# Patient Record
Sex: FEMALE | Race: BLACK OR AFRICAN AMERICAN | Employment: UNEMPLOYED | URBAN - METROPOLITAN AREA
[De-identification: names, ages, dates, MRNs, and addresses within clinical notes are randomized per-mention and may not be internally consistent; named-entity substitution may affect disease eponyms.]

---

## 2017-01-01 ENCOUNTER — APPOINTMENT (OUTPATIENT)
Dept: GENERAL RADIOLOGY | Age: 82
DRG: 291 | End: 2017-01-01
Attending: PHYSICIAN ASSISTANT
Payer: MEDICARE

## 2017-01-01 ENCOUNTER — HOME CARE VISIT (OUTPATIENT)
Dept: SCHEDULING | Facility: HOME HEALTH | Age: 82
End: 2017-01-01
Payer: MEDICARE

## 2017-01-01 ENCOUNTER — APPOINTMENT (OUTPATIENT)
Dept: GENERAL RADIOLOGY | Age: 82
DRG: 291 | End: 2017-01-01
Attending: EMERGENCY MEDICINE
Payer: MEDICARE

## 2017-01-01 ENCOUNTER — HOME CARE VISIT (OUTPATIENT)
Dept: HOME HEALTH SERVICES | Facility: HOME HEALTH | Age: 82
End: 2017-01-01
Payer: MEDICARE

## 2017-01-01 ENCOUNTER — DOCUMENTATION ONLY (OUTPATIENT)
Dept: PULMONOLOGY | Age: 82
End: 2017-01-01

## 2017-01-01 ENCOUNTER — HOSPITAL ENCOUNTER (OUTPATIENT)
Dept: LAB | Age: 82
Discharge: HOME | End: 2017-01-11
Payer: MEDICARE

## 2017-01-01 ENCOUNTER — HOSPITAL ENCOUNTER (INPATIENT)
Age: 82
LOS: 4 days | Discharge: ANOTHER HOSPITAL | DRG: 291 | End: 2017-01-29
Attending: EMERGENCY MEDICINE | Admitting: INTERNAL MEDICINE
Payer: MEDICARE

## 2017-01-01 ENCOUNTER — NURSE NAVIGATOR (OUTPATIENT)
Dept: OTHER | Age: 82
End: 2017-01-01

## 2017-01-01 ENCOUNTER — PATIENT OUTREACH (OUTPATIENT)
Dept: INTERNAL MEDICINE CLINIC | Age: 82
End: 2017-01-01

## 2017-01-01 ENCOUNTER — HOME CARE VISIT (OUTPATIENT)
Dept: SCHEDULING | Facility: HOME HEALTH | Age: 82
End: 2017-01-01

## 2017-01-01 ENCOUNTER — OFFICE VISIT (OUTPATIENT)
Dept: INTERNAL MEDICINE CLINIC | Age: 82
End: 2017-01-01

## 2017-01-01 ENCOUNTER — TELEPHONE (OUTPATIENT)
Dept: INTERNAL MEDICINE CLINIC | Age: 82
End: 2017-01-01

## 2017-01-01 ENCOUNTER — OFFICE VISIT (OUTPATIENT)
Dept: PULMONOLOGY | Age: 82
End: 2017-01-01

## 2017-01-01 VITALS
DIASTOLIC BLOOD PRESSURE: 60 MMHG | TEMPERATURE: 97.4 F | RESPIRATION RATE: 18 BRPM | OXYGEN SATURATION: 88 % | WEIGHT: 156 LBS | BODY MASS INDEX: 26.63 KG/M2 | HEIGHT: 64 IN | SYSTOLIC BLOOD PRESSURE: 98 MMHG | HEART RATE: 96 BPM

## 2017-01-01 VITALS
SYSTOLIC BLOOD PRESSURE: 120 MMHG | HEART RATE: 85 BPM | TEMPERATURE: 97.1 F | DIASTOLIC BLOOD PRESSURE: 60 MMHG | OXYGEN SATURATION: 91 %

## 2017-01-01 VITALS
SYSTOLIC BLOOD PRESSURE: 114 MMHG | TEMPERATURE: 97.2 F | BODY MASS INDEX: 26.8 KG/M2 | DIASTOLIC BLOOD PRESSURE: 64 MMHG | HEIGHT: 64 IN | WEIGHT: 157 LBS | HEART RATE: 88 BPM | RESPIRATION RATE: 16 BRPM | OXYGEN SATURATION: 87 %

## 2017-01-01 VITALS
HEART RATE: 90 BPM | TEMPERATURE: 98.1 F | DIASTOLIC BLOOD PRESSURE: 60 MMHG | OXYGEN SATURATION: 83 % | SYSTOLIC BLOOD PRESSURE: 120 MMHG

## 2017-01-01 VITALS
SYSTOLIC BLOOD PRESSURE: 128 MMHG | HEART RATE: 100 BPM | HEIGHT: 64 IN | RESPIRATION RATE: 16 BRPM | DIASTOLIC BLOOD PRESSURE: 80 MMHG

## 2017-01-01 VITALS — TEMPERATURE: 97.4 F | DIASTOLIC BLOOD PRESSURE: 58 MMHG | SYSTOLIC BLOOD PRESSURE: 100 MMHG

## 2017-01-01 VITALS
OXYGEN SATURATION: 83 % | WEIGHT: 155 LBS | RESPIRATION RATE: 20 BRPM | TEMPERATURE: 97.3 F | DIASTOLIC BLOOD PRESSURE: 60 MMHG | BODY MASS INDEX: 26.46 KG/M2 | HEART RATE: 102 BPM | HEIGHT: 64 IN | SYSTOLIC BLOOD PRESSURE: 96 MMHG

## 2017-01-01 VITALS
HEART RATE: 84 BPM | DIASTOLIC BLOOD PRESSURE: 56 MMHG | TEMPERATURE: 97.5 F | OXYGEN SATURATION: 84 % | SYSTOLIC BLOOD PRESSURE: 90 MMHG

## 2017-01-01 VITALS
SYSTOLIC BLOOD PRESSURE: 110 MMHG | TEMPERATURE: 98 F | DIASTOLIC BLOOD PRESSURE: 62 MMHG | HEART RATE: 82 BPM | OXYGEN SATURATION: 89 %

## 2017-01-01 VITALS
TEMPERATURE: 99 F | HEART RATE: 71 BPM | DIASTOLIC BLOOD PRESSURE: 51 MMHG | SYSTOLIC BLOOD PRESSURE: 100 MMHG | RESPIRATION RATE: 20 BRPM

## 2017-01-01 VITALS — TEMPERATURE: 97.9 F | SYSTOLIC BLOOD PRESSURE: 100 MMHG | DIASTOLIC BLOOD PRESSURE: 66 MMHG

## 2017-01-01 VITALS
OXYGEN SATURATION: 100 % | TEMPERATURE: 97.3 F | DIASTOLIC BLOOD PRESSURE: 61 MMHG | WEIGHT: 164.7 LBS | RESPIRATION RATE: 18 BRPM | BODY MASS INDEX: 28.27 KG/M2 | SYSTOLIC BLOOD PRESSURE: 91 MMHG | HEART RATE: 98 BPM

## 2017-01-01 VITALS — DIASTOLIC BLOOD PRESSURE: 60 MMHG | SYSTOLIC BLOOD PRESSURE: 92 MMHG | HEART RATE: 80 BPM

## 2017-01-01 VITALS
TEMPERATURE: 98.4 F | RESPIRATION RATE: 20 BRPM | SYSTOLIC BLOOD PRESSURE: 104 MMHG | HEART RATE: 83 BPM | OXYGEN SATURATION: 97 % | DIASTOLIC BLOOD PRESSURE: 56 MMHG

## 2017-01-01 DIAGNOSIS — I10 ESSENTIAL HYPERTENSION, BENIGN: ICD-10-CM

## 2017-01-01 DIAGNOSIS — R09.02 HYPOXIA: Primary | ICD-10-CM

## 2017-01-01 DIAGNOSIS — M79.10 MYALGIA: ICD-10-CM

## 2017-01-01 DIAGNOSIS — M79.10 MYALGIA: Primary | ICD-10-CM

## 2017-01-01 DIAGNOSIS — J84.9 ILD (INTERSTITIAL LUNG DISEASE) (HCC): Primary | ICD-10-CM

## 2017-01-01 DIAGNOSIS — I73.00 RAYNAUD'S DISEASE WITHOUT GANGRENE: ICD-10-CM

## 2017-01-01 DIAGNOSIS — I27.20 PULMONARY HTN (HCC): ICD-10-CM

## 2017-01-01 DIAGNOSIS — R60.0 BILATERAL EDEMA OF LOWER EXTREMITY: ICD-10-CM

## 2017-01-01 DIAGNOSIS — R60.0 BILATERAL LEG EDEMA: Primary | ICD-10-CM

## 2017-01-01 DIAGNOSIS — I50.21 ACUTE SYSTOLIC CONGESTIVE HEART FAILURE (HCC): Primary | ICD-10-CM

## 2017-01-01 DIAGNOSIS — R09.02 HYPOXIA: ICD-10-CM

## 2017-01-01 DIAGNOSIS — J44.9 COPD, MODERATE (HCC): Primary | ICD-10-CM

## 2017-01-01 DIAGNOSIS — R55 SYNCOPE, UNSPECIFIED SYNCOPE TYPE: ICD-10-CM

## 2017-01-01 LAB
ACE SERPL-CCNC: 19 U/L (ref 14–82)
ALBUMIN SERPL BCP-MCNC: 2.1 G/DL (ref 3.4–5)
ALBUMIN SERPL BCP-MCNC: 2.3 G/DL (ref 3.4–5)
ALBUMIN SERPL BCP-MCNC: 2.8 G/DL (ref 3.4–5)
ALBUMIN/GLOB SERPL: 0.6 {RATIO} (ref 0.8–1.7)
ALBUMIN/GLOB SERPL: 0.7 {RATIO} (ref 0.8–1.7)
ALBUMIN/GLOB SERPL: 0.8 {RATIO} (ref 0.8–1.7)
ALP SERPL-CCNC: 39 U/L (ref 45–117)
ALP SERPL-CCNC: 52 U/L (ref 45–117)
ALP SERPL-CCNC: 54 U/L (ref 45–117)
ALT SERPL-CCNC: 23 U/L (ref 13–56)
ALT SERPL-CCNC: 27 U/L (ref 13–56)
ALT SERPL-CCNC: 28 U/L (ref 13–56)
AMORPH CRY URNS QL MICRO: ABNORMAL
ANA SER QL: POSITIVE
ANION GAP BLD CALC-SCNC: 10 MMOL/L (ref 3–18)
ANION GAP BLD CALC-SCNC: 10 MMOL/L (ref 3–18)
ANION GAP BLD CALC-SCNC: 11 MMOL/L (ref 3–18)
ANION GAP BLD CALC-SCNC: 11 MMOL/L (ref 3–18)
ANION GAP BLD CALC-SCNC: 12 MMOL/L (ref 3–18)
ANION GAP BLD CALC-SCNC: 13 MMOL/L (ref 3–18)
ANION GAP BLD CALC-SCNC: 13 MMOL/L (ref 3–18)
ANION GAP BLD CALC-SCNC: 19 MMOL/L (ref 3–18)
ANTI-CENTROMERE B, RCEBT: >8 AI (ref 0–0.9)
ANTI-RIBOSOMAL P A, RRPAT: <0.2 AI (ref 0–0.9)
ANTICHROMATIN ABS, ACHRLT: 0.7 AI (ref 0–0.9)
APPEARANCE UR: ABNORMAL
APPEARANCE UR: CLEAR
ARTERIAL PATENCY WRIST A: YES
AST SERPL W P-5'-P-CCNC: 18 U/L (ref 15–37)
AST SERPL W P-5'-P-CCNC: 19 U/L (ref 15–37)
AST SERPL W P-5'-P-CCNC: 36 U/L (ref 15–37)
ATRIAL RATE: 102 BPM
ATRIAL RATE: 92 BPM
ATRIAL RATE: 98 BPM
BACTERIA SPEC CULT: NORMAL
BACTERIA URNS QL MICRO: ABNORMAL /HPF
BASE DEFICIT BLD-SCNC: 3 MMOL/L
BASE DEFICIT BLD-SCNC: 7 MMOL/L
BASE DEFICIT BLD-SCNC: 9 MMOL/L
BASOPHILS # BLD AUTO: 0 K/UL (ref 0–0.06)
BASOPHILS # BLD AUTO: 0 K/UL (ref 0–0.1)
BASOPHILS # BLD: 0 % (ref 0–2)
BASOPHILS # BLD: 0 % (ref 0–2)
BASOPHILS # BLD: 0 % (ref 0–3)
BDY SITE: ABNORMAL
BILIRUB SERPL-MCNC: 0.5 MG/DL (ref 0.2–1)
BILIRUB SERPL-MCNC: 0.5 MG/DL (ref 0.2–1)
BILIRUB SERPL-MCNC: 1.3 MG/DL (ref 0.2–1)
BILIRUB UR QL: ABNORMAL
BILIRUB UR QL: NEGATIVE
BNP SERPL-MCNC: ABNORMAL PG/ML (ref 0–1800)
BNP SERPL-MCNC: ABNORMAL PG/ML (ref 0–1800)
BUN SERPL-MCNC: 39 MG/DL (ref 7–18)
BUN SERPL-MCNC: 39 MG/DL (ref 7–18)
BUN SERPL-MCNC: 41 MG/DL (ref 7–18)
BUN SERPL-MCNC: 42 MG/DL (ref 7–18)
BUN SERPL-MCNC: 43 MG/DL (ref 7–18)
BUN SERPL-MCNC: 47 MG/DL (ref 7–18)
BUN SERPL-MCNC: 50 MG/DL (ref 7–18)
BUN SERPL-MCNC: 51 MG/DL (ref 7–18)
BUN/CREAT SERPL: 22 (ref 12–20)
BUN/CREAT SERPL: 24 (ref 12–20)
BUN/CREAT SERPL: 24 (ref 12–20)
BUN/CREAT SERPL: 29 (ref 12–20)
BUN/CREAT SERPL: 30 (ref 12–20)
BUN/CREAT SERPL: 35 (ref 12–20)
C-ANCA TITR SER IF: NORMAL TITER
CALCIUM SERPL-MCNC: 7.7 MG/DL (ref 8.5–10.1)
CALCIUM SERPL-MCNC: 8 MG/DL (ref 8.5–10.1)
CALCIUM SERPL-MCNC: 8.1 MG/DL (ref 8.5–10.1)
CALCIUM SERPL-MCNC: 8.1 MG/DL (ref 8.5–10.1)
CALCIUM SERPL-MCNC: 8.2 MG/DL (ref 8.5–10.1)
CALCIUM SERPL-MCNC: 8.2 MG/DL (ref 8.5–10.1)
CALCIUM SERPL-MCNC: 8.3 MG/DL (ref 8.5–10.1)
CALCIUM SERPL-MCNC: 8.9 MG/DL (ref 8.5–10.1)
CALCULATED P AXIS, ECG09: 28 DEGREES
CALCULATED P AXIS, ECG09: 78 DEGREES
CALCULATED P AXIS, ECG09: 82 DEGREES
CALCULATED R AXIS, ECG10: 109 DEGREES
CALCULATED R AXIS, ECG10: 110 DEGREES
CALCULATED R AXIS, ECG10: 115 DEGREES
CALCULATED T AXIS, ECG11: 23 DEGREES
CALCULATED T AXIS, ECG11: 27 DEGREES
CALCULATED T AXIS, ECG11: 72 DEGREES
CHLORIDE SERPL-SCNC: 93 MMOL/L (ref 100–108)
CHLORIDE SERPL-SCNC: 93 MMOL/L (ref 100–108)
CHLORIDE SERPL-SCNC: 94 MMOL/L (ref 100–108)
CHLORIDE SERPL-SCNC: 95 MMOL/L (ref 100–108)
CHLORIDE SERPL-SCNC: 96 MMOL/L (ref 100–108)
CHOLEST SERPL-MCNC: 133 MG/DL
CK MB CFR SERPL CALC: 13.5 % (ref 0–4)
CK MB CFR SERPL CALC: 13.7 % (ref 0–4)
CK MB SERPL-MCNC: 2.6 NG/ML (ref 0.5–3.6)
CK MB SERPL-MCNC: 5.4 NG/ML (ref 0.5–3.6)
CK SERPL-CCNC: 19 U/L (ref 26–192)
CK SERPL-CCNC: 23 U/L (ref 26–192)
CK SERPL-CCNC: 40 U/L (ref 26–192)
CO2 SERPL-SCNC: 22 MMOL/L (ref 21–32)
CO2 SERPL-SCNC: 26 MMOL/L (ref 21–32)
CO2 SERPL-SCNC: 28 MMOL/L (ref 21–32)
CO2 SERPL-SCNC: 28 MMOL/L (ref 21–32)
CO2 SERPL-SCNC: 29 MMOL/L (ref 21–32)
CO2 SERPL-SCNC: 29 MMOL/L (ref 21–32)
COLOR UR: ABNORMAL
COLOR UR: YELLOW
CREAT SERPL-MCNC: 1.12 MG/DL (ref 0.6–1.3)
CREAT SERPL-MCNC: 1.12 MG/DL (ref 0.6–1.3)
CREAT SERPL-MCNC: 1.2 MG/DL (ref 0.6–1.3)
CREAT SERPL-MCNC: 1.35 MG/DL (ref 0.6–1.3)
CREAT SERPL-MCNC: 1.47 MG/DL (ref 0.6–1.3)
CREAT SERPL-MCNC: 1.96 MG/DL (ref 0.6–1.3)
CREAT SERPL-MCNC: 2.09 MG/DL (ref 0.6–1.3)
CREAT SERPL-MCNC: 2.23 MG/DL (ref 0.6–1.3)
CRP SERPL-MCNC: 13.3 MG/DL (ref 0–0.3)
DIAGNOSIS, 93000: NORMAL
DIFFERENTIAL METHOD BLD: ABNORMAL
DSDNA AB SER-ACNC: <1 IU/ML (ref 0–9)
ENA SCL70 AB SER-ACNC: <0.2 AI (ref 0–0.9)
ENA SM AB SER-ACNC: <0.2 AI (ref 0–0.9)
ENA SM+RNP AB SER-ACNC: <0.2 AI (ref 0–0.9)
ENA SS-A AB SER-ACNC: <0.2 AI (ref 0–0.9)
ENA SS-A AB SER-ACNC: <0.2 AI (ref 0–0.9)
ENA SS-B AB SER-ACNC: <0.2 AI (ref 0–0.9)
ENA SS-B AB SER-ACNC: <0.2 AI (ref 0–0.9)
EOSINOPHIL # BLD: 0 K/UL (ref 0–0.4)
EOSINOPHIL # BLD: 0.1 K/UL (ref 0–0.4)
EOSINOPHIL # BLD: 0.2 K/UL (ref 0–0.4)
EOSINOPHIL NFR BLD: 0 % (ref 0–5)
EOSINOPHIL NFR BLD: 1 % (ref 0–5)
EOSINOPHIL NFR BLD: 1 % (ref 0–5)
EPITH CASTS URNS QL MICRO: ABNORMAL /LPF (ref 0–5)
ERYTHROCYTE [DISTWIDTH] IN BLOOD BY AUTOMATED COUNT: 17.9 % (ref 11.6–14.5)
ERYTHROCYTE [DISTWIDTH] IN BLOOD BY AUTOMATED COUNT: 18.1 % (ref 11.6–14.5)
ERYTHROCYTE [DISTWIDTH] IN BLOOD BY AUTOMATED COUNT: 18.1 % (ref 11.6–14.5)
ERYTHROCYTE [DISTWIDTH] IN BLOOD BY AUTOMATED COUNT: 18.2 % (ref 11.6–14.5)
ERYTHROCYTE [DISTWIDTH] IN BLOOD BY AUTOMATED COUNT: 18.3 % (ref 11.6–14.5)
ERYTHROCYTE [DISTWIDTH] IN BLOOD BY AUTOMATED COUNT: 18.5 % (ref 11.6–14.5)
ERYTHROCYTE [DISTWIDTH] IN BLOOD BY AUTOMATED COUNT: 18.5 % (ref 11.6–14.5)
ERYTHROCYTE [SEDIMENTATION RATE] IN BLOOD: 25 MM/HR (ref 0–30)
GAS FLOW.O2 O2 DELIVERY SYS: ABNORMAL L/MIN
GAS FLOW.O2 SETTING OXYMISER: 3 L/M
GAS FLOW.O2 SETTING OXYMISER: 7 L/M
GLOBULIN SER CALC-MCNC: 3.2 G/DL (ref 2–4)
GLOBULIN SER CALC-MCNC: 3.5 G/DL (ref 2–4)
GLOBULIN SER CALC-MCNC: 3.6 G/DL (ref 2–4)
GLUCOSE BLD STRIP.AUTO-MCNC: 101 MG/DL (ref 70–110)
GLUCOSE BLD STRIP.AUTO-MCNC: 110 MG/DL (ref 70–110)
GLUCOSE BLD STRIP.AUTO-MCNC: 120 MG/DL (ref 70–110)
GLUCOSE BLD STRIP.AUTO-MCNC: 150 MG/DL (ref 70–110)
GLUCOSE BLD STRIP.AUTO-MCNC: 150 MG/DL (ref 70–110)
GLUCOSE BLD STRIP.AUTO-MCNC: 163 MG/DL (ref 70–110)
GLUCOSE BLD STRIP.AUTO-MCNC: 176 MG/DL (ref 70–110)
GLUCOSE BLD STRIP.AUTO-MCNC: 59 MG/DL (ref 70–110)
GLUCOSE SERPL-MCNC: 109 MG/DL (ref 74–99)
GLUCOSE SERPL-MCNC: 109 MG/DL (ref 74–99)
GLUCOSE SERPL-MCNC: 110 MG/DL (ref 74–99)
GLUCOSE SERPL-MCNC: 112 MG/DL (ref 74–99)
GLUCOSE SERPL-MCNC: 114 MG/DL (ref 74–99)
GLUCOSE SERPL-MCNC: 145 MG/DL (ref 74–99)
GLUCOSE SERPL-MCNC: 67 MG/DL (ref 74–99)
GLUCOSE SERPL-MCNC: 90 MG/DL (ref 74–99)
GLUCOSE UR STRIP.AUTO-MCNC: NEGATIVE MG/DL
GLUCOSE UR STRIP.AUTO-MCNC: NEGATIVE MG/DL
GRAN CASTS URNS QL MICRO: ABNORMAL /LPF
HCO3 BLD-SCNC: 17.6 MMOL/L (ref 22–26)
HCO3 BLD-SCNC: 19.7 MMOL/L (ref 22–26)
HCO3 BLD-SCNC: 22.4 MMOL/L (ref 22–26)
HCT VFR BLD AUTO: 34.5 % (ref 35–45)
HCT VFR BLD AUTO: 36.8 % (ref 35–45)
HCT VFR BLD AUTO: 37.2 % (ref 35–45)
HCT VFR BLD AUTO: 37.9 % (ref 35–45)
HCT VFR BLD AUTO: 40 % (ref 35–45)
HCT VFR BLD AUTO: 40.9 % (ref 35–45)
HCT VFR BLD AUTO: 43.5 % (ref 35–45)
HDLC SERPL-MCNC: 34 MG/DL (ref 40–60)
HDLC SERPL: 3.9 {RATIO} (ref 0–5)
HGB BLD-MCNC: 11.5 G/DL (ref 12–16)
HGB BLD-MCNC: 12.2 G/DL (ref 12–16)
HGB BLD-MCNC: 12.3 G/DL (ref 12–16)
HGB BLD-MCNC: 12.7 G/DL (ref 12–16)
HGB BLD-MCNC: 13.3 G/DL (ref 12–16)
HGB BLD-MCNC: 13.4 G/DL (ref 12–16)
HGB BLD-MCNC: 14.1 G/DL (ref 12–16)
HGB UR QL STRIP: ABNORMAL
HGB UR QL STRIP: NEGATIVE
HIV 1+2 AB+HIV1 P24 AG SERPL QL IA: NON REACTIVE
JO-1 AB, RJO1T: <0.2 AI (ref 0–0.9)
KETONES UR QL STRIP.AUTO: NEGATIVE MG/DL
KETONES UR QL STRIP.AUTO: NEGATIVE MG/DL
LACTATE SERPL-SCNC: 3.7 MMOL/L (ref 0.4–2)
LACTATE SERPL-SCNC: 4.5 MMOL/L (ref 0.4–2)
LACTATE SERPL-SCNC: 4.7 MMOL/L (ref 0.4–2)
LACTATE SERPL-SCNC: 7.9 MMOL/L (ref 0.4–2)
LDLC SERPL CALC-MCNC: 83.8 MG/DL (ref 0–100)
LEUKOCYTE ESTERASE UR QL STRIP.AUTO: ABNORMAL
LEUKOCYTE ESTERASE UR QL STRIP.AUTO: NEGATIVE
LIPID PROFILE,FLP: ABNORMAL
LYMPHOCYTES # BLD AUTO: 3 % (ref 20–51)
LYMPHOCYTES # BLD AUTO: 4 % (ref 20–51)
LYMPHOCYTES # BLD AUTO: 5 % (ref 20–51)
LYMPHOCYTES # BLD AUTO: 6 % (ref 20–51)
LYMPHOCYTES # BLD AUTO: 6 % (ref 21–52)
LYMPHOCYTES # BLD AUTO: 7 % (ref 20–51)
LYMPHOCYTES # BLD AUTO: 8 % (ref 21–52)
LYMPHOCYTES # BLD: 0.6 K/UL (ref 0.8–3.5)
LYMPHOCYTES # BLD: 0.9 K/UL (ref 0.8–3.5)
LYMPHOCYTES # BLD: 0.9 K/UL (ref 0.8–3.5)
LYMPHOCYTES # BLD: 0.9 K/UL (ref 0.9–3.6)
LYMPHOCYTES # BLD: 1 K/UL (ref 0.8–3.5)
LYMPHOCYTES # BLD: 1.1 K/UL (ref 0.8–3.5)
LYMPHOCYTES # BLD: 1.4 K/UL (ref 0.9–3.6)
MAGNESIUM SERPL-MCNC: 1.3 MG/DL (ref 1.8–2.4)
MAGNESIUM SERPL-MCNC: 1.6 MG/DL (ref 1.8–2.4)
MAGNESIUM SERPL-MCNC: 1.8 MG/DL (ref 1.8–2.4)
MCH RBC QN AUTO: 27.6 PG (ref 24–34)
MCH RBC QN AUTO: 27.8 PG (ref 24–34)
MCH RBC QN AUTO: 27.8 PG (ref 24–34)
MCH RBC QN AUTO: 27.9 PG (ref 24–34)
MCH RBC QN AUTO: 28.1 PG (ref 24–34)
MCHC RBC AUTO-ENTMCNC: 32.4 G/DL (ref 31–37)
MCHC RBC AUTO-ENTMCNC: 32.5 G/DL (ref 31–37)
MCHC RBC AUTO-ENTMCNC: 33.1 G/DL (ref 31–37)
MCHC RBC AUTO-ENTMCNC: 33.2 G/DL (ref 31–37)
MCHC RBC AUTO-ENTMCNC: 33.3 G/DL (ref 31–37)
MCHC RBC AUTO-ENTMCNC: 33.5 G/DL (ref 31–37)
MCHC RBC AUTO-ENTMCNC: 33.5 G/DL (ref 31–37)
MCV RBC AUTO: 82.7 FL (ref 74–97)
MCV RBC AUTO: 83.8 FL (ref 74–97)
MCV RBC AUTO: 83.8 FL (ref 74–97)
MCV RBC AUTO: 83.9 FL (ref 74–97)
MCV RBC AUTO: 84 FL (ref 74–97)
MCV RBC AUTO: 85.7 FL (ref 74–97)
MCV RBC AUTO: 86.7 FL (ref 74–97)
MONOCYTES # BLD: 0.6 K/UL (ref 0–1)
MONOCYTES # BLD: 0.9 K/UL (ref 0–1)
MONOCYTES # BLD: 1.2 K/UL (ref 0.05–1.2)
MONOCYTES # BLD: 1.4 K/UL (ref 0–1)
MONOCYTES # BLD: 1.5 K/UL (ref 0–1)
MONOCYTES # BLD: 1.6 K/UL (ref 0–1)
MONOCYTES # BLD: 1.8 K/UL (ref 0.05–1.2)
MONOCYTES NFR BLD AUTO: 10 % (ref 3–10)
MONOCYTES NFR BLD AUTO: 4 % (ref 2–9)
MONOCYTES NFR BLD AUTO: 6 % (ref 2–9)
MONOCYTES NFR BLD AUTO: 7 % (ref 2–9)
MONOCYTES NFR BLD AUTO: 7 % (ref 2–9)
MONOCYTES NFR BLD AUTO: 8 % (ref 2–9)
MONOCYTES NFR BLD AUTO: 8 % (ref 3–10)
MYELOPEROXIDASE AB SER IA-ACNC: <9 U/ML (ref 0–9)
NEUTS BAND NFR BLD MANUAL: 1 % (ref 0–5)
NEUTS BAND NFR BLD MANUAL: 1 % (ref 0–5)
NEUTS BAND NFR BLD MANUAL: 2 % (ref 0–5)
NEUTS SEG # BLD: 13.3 K/UL (ref 1.8–8)
NEUTS SEG # BLD: 14.1 K/UL (ref 1.8–8)
NEUTS SEG # BLD: 14.3 K/UL (ref 1.8–8)
NEUTS SEG # BLD: 14.4 K/UL (ref 1.8–8)
NEUTS SEG # BLD: 17.3 K/UL (ref 1.8–8)
NEUTS SEG # BLD: 18.2 K/UL (ref 1.8–8)
NEUTS SEG # BLD: 18.5 K/UL (ref 1.8–8)
NEUTS SEG NFR BLD AUTO: 81 % (ref 40–73)
NEUTS SEG NFR BLD AUTO: 86 % (ref 40–73)
NEUTS SEG NFR BLD AUTO: 87 % (ref 42–75)
NEUTS SEG NFR BLD AUTO: 90 % (ref 42–75)
NITRITE UR QL STRIP.AUTO: NEGATIVE
NITRITE UR QL STRIP.AUTO: NEGATIVE
O2/TOTAL GAS SETTING VFR VENT: 0.48 %
O2/TOTAL GAS SETTING VFR VENT: 0.6 %
O2/TOTAL GAS SETTING VFR VENT: 32 %
P-ANCA ATYPICAL TITR SER IF: NORMAL TITER
P-ANCA TITR SER IF: NORMAL TITER
P-R INTERVAL, ECG05: 158 MS
P-R INTERVAL, ECG05: 162 MS
P-R INTERVAL, ECG05: 164 MS
PCO2 BLD: 35.6 MMHG (ref 35–45)
PCO2 BLD: 38.8 MMHG (ref 35–45)
PCO2 BLD: 39.3 MMHG (ref 35–45)
PEEP RESPIRATORY: 5 CMH2O
PH BLD: 7.3 [PH] (ref 7.35–7.45)
PH BLD: 7.31 [PH] (ref 7.35–7.45)
PH BLD: 7.37 [PH] (ref 7.35–7.45)
PH UR STRIP: 5 [PH] (ref 5–8)
PH UR STRIP: 6.5 [PH] (ref 5–8)
PIP ISTAT,IPIP: 10
PLATELET # BLD AUTO: 136 K/UL (ref 135–420)
PLATELET # BLD AUTO: 146 K/UL (ref 135–420)
PLATELET # BLD AUTO: 156 K/UL (ref 135–420)
PLATELET # BLD AUTO: 158 K/UL (ref 135–420)
PLATELET # BLD AUTO: 163 K/UL (ref 135–420)
PLATELET # BLD AUTO: 172 K/UL (ref 135–420)
PLATELET # BLD AUTO: 197 K/UL (ref 135–420)
PLATELET COMMENTS,PCOM: ABNORMAL
PMV BLD AUTO: 10 FL (ref 9.2–11.8)
PMV BLD AUTO: 9.2 FL (ref 9.2–11.8)
PMV BLD AUTO: 9.5 FL (ref 9.2–11.8)
PMV BLD AUTO: 9.6 FL (ref 9.2–11.8)
PMV BLD AUTO: 9.6 FL (ref 9.2–11.8)
PMV BLD AUTO: 9.8 FL (ref 9.2–11.8)
PMV BLD AUTO: 9.9 FL (ref 9.2–11.8)
PO2 BLD: 56 MMHG (ref 80–100)
PO2 BLD: 58 MMHG (ref 80–100)
PO2 BLD: 73 MMHG (ref 80–100)
POTASSIUM SERPL-SCNC: 2.8 MMOL/L (ref 3.5–5.5)
POTASSIUM SERPL-SCNC: 3.3 MMOL/L (ref 3.5–5.5)
POTASSIUM SERPL-SCNC: 4.2 MMOL/L (ref 3.5–5.5)
POTASSIUM SERPL-SCNC: 4.4 MMOL/L (ref 3.5–5.5)
POTASSIUM SERPL-SCNC: 5.2 MMOL/L (ref 3.5–5.5)
POTASSIUM SERPL-SCNC: 5.8 MMOL/L (ref 3.5–5.5)
POTASSIUM SERPL-SCNC: 6 MMOL/L (ref 3.5–5.5)
POTASSIUM SERPL-SCNC: 6 MMOL/L (ref 3.5–5.5)
PROT SERPL-MCNC: 5.5 G/DL (ref 6.4–8.2)
PROT SERPL-MCNC: 5.7 G/DL (ref 6.4–8.2)
PROT SERPL-MCNC: 6.3 G/DL (ref 6.4–8.2)
PROT UR STRIP-MCNC: 100 MG/DL
PROT UR STRIP-MCNC: NEGATIVE MG/DL
PROTEINASE3 AB SER IA-ACNC: <3.5 U/ML (ref 0–3.5)
Q-T INTERVAL, ECG07: 336 MS
Q-T INTERVAL, ECG07: 338 MS
Q-T INTERVAL, ECG07: 342 MS
QRS DURATION, ECG06: 70 MS
QRS DURATION, ECG06: 82 MS
QRS DURATION, ECG06: 82 MS
QTC CALCULATION (BEZET), ECG08: 417 MS
QTC CALCULATION (BEZET), ECG08: 436 MS
QTC CALCULATION (BEZET), ECG08: 437 MS
RBC # BLD AUTO: 4.17 M/UL (ref 4.2–5.3)
RBC # BLD AUTO: 4.39 M/UL (ref 4.2–5.3)
RBC # BLD AUTO: 4.43 M/UL (ref 4.2–5.3)
RBC # BLD AUTO: 4.52 M/UL (ref 4.2–5.3)
RBC # BLD AUTO: 4.77 M/UL (ref 4.2–5.3)
RBC # BLD AUTO: 4.77 M/UL (ref 4.2–5.3)
RBC # BLD AUTO: 5.02 M/UL (ref 4.2–5.3)
RBC #/AREA URNS HPF: ABNORMAL /HPF (ref 0–5)
RBC MORPH BLD: ABNORMAL
RHEUMATOID FACT SER QL LA: POSITIVE
RHEUMATOID FACT TITR SER LA: ABNORMAL {TITER}
RNP ABS, RNPRLT: <0.2 AI (ref 0–0.9)
SAO2 % BLD: 87 % (ref 92–97)
SAO2 % BLD: 88 % (ref 92–97)
SAO2 % BLD: 93 % (ref 92–97)
SCLERODERMA AB, RSCLT: <0.2 AI (ref 0–0.9)
SEE BELOW:, 164879: ABNORMAL
SERVICE CMNT-IMP: ABNORMAL
SERVICE CMNT-IMP: NORMAL
SODIUM SERPL-SCNC: 132 MMOL/L (ref 136–145)
SODIUM SERPL-SCNC: 132 MMOL/L (ref 136–145)
SODIUM SERPL-SCNC: 133 MMOL/L (ref 136–145)
SODIUM SERPL-SCNC: 133 MMOL/L (ref 136–145)
SODIUM SERPL-SCNC: 134 MMOL/L (ref 136–145)
SODIUM SERPL-SCNC: 134 MMOL/L (ref 136–145)
SODIUM SERPL-SCNC: 135 MMOL/L (ref 136–145)
SODIUM SERPL-SCNC: 136 MMOL/L (ref 136–145)
SP GR UR REFRACTOMETRY: 1.01 (ref 1–1.03)
SP GR UR REFRACTOMETRY: 1.02 (ref 1–1.03)
SPECIMEN TYPE: ABNORMAL
TOTAL RESP. RATE, ITRR: 18
TOTAL RESP. RATE, ITRR: 18
TOTAL RESP. RATE, ITRR: 20
TRIGL SERPL-MCNC: 76 MG/DL (ref ?–150)
TROPONIN I SERPL-MCNC: 0.03 NG/ML (ref 0–0.04)
TROPONIN I SERPL-MCNC: 0.09 NG/ML (ref 0–0.04)
UROBILINOGEN UR QL STRIP.AUTO: 1 EU/DL (ref 0.2–1)
UROBILINOGEN UR QL STRIP.AUTO: 1 EU/DL (ref 0.2–1)
VENTRICULAR RATE, ECG03: 102 BPM
VENTRICULAR RATE, ECG03: 92 BPM
VENTRICULAR RATE, ECG03: 98 BPM
VLDLC SERPL CALC-MCNC: 15.2 MG/DL
WBC # BLD AUTO: 15.3 K/UL (ref 4.6–13.2)
WBC # BLD AUTO: 15.8 K/UL (ref 4.6–13.2)
WBC # BLD AUTO: 16.2 K/UL (ref 4.6–13.2)
WBC # BLD AUTO: 17.7 K/UL (ref 4.6–13.2)
WBC # BLD AUTO: 19.7 K/UL (ref 4.6–13.2)
WBC # BLD AUTO: 20.6 K/UL (ref 4.6–13.2)
WBC # BLD AUTO: 21.3 K/UL (ref 4.6–13.2)
WBC MORPH BLD: ABNORMAL
WBC URNS QL MICRO: ABNORMAL /HPF (ref 0–4)

## 2017-01-01 PROCEDURE — 85025 COMPLETE CBC W/AUTO DIFF WBC: CPT | Performed by: INTERNAL MEDICINE

## 2017-01-01 PROCEDURE — 86431 RHEUMATOID FACTOR QUANT: CPT | Performed by: INTERNAL MEDICINE

## 2017-01-01 PROCEDURE — 93971 EXTREMITY STUDY: CPT

## 2017-01-01 PROCEDURE — 86235 NUCLEAR ANTIGEN ANTIBODY: CPT | Performed by: INTERNAL MEDICINE

## 2017-01-01 PROCEDURE — 96365 THER/PROPH/DIAG IV INF INIT: CPT

## 2017-01-01 PROCEDURE — 80048 BASIC METABOLIC PNL TOTAL CA: CPT | Performed by: INTERNAL MEDICINE

## 2017-01-01 PROCEDURE — 36600 WITHDRAWAL OF ARTERIAL BLOOD: CPT

## 2017-01-01 PROCEDURE — 94660 CPAP INITIATION&MGMT: CPT

## 2017-01-01 PROCEDURE — 81003 URINALYSIS AUTO W/O SCOPE: CPT | Performed by: EMERGENCY MEDICINE

## 2017-01-01 PROCEDURE — 82550 ASSAY OF CK (CPK): CPT | Performed by: EMERGENCY MEDICINE

## 2017-01-01 PROCEDURE — 82164 ANGIOTENSIN I ENZYME TEST: CPT | Performed by: INTERNAL MEDICINE

## 2017-01-01 PROCEDURE — 74011250637 HC RX REV CODE- 250/637: Performed by: INTERNAL MEDICINE

## 2017-01-01 PROCEDURE — 74011250636 HC RX REV CODE- 250/636: Performed by: INTERNAL MEDICINE

## 2017-01-01 PROCEDURE — 82962 GLUCOSE BLOOD TEST: CPT

## 2017-01-01 PROCEDURE — 81001 URINALYSIS AUTO W/SCOPE: CPT | Performed by: HOSPITALIST

## 2017-01-01 PROCEDURE — 94640 AIRWAY INHALATION TREATMENT: CPT

## 2017-01-01 PROCEDURE — 83605 ASSAY OF LACTIC ACID: CPT | Performed by: FAMILY MEDICINE

## 2017-01-01 PROCEDURE — 82550 ASSAY OF CK (CPK): CPT

## 2017-01-01 PROCEDURE — 86140 C-REACTIVE PROTEIN: CPT | Performed by: INTERNAL MEDICINE

## 2017-01-01 PROCEDURE — 71010 XR CHEST PORT: CPT

## 2017-01-01 PROCEDURE — 36415 COLL VENOUS BLD VENIPUNCTURE: CPT | Performed by: INTERNAL MEDICINE

## 2017-01-01 PROCEDURE — 65660000000 HC RM CCU STEPDOWN

## 2017-01-01 PROCEDURE — G0299 HHS/HOSPICE OF RN EA 15 MIN: HCPCS

## 2017-01-01 PROCEDURE — 97166 OT EVAL MOD COMPLEX 45 MIN: CPT

## 2017-01-01 PROCEDURE — G0156 HHCP-SVS OF AIDE,EA 15 MIN: HCPCS

## 2017-01-01 PROCEDURE — 80053 COMPREHEN METABOLIC PANEL: CPT | Performed by: INTERNAL MEDICINE

## 2017-01-01 PROCEDURE — 80053 COMPREHEN METABOLIC PANEL: CPT | Performed by: EMERGENCY MEDICINE

## 2017-01-01 PROCEDURE — 86225 DNA ANTIBODY NATIVE: CPT | Performed by: INTERNAL MEDICINE

## 2017-01-01 PROCEDURE — 87086 URINE CULTURE/COLONY COUNT: CPT | Performed by: FAMILY MEDICINE

## 2017-01-01 PROCEDURE — 74011250637 HC RX REV CODE- 250/637: Performed by: NURSE PRACTITIONER

## 2017-01-01 PROCEDURE — 74011250637 HC RX REV CODE- 250/637: Performed by: EMERGENCY MEDICINE

## 2017-01-01 PROCEDURE — 82803 BLOOD GASES ANY COMBINATION: CPT

## 2017-01-01 PROCEDURE — 74011636637 HC RX REV CODE- 636/637: Performed by: INTERNAL MEDICINE

## 2017-01-01 PROCEDURE — 80053 COMPREHEN METABOLIC PANEL: CPT

## 2017-01-01 PROCEDURE — 97530 THERAPEUTIC ACTIVITIES: CPT

## 2017-01-01 PROCEDURE — 87040 BLOOD CULTURE FOR BACTERIA: CPT | Performed by: FAMILY MEDICINE

## 2017-01-01 PROCEDURE — 85025 COMPLETE CBC W/AUTO DIFF WBC: CPT | Performed by: EMERGENCY MEDICINE

## 2017-01-01 PROCEDURE — G0157 HHC PT ASSISTANT EA 15: HCPCS

## 2017-01-01 PROCEDURE — 74011250636 HC RX REV CODE- 250/636: Performed by: HOSPITALIST

## 2017-01-01 PROCEDURE — 93005 ELECTROCARDIOGRAM TRACING: CPT

## 2017-01-01 PROCEDURE — 83605 ASSAY OF LACTIC ACID: CPT | Performed by: INTERNAL MEDICINE

## 2017-01-01 PROCEDURE — 87086 URINE CULTURE/COLONY COUNT: CPT | Performed by: HOSPITALIST

## 2017-01-01 PROCEDURE — 87086 URINE CULTURE/COLONY COUNT: CPT | Performed by: INTERNAL MEDICINE

## 2017-01-01 PROCEDURE — 87040 BLOOD CULTURE FOR BACTERIA: CPT | Performed by: EMERGENCY MEDICINE

## 2017-01-01 PROCEDURE — 74011000250 HC RX REV CODE- 250: Performed by: INTERNAL MEDICINE

## 2017-01-01 PROCEDURE — 74011250636 HC RX REV CODE- 250/636: Performed by: FAMILY MEDICINE

## 2017-01-01 PROCEDURE — 80061 LIPID PANEL: CPT | Performed by: INTERNAL MEDICINE

## 2017-01-01 PROCEDURE — 74011000250 HC RX REV CODE- 250: Performed by: FAMILY MEDICINE

## 2017-01-01 PROCEDURE — 83735 ASSAY OF MAGNESIUM: CPT | Performed by: INTERNAL MEDICINE

## 2017-01-01 PROCEDURE — 77010033678 HC OXYGEN DAILY

## 2017-01-01 PROCEDURE — 74011000258 HC RX REV CODE- 258: Performed by: INTERNAL MEDICINE

## 2017-01-01 PROCEDURE — 83735 ASSAY OF MAGNESIUM: CPT | Performed by: EMERGENCY MEDICINE

## 2017-01-01 PROCEDURE — 82550 ASSAY OF CK (CPK): CPT | Performed by: INTERNAL MEDICINE

## 2017-01-01 PROCEDURE — G0151 HHCP-SERV OF PT,EA 15 MIN: HCPCS

## 2017-01-01 PROCEDURE — 86038 ANTINUCLEAR ANTIBODIES: CPT | Performed by: INTERNAL MEDICINE

## 2017-01-01 PROCEDURE — 87389 HIV-1 AG W/HIV-1&-2 AB AG IA: CPT | Performed by: INTERNAL MEDICINE

## 2017-01-01 PROCEDURE — 86430 RHEUMATOID FACTOR TEST QUAL: CPT | Performed by: INTERNAL MEDICINE

## 2017-01-01 PROCEDURE — 96375 TX/PRO/DX INJ NEW DRUG ADDON: CPT

## 2017-01-01 PROCEDURE — 74011250636 HC RX REV CODE- 250/636: Performed by: EMERGENCY MEDICINE

## 2017-01-01 PROCEDURE — 51702 INSERT TEMP BLADDER CATH: CPT

## 2017-01-01 PROCEDURE — 97162 PT EVAL MOD COMPLEX 30 MIN: CPT

## 2017-01-01 PROCEDURE — 77030005514 HC CATH URETH FOL14 BARD -A

## 2017-01-01 PROCEDURE — 99285 EMERGENCY DEPT VISIT HI MDM: CPT

## 2017-01-01 PROCEDURE — 85652 RBC SED RATE AUTOMATED: CPT | Performed by: INTERNAL MEDICINE

## 2017-01-01 PROCEDURE — 83605 ASSAY OF LACTIC ACID: CPT

## 2017-01-01 PROCEDURE — 83880 ASSAY OF NATRIURETIC PEPTIDE: CPT | Performed by: INTERNAL MEDICINE

## 2017-01-01 PROCEDURE — G0152 HHCP-SERV OF OT,EA 15 MIN: HCPCS

## 2017-01-01 PROCEDURE — 83605 ASSAY OF LACTIC ACID: CPT | Performed by: HOSPITALIST

## 2017-01-01 PROCEDURE — 83520 IMMUNOASSAY QUANT NOS NONAB: CPT | Performed by: INTERNAL MEDICINE

## 2017-01-01 PROCEDURE — 83735 ASSAY OF MAGNESIUM: CPT | Performed by: NURSE PRACTITIONER

## 2017-01-01 PROCEDURE — 83516 IMMUNOASSAY NONANTIBODY: CPT | Performed by: INTERNAL MEDICINE

## 2017-01-01 PROCEDURE — 83880 ASSAY OF NATRIURETIC PEPTIDE: CPT | Performed by: EMERGENCY MEDICINE

## 2017-01-01 PROCEDURE — 80048 BASIC METABOLIC PNL TOTAL CA: CPT | Performed by: FAMILY MEDICINE

## 2017-01-01 RX ORDER — PREDNISONE 5 MG/1
TABLET ORAL
Qty: 100 TAB | Refills: 1 | Status: SHIPPED | OUTPATIENT
Start: 2017-01-01 | End: 2017-01-01

## 2017-01-01 RX ORDER — SODIUM BICARBONATE 1 MEQ/ML
50 SYRINGE (ML) INTRAVENOUS 3 TIMES DAILY
Status: COMPLETED | OUTPATIENT
Start: 2017-01-01 | End: 2017-01-01

## 2017-01-01 RX ORDER — PREDNISONE 10 MG/1
10 TABLET ORAL
Status: DISCONTINUED | OUTPATIENT
Start: 2017-01-01 | End: 2017-01-01

## 2017-01-01 RX ORDER — SODIUM CHLORIDE 9 MG/ML
250 INJECTION, SOLUTION INTRAVENOUS ONCE
Status: COMPLETED | OUTPATIENT
Start: 2017-01-01 | End: 2017-01-01

## 2017-01-01 RX ORDER — ERGOCALCIFEROL 1.25 MG/1
50000 CAPSULE ORAL
Status: DISCONTINUED | OUTPATIENT
Start: 2017-01-01 | End: 2017-01-01 | Stop reason: HOSPADM

## 2017-01-01 RX ORDER — POTASSIUM CHLORIDE 20 MEQ/1
40 TABLET, EXTENDED RELEASE ORAL 3 TIMES DAILY
Status: COMPLETED | OUTPATIENT
Start: 2017-01-01 | End: 2017-01-01

## 2017-01-01 RX ORDER — ESTRADIOL 1 MG/1
TABLET ORAL
Qty: 90 TAB | Refills: 0 | Status: SHIPPED | OUTPATIENT
Start: 2017-01-01

## 2017-01-01 RX ORDER — ALPRAZOLAM 0.25 MG/1
0.12 TABLET ORAL
Status: DISCONTINUED | OUTPATIENT
Start: 2017-01-01 | End: 2017-01-01 | Stop reason: HOSPADM

## 2017-01-01 RX ORDER — ALPRAZOLAM 0.25 MG/1
1 TABLET ORAL
Status: COMPLETED | OUTPATIENT
Start: 2017-01-01 | End: 2017-01-01

## 2017-01-01 RX ORDER — ONDANSETRON 2 MG/ML
4 INJECTION INTRAMUSCULAR; INTRAVENOUS
Status: DISCONTINUED | OUTPATIENT
Start: 2017-01-01 | End: 2017-01-01 | Stop reason: HOSPADM

## 2017-01-01 RX ORDER — DEXTROSE 50 % IN WATER (D50W) INTRAVENOUS SYRINGE
Status: DISCONTINUED
Start: 2017-01-01 | End: 2017-01-01 | Stop reason: HOSPADM

## 2017-01-01 RX ORDER — POTASSIUM CHLORIDE 20 MEQ/1
40 TABLET, EXTENDED RELEASE ORAL 2 TIMES DAILY
Status: COMPLETED | OUTPATIENT
Start: 2017-01-01 | End: 2017-01-01

## 2017-01-01 RX ORDER — LEVOFLOXACIN 5 MG/ML
250 INJECTION, SOLUTION INTRAVENOUS
Status: DISCONTINUED | OUTPATIENT
Start: 2017-01-01 | End: 2017-01-01

## 2017-01-01 RX ORDER — ALBUTEROL SULFATE 90 UG/1
1 AEROSOL, METERED RESPIRATORY (INHALATION)
Status: DISCONTINUED | OUTPATIENT
Start: 2017-01-01 | End: 2017-01-01 | Stop reason: CLARIF

## 2017-01-01 RX ORDER — POTASSIUM CHLORIDE 20 MEQ/1
40 TABLET, EXTENDED RELEASE ORAL
Status: COMPLETED | OUTPATIENT
Start: 2017-01-01 | End: 2017-01-01

## 2017-01-01 RX ORDER — ENOXAPARIN SODIUM 100 MG/ML
40 INJECTION SUBCUTANEOUS EVERY 24 HOURS
Status: DISCONTINUED | OUTPATIENT
Start: 2017-01-01 | End: 2017-01-01

## 2017-01-01 RX ORDER — DEXTROSE 50 % IN WATER (D50W) INTRAVENOUS SYRINGE
50
Status: COMPLETED | OUTPATIENT
Start: 2017-01-01 | End: 2017-01-01

## 2017-01-01 RX ORDER — ACETAMINOPHEN 325 MG/1
650 TABLET ORAL
Status: DISCONTINUED | OUTPATIENT
Start: 2017-01-01 | End: 2017-01-01 | Stop reason: HOSPADM

## 2017-01-01 RX ORDER — SODIUM CHLORIDE 9 MG/ML
500 INJECTION, SOLUTION INTRAVENOUS ONCE
Status: COMPLETED | OUTPATIENT
Start: 2017-01-01 | End: 2017-01-01

## 2017-01-01 RX ORDER — ENOXAPARIN SODIUM 100 MG/ML
30 INJECTION SUBCUTANEOUS EVERY 24 HOURS
Status: DISCONTINUED | OUTPATIENT
Start: 2017-01-01 | End: 2017-01-01 | Stop reason: HOSPADM

## 2017-01-01 RX ORDER — DOXEPIN HYDROCHLORIDE 10 MG/1
10 CAPSULE ORAL EVERY EVENING
Status: DISCONTINUED | OUTPATIENT
Start: 2017-01-01 | End: 2017-01-01 | Stop reason: HOSPADM

## 2017-01-01 RX ORDER — ALBUTEROL SULFATE 0.83 MG/ML
2.5 SOLUTION RESPIRATORY (INHALATION)
Status: DISCONTINUED | OUTPATIENT
Start: 2017-01-01 | End: 2017-01-01 | Stop reason: HOSPADM

## 2017-01-01 RX ORDER — ESTRADIOL 1 MG/1
1 TABLET ORAL DAILY
Status: DISCONTINUED | OUTPATIENT
Start: 2017-01-01 | End: 2017-01-01 | Stop reason: HOSPADM

## 2017-01-01 RX ORDER — NALOXONE HYDROCHLORIDE 0.4 MG/ML
0.4 INJECTION, SOLUTION INTRAMUSCULAR; INTRAVENOUS; SUBCUTANEOUS AS NEEDED
Status: DISCONTINUED | OUTPATIENT
Start: 2017-01-01 | End: 2017-01-01 | Stop reason: HOSPADM

## 2017-01-01 RX ORDER — GABAPENTIN 300 MG/1
600 CAPSULE ORAL
Status: DISCONTINUED | OUTPATIENT
Start: 2017-01-01 | End: 2017-01-01 | Stop reason: HOSPADM

## 2017-01-01 RX ORDER — FUROSEMIDE 10 MG/ML
60 INJECTION INTRAMUSCULAR; INTRAVENOUS
Status: COMPLETED | OUTPATIENT
Start: 2017-01-01 | End: 2017-01-01

## 2017-01-01 RX ORDER — SODIUM BICARBONATE 1 MEQ/ML
50 SYRINGE (ML) INTRAVENOUS ONCE
Status: COMPLETED | OUTPATIENT
Start: 2017-01-01 | End: 2017-01-01

## 2017-01-01 RX ORDER — METOLAZONE 2.5 MG/1
TABLET ORAL
Qty: 20 TAB | Refills: 1 | Status: SHIPPED | OUTPATIENT
Start: 2017-01-01 | End: 2017-01-01

## 2017-01-01 RX ORDER — PRAVASTATIN SODIUM 20 MG/1
20 TABLET ORAL
Status: DISCONTINUED | OUTPATIENT
Start: 2017-01-01 | End: 2017-01-01

## 2017-01-01 RX ORDER — LEVOFLOXACIN 5 MG/ML
250 INJECTION, SOLUTION INTRAVENOUS EVERY 24 HOURS
Status: DISCONTINUED | OUTPATIENT
Start: 2017-01-01 | End: 2017-01-01

## 2017-01-01 RX ORDER — SODIUM CHLORIDE 9 MG/ML
1000 INJECTION, SOLUTION INTRAVENOUS ONCE
Status: COMPLETED | OUTPATIENT
Start: 2017-01-01 | End: 2017-01-01

## 2017-01-01 RX ORDER — HYDROCODONE BITARTRATE AND ACETAMINOPHEN 5; 325 MG/1; MG/1
1 TABLET ORAL
Status: DISCONTINUED | OUTPATIENT
Start: 2017-01-01 | End: 2017-01-01 | Stop reason: HOSPADM

## 2017-01-01 RX ORDER — GUAIFENESIN 100 MG/5ML
81 LIQUID (ML) ORAL DAILY
Status: DISCONTINUED | OUTPATIENT
Start: 2017-01-01 | End: 2017-01-01 | Stop reason: HOSPADM

## 2017-01-01 RX ORDER — LEVOFLOXACIN 5 MG/ML
500 INJECTION, SOLUTION INTRAVENOUS
Status: COMPLETED | OUTPATIENT
Start: 2017-01-01 | End: 2017-01-01

## 2017-01-01 RX ORDER — POTASSIUM CHLORIDE 20 MEQ/1
20 TABLET, EXTENDED RELEASE ORAL DAILY
Status: DISCONTINUED | OUTPATIENT
Start: 2017-01-01 | End: 2017-01-01

## 2017-01-01 RX ORDER — MAGNESIUM SULFATE HEPTAHYDRATE 40 MG/ML
2 INJECTION, SOLUTION INTRAVENOUS ONCE
Status: COMPLETED | OUTPATIENT
Start: 2017-01-01 | End: 2017-01-01

## 2017-01-01 RX ORDER — DEXTROSE 50 % IN WATER (D50W) INTRAVENOUS SYRINGE
25 ONCE
Status: COMPLETED | OUTPATIENT
Start: 2017-01-01 | End: 2017-01-01

## 2017-01-01 RX ORDER — DOXEPIN HYDROCHLORIDE 10 MG/1
CAPSULE ORAL
Qty: 90 CAP | Refills: 3 | Status: SHIPPED | OUTPATIENT
Start: 2017-01-01

## 2017-01-01 RX ADMIN — LEVOFLOXACIN 250 MG: 5 INJECTION, SOLUTION INTRAVENOUS at 09:25

## 2017-01-01 RX ADMIN — ASPIRIN 81 MG 81 MG: 81 TABLET ORAL at 10:05

## 2017-01-01 RX ADMIN — SODIUM CHLORIDE 1000 ML: 900 INJECTION, SOLUTION INTRAVENOUS at 12:13

## 2017-01-01 RX ADMIN — ALPRAZOLAM 0.12 MG: 0.25 TABLET ORAL at 17:49

## 2017-01-01 RX ADMIN — VANCOMYCIN HYDROCHLORIDE 1500 MG: 10 INJECTION, POWDER, LYOPHILIZED, FOR SOLUTION INTRAVENOUS at 09:06

## 2017-01-01 RX ADMIN — HYDROCODONE BITARTRATE AND ACETAMINOPHEN 1 TABLET: 5; 325 TABLET ORAL at 02:36

## 2017-01-01 RX ADMIN — ALPRAZOLAM 1 MG: 0.25 TABLET ORAL at 08:43

## 2017-01-01 RX ADMIN — SODIUM CHLORIDE 250 ML: 900 INJECTION, SOLUTION INTRAVENOUS at 17:50

## 2017-01-01 RX ADMIN — POTASSIUM CHLORIDE 40 MEQ: 1500 TABLET, EXTENDED RELEASE ORAL at 10:26

## 2017-01-01 RX ADMIN — PREDNISONE 10 MG: 10 TABLET ORAL at 10:26

## 2017-01-01 RX ADMIN — HYDROCODONE BITARTRATE AND ACETAMINOPHEN 1 TABLET: 5; 325 TABLET ORAL at 09:25

## 2017-01-01 RX ADMIN — GABAPENTIN 600 MG: 300 CAPSULE ORAL at 21:21

## 2017-01-01 RX ADMIN — PREDNISONE 10 MG: 10 TABLET ORAL at 10:05

## 2017-01-01 RX ADMIN — DEXTROSE MONOHYDRATE 25 G: 25 INJECTION, SOLUTION INTRAVENOUS at 09:02

## 2017-01-01 RX ADMIN — DEXTROSE MONOHYDRATE 25 G: 25 INJECTION, SOLUTION INTRAVENOUS at 16:11

## 2017-01-01 RX ADMIN — ENOXAPARIN SODIUM 40 MG: 40 INJECTION SUBCUTANEOUS at 14:46

## 2017-01-01 RX ADMIN — FUROSEMIDE 60 MG: 10 INJECTION, SOLUTION INTRAVENOUS at 07:28

## 2017-01-01 RX ADMIN — POTASSIUM CHLORIDE 40 MEQ: 1500 TABLET, EXTENDED RELEASE ORAL at 14:46

## 2017-01-01 RX ADMIN — DOXEPIN HYDROCHLORIDE 10 MG: 10 CAPSULE ORAL at 18:04

## 2017-01-01 RX ADMIN — ENOXAPARIN SODIUM 40 MG: 40 INJECTION SUBCUTANEOUS at 11:55

## 2017-01-01 RX ADMIN — ESTRADIOL 1 MG: 1 TABLET ORAL at 10:05

## 2017-01-01 RX ADMIN — SODIUM CHLORIDE 5 MG/HR: 900 INJECTION, SOLUTION INTRAVENOUS at 18:00

## 2017-01-01 RX ADMIN — POTASSIUM CHLORIDE 40 MEQ: 1500 TABLET, EXTENDED RELEASE ORAL at 18:02

## 2017-01-01 RX ADMIN — DOXEPIN HYDROCHLORIDE 10 MG: 10 CAPSULE ORAL at 21:21

## 2017-01-01 RX ADMIN — SODIUM CHLORIDE 5 MG/HR: 900 INJECTION, SOLUTION INTRAVENOUS at 21:21

## 2017-01-01 RX ADMIN — SODIUM CHLORIDE 250 ML: 900 INJECTION, SOLUTION INTRAVENOUS at 18:54

## 2017-01-01 RX ADMIN — SODIUM BICARBONATE 50 MEQ: 84 INJECTION, SOLUTION INTRAVENOUS at 16:10

## 2017-01-01 RX ADMIN — GABAPENTIN 600 MG: 300 CAPSULE ORAL at 22:13

## 2017-01-01 RX ADMIN — ENOXAPARIN SODIUM 40 MG: 40 INJECTION SUBCUTANEOUS at 12:13

## 2017-01-01 RX ADMIN — ASPIRIN 81 MG 81 MG: 81 TABLET ORAL at 09:06

## 2017-01-01 RX ADMIN — ASPIRIN 81 MG 81 MG: 81 TABLET ORAL at 09:27

## 2017-01-01 RX ADMIN — ENOXAPARIN SODIUM 40 MG: 40 INJECTION SUBCUTANEOUS at 12:43

## 2017-01-01 RX ADMIN — DOXEPIN HYDROCHLORIDE 10 MG: 10 CAPSULE ORAL at 21:57

## 2017-01-01 RX ADMIN — SODIUM CHLORIDE 250 ML: 900 INJECTION, SOLUTION INTRAVENOUS at 10:31

## 2017-01-01 RX ADMIN — SODIUM CHLORIDE 500 ML: 900 INJECTION, SOLUTION INTRAVENOUS at 05:53

## 2017-01-01 RX ADMIN — POTASSIUM CHLORIDE 40 MEQ: 1500 TABLET, EXTENDED RELEASE ORAL at 17:49

## 2017-01-01 RX ADMIN — SODIUM CHLORIDE 5 MG/HR: 900 INJECTION, SOLUTION INTRAVENOUS at 13:42

## 2017-01-01 RX ADMIN — ENOXAPARIN SODIUM 40 MG: 40 INJECTION SUBCUTANEOUS at 15:18

## 2017-01-01 RX ADMIN — MAGNESIUM SULFATE HEPTAHYDRATE 2 G: 40 INJECTION, SOLUTION INTRAVENOUS at 11:45

## 2017-01-01 RX ADMIN — SODIUM POLYSTYRENE SULFONATE 30 G: 15 SUSPENSION ORAL; RECTAL at 16:11

## 2017-01-01 RX ADMIN — ERGOCALCIFEROL 50000 UNITS: 1.25 CAPSULE ORAL at 15:18

## 2017-01-01 RX ADMIN — GABAPENTIN 600 MG: 300 CAPSULE ORAL at 21:56

## 2017-01-01 RX ADMIN — SODIUM CHLORIDE 5 MG/HR: 900 INJECTION, SOLUTION INTRAVENOUS at 21:58

## 2017-01-01 RX ADMIN — POTASSIUM CHLORIDE 20 MEQ: 20 TABLET, EXTENDED RELEASE ORAL at 09:00

## 2017-01-01 RX ADMIN — ONDANSETRON HYDROCHLORIDE 4 MG: 2 SOLUTION INTRAMUSCULAR; INTRAVENOUS at 16:41

## 2017-01-01 RX ADMIN — LEVOFLOXACIN 250 MG: 5 INJECTION, SOLUTION INTRAVENOUS at 10:26

## 2017-01-01 RX ADMIN — LEVOFLOXACIN 250 MG: 5 INJECTION, SOLUTION INTRAVENOUS at 10:04

## 2017-01-01 RX ADMIN — HYDROCODONE BITARTRATE AND ACETAMINOPHEN 1 TABLET: 5; 325 TABLET ORAL at 18:09

## 2017-01-01 RX ADMIN — POTASSIUM CHLORIDE 40 MEQ: 1500 TABLET, EXTENDED RELEASE ORAL at 09:27

## 2017-01-01 RX ADMIN — ALPRAZOLAM: 0.25 TABLET ORAL at 22:13

## 2017-01-01 RX ADMIN — SODIUM BICARBONATE 50 MEQ: 84 INJECTION, SOLUTION INTRAVENOUS at 12:13

## 2017-01-01 RX ADMIN — ESTRADIOL 1 MG: 1 TABLET ORAL at 10:26

## 2017-01-01 RX ADMIN — LEVOFLOXACIN 250 MG: 5 INJECTION, SOLUTION INTRAVENOUS at 09:06

## 2017-01-01 RX ADMIN — ESTRADIOL 1 MG: 1 TABLET ORAL at 09:07

## 2017-01-01 RX ADMIN — INSULIN HUMAN 10 UNITS: 100 INJECTION, SOLUTION PARENTERAL at 17:46

## 2017-01-01 RX ADMIN — ESTRADIOL 1 MG: 1 TABLET ORAL at 09:26

## 2017-01-01 RX ADMIN — LEVOFLOXACIN 500 MG: 5 INJECTION, SOLUTION INTRAVENOUS at 08:04

## 2017-01-04 PROBLEM — M79.10 MYALGIA: Status: ACTIVE | Noted: 2017-01-01

## 2017-01-04 NOTE — PROGRESS NOTES
1. Have you been to the ER, urgent care clinic since your last visit? Hospitalized since your last visit? No    2. Have you seen or consulted any other health care providers outside of the [address] [place] [address] since your last visit? Include any pap smears or colon screening.  No

## 2017-01-04 NOTE — LETTER
17 RE:  Hudson Cook :  11/15/1933 Please provide 2 pairs of knee high medium pressure hose - 30 to 35 mm Hg  for Hudson Cook Diagnosis: ICD-10-CM ICD-9-CM 1. Bilateral edema of lower extremity R60.0 782. 3 Thank you. Sincerely, Franco Gil M.D.   FACP

## 2017-01-05 NOTE — TELEPHONE ENCOUNTER
Julia from [address] [facility] called concerning miss [employer] issues please return their call to #[phone].       Oxy level Resting 90-91  Oxy 87-88 not Resting

## 2017-01-05 NOTE — PROGRESS NOTES
The patient presents to the office today with the chief complaint of weakness    HPI    The patient persists with leg weakness but she feels quite improved. The patient is ambulating much better. There is decreased myalgias. She persists with mild dyspnea. She is using albuterol but she does not feel that it does not reach her bronchial tubes. Review of Systems   Respiratory: Positive for shortness of breath (Mild dyspnea). Cardiovascular: Positive for leg swelling. Negative for chest pain. Musculoskeletal: Myalgias: Mild myalgias. Neurological: Positive for weakness (Mild weakness). Allergies   Allergen Reactions    Zithromax [Azithromycin] Nausea Only    Sulfa (Sulfonamide Antibiotics) Itching    Penicillins Other (comments)     Yeast infection       Current Outpatient Prescriptions   Medication Sig Dispense Refill    inhalational spacing device Use with the inhaler 1 Device 0    ergocalciferol (VITAMIN D2) 50,000 unit capsule 1 capsule daily 12 Cap 3    predniSONE (DELTASONE) 10 mg tablet 3 tabs daily x 3 days, 2 tabs daily x 3 days, 1 tab daily x 3 days, 1/2 tab daily x 3 days 20 Tab 0    promethazine (PHENERGAN) 25 mg tablet Take 25 mg by mouth every eight (8) hours as needed for Nausea.       PROAIR HFA 90 mcg/actuation inhaler INHALE 2 PUFFS BY MOUTH 3 TIMES A DAY AS NEEDED FOR CHEST CONGESTION 8.5 Inhaler 0    doxepin (SINEQUAN) 10 mg capsule 1 tablet at bedtime 90 Cap 3    ALPRAZolam (XANAX) 0.25 mg tablet 1/2 tablet twice per day as needed for stress 30 Tab 1    estradiol (ESTRACE) 1 mg tablet TAKE 1 TABLET BY MOUTH EVERY DAY 90 Tab 0    hydrochlorothiazide (HYDRODIURIL) 50 mg tablet TAKE 1 TABLET BY MOUTH EVERY MORNING 90 Tab 1    gabapentin (NEURONTIN) 300 mg capsule TAKE 2 CAPSULE BY MOUTH AT BEDTIME 180 Cap 3    NIFEdipine ER (PROCARDIA XL) 30 mg ER tablet TAKE 1 TABLET BY MOUTH DAILY 90 Tab 3       Past Medical History   Diagnosis Date    Acute bronchitis     Acute upper respiratory infections of unspecified site     Essential hypertension, benign     Fibular collateral ligament bursitis     Insomnia, unspecified     Myalgia and myositis, unspecified     Pure hypercholesterolemia     Raynaud's syndrome        Past Surgical History   Procedure Laterality Date    Hx hysterectomy      Hx appendectomy      Hx cholecystectomy      Hx bladder suspension      Hx other surgical Right      foot surgery       Social History     Social History    Marital status:      Spouse name: N/A    Number of children: N/A    Years of education: N/A     Occupational History    Not on file. Social History Main Topics    Smoking status: Former Smoker     Quit date: 1/1/1992    Smokeless tobacco: Never Used    Alcohol use Yes      Comment: occas    Drug use: No    Sexual activity: No     Other Topics Concern    Not on file     Social History Narrative       Patient does not have an advanced directive on file    Visit Vitals    /64 (BP 1 Location: Left arm, BP Patient Position: Sitting)    Pulse 88    Temp 97.2 °F (36.2 °C) (Tympanic)    Resp 16    Ht 5' 4\" (1.626 m)    Wt 157 lb (71.2 kg)    SpO2 (!) 87%    BMI 26.95 kg/m2       Physical Exam   Cardiovascular: Normal rate and regular rhythm. Exam reveals no gallop. No murmur heard. Pulmonary/Chest: She has no wheezes. She has no rales. Abdominal: Soft. She exhibits no distension. There is no tenderness. Musculoskeletal: She exhibits edema (1+ LE Edema).        Hospital Outpatient Visit on 12/27/2016   Component Date Value Ref Range Status    C-Reactive protein 12/27/2016 4.5* 0 - 0.3 mg/dL Final    CK 12/27/2016 35  26 - 192 U/L Final    Sed rate, automated 12/27/2016 11  0 - 30 mm/hr Final   Admission on 12/21/2016, Discharged on 12/24/2016   Component Date Value Ref Range Status    Ventricular Rate 12/21/2016 86  BPM Final    Atrial Rate 12/21/2016 86  BPM Final    P-R Interval 12/21/2016 158 ms Final    QRS Duration 12/21/2016 74  ms Final    Q-T Interval 12/21/2016 438  ms Final    QTC Calculation (Bezet) 12/21/2016 524  ms Final    Calculated P Axis 12/21/2016 57  degrees Final    Calculated R Axis 12/21/2016 108  degrees Final    Calculated T Axis 12/21/2016 -37  degrees Final    Diagnosis 12/21/2016    Final                    Value:Normal sinus rhythm with sinus arrhythmia  Rightward axis  Nonspecific ST and T wave abnormality  Prolonged QT  Abnormal ECG  When compared with ECG of 09-JUL-2013 07:44,  T wave inversion now evident in Inferior leads  Nonspecific T wave abnormality now evident in Anterolateral leads  QT has lengthened  Confirmed by Riley Nunez ([id number]) on 12/22/2016 10:10:17 AM      WBC 12/21/2016 13.2  4.6 - 13.2 K/uL Final    RBC 12/21/2016 4.47  4.20 - 5.30 M/uL Final    HGB 12/21/2016 12.6  12.0 - 16.0 g/dL Final    HCT 12/21/2016 38.1  35.0 - 45.0 % Final    MCV 12/21/2016 85.2  74.0 - 97.0 FL Final    MCH 12/21/2016 28.2  24.0 - 34.0 PG Final    MCHC 12/21/2016 33.1  31.0 - 37.0 g/dL Final    RDW 12/21/2016 17.6* 11.6 - 14.5 % Final    PLATELET 98/25/6082 290  135 - 420 K/uL Final    MPV 12/21/2016 10.0  9.2 - 11.8 FL Final    NEUTROPHILS 12/21/2016 90* 40 - 73 % Final    LYMPHOCYTES 12/21/2016 5* 21 - 52 % Final    MONOCYTES 12/21/2016 5  3 - 10 % Final    EOSINOPHILS 12/21/2016 0  0 - 5 % Final    BASOPHILS 12/21/2016 0  0 - 2 % Final    ABS. NEUTROPHILS 12/21/2016 12.0* 1.8 - 8.0 K/UL Final    ABS. LYMPHOCYTES 12/21/2016 0.7* 0.9 - 3.6 K/UL Final    ABS. MONOCYTES 12/21/2016 0.6  0.05 - 1.2 K/UL Final    ABS. EOSINOPHILS 12/21/2016 0.0  0.0 - 0.4 K/UL Final    ABS.  BASOPHILS 12/21/2016 0.0  0.0 - 0.1 K/UL Final    DF 12/21/2016 AUTOMATED    Final    Sodium 12/21/2016 138  136 - 145 mmol/L Final    Potassium 12/21/2016 3.6  3.5 - 5.5 mmol/L Final    Chloride 12/21/2016 101  100 - 108 mmol/L Final    CO2 12/21/2016 27  21 - 32 mmol/L Final    Anion gap 12/21/2016 10  3.0 - 18 mmol/L Final    Glucose 12/21/2016 143* 74 - 99 mg/dL Final    BUN 12/21/2016 24* 7.0 - 18 MG/DL Final    Creatinine 12/21/2016 0.95  0.6 - 1.3 MG/DL Final    BUN/Creatinine ratio 12/21/2016 25* 12 - 20   Final    GFR est AA 12/21/2016 >60  >60 ml/min/1.73m2 Final    GFR est non-AA 12/21/2016 56* >60 ml/min/1.73m2 Final    Comment: (NOTE)  Estimated GFR is calculated using the Modification of Diet in Renal   Disease (MDRD) Study equation, reported for both  Americans   (GFRAA) and non- Americans (GFRNA), and normalized to 1.73m2   body surface area. The physician must decide which value applies to   the patient. The MDRD study equation should only be used in   individuals age 25 or older. It has not been validated for the   following: pregnant women, patients with serious comorbid conditions,   or on certain medications, or persons with extremes of body size,   muscle mass, or nutritional status.  Calcium 12/21/2016 8.2* 8.5 - 10.1 MG/DL Final    Bilirubin, total 12/21/2016 0.7  0.2 - 1.0 MG/DL Final    ALT 12/21/2016 42  13 - 56 U/L Final    AST 12/21/2016 42* 15 - 37 U/L Final    Alk.  phosphatase 12/21/2016 38* 45 - 117 U/L Final    Protein, total 12/21/2016 6.1* 6.4 - 8.2 g/dL Final    Albumin 12/21/2016 3.0* 3.4 - 5.0 g/dL Final    Globulin 12/21/2016 3.1  2.0 - 4.0 g/dL Final    A-G Ratio 12/21/2016 1.0  0.8 - 1.7   Final    Magnesium 12/21/2016 1.5* 1.8 - 2.4 mg/dL Final    Prothrombin time 12/21/2016 13.9  11.5 - 15.2 sec Final    INR 12/21/2016 1.1  0.8 - 1.2   Final    Comment:            INR Therapeutic Ranges         (on stable oral anticoagulant):     INDICATION                INR  DVT/PE/Atrial Fib          2.0-3.0  MI/Mechanical Heart Valve  2.5-3.5      aPTT 12/21/2016 29.1  23.0 - 36.4 SEC Final    CK 12/21/2016 103  26 - 192 U/L Final    CK - MB 12/21/2016 2.5  0.5 - 3.6 ng/ml Final    CK-MB Index 12/21/2016 2.4  0.0 - 4.0 % Final    Troponin-I, Qt. 12/21/2016 0.03  0.0 - 0.045 NG/ML Final    Comment: The presence of detectable troponin above the reference range indicates myocardial injury which may be due to ischemia, myocarditis, trauma, etc.  Clinical correlation is necessary to establish the significance of this finding. Sequential testing is recommended to determine if the typical rise and fall of cTnI is demonstrated. Note:  Cardiac troponin I has a relatively long half life and may be present well after the CK MB has returned to baseline. The reference range is based on the 99th percentile of the referent population.  Color 12/21/2016 YELLOW    Final    Appearance 12/21/2016 CLEAR    Final    Specific gravity 12/21/2016 1.011  1.005 - 1.030   Final    pH (UA) 12/21/2016 5.5  5.0 - 8.0   Final    Protein 12/21/2016 30* NEG mg/dL Final    Glucose 12/21/2016 NEGATIVE   NEG mg/dL Final    Ketone 12/21/2016 NEGATIVE   NEG mg/dL Final    Bilirubin 12/21/2016 NEGATIVE   NEG   Final    Blood 12/21/2016 NEGATIVE   NEG   Final    Urobilinogen 12/21/2016 1.0  0.2 - 1.0 EU/dL Final    Nitrites 12/21/2016 NEGATIVE   NEG   Final    Leukocyte Esterase 12/21/2016 TRACE* NEG   Final    CK 12/21/2016 97  26 - 192 U/L Final    CK - MB 12/21/2016 2.7  0.5 - 3.6 ng/ml Final    CK-MB Index 12/21/2016 2.8  0.0 - 4.0 % Final    Troponin-I, Qt. 12/21/2016 0.03  0.0 - 0.045 NG/ML Final    Comment: The presence of detectable troponin above the reference range indicates myocardial injury which may be due to ischemia, myocarditis, trauma, etc.  Clinical correlation is necessary to establish the significance of this finding. Sequential testing is recommended to determine if the typical rise and fall of cTnI is demonstrated. Note:  Cardiac troponin I has a relatively long half life and may be present well after the CK MB has returned to baseline. The reference range is based on the 99th percentile of the referent population.       WBC 12/21/2016 2 to 4  0 - 4 /hpf Final    RBC 12/21/2016 NONE  0 - 5 /hpf Final    Epithelial cells 12/21/2016 3+  0 - 5 /lpf Final    Bacteria 12/21/2016 FEW* NEG /hpf Final    CK 12/21/2016 99  26 - 192 U/L Final    CK - MB 12/21/2016 3.8* 0.5 - 3.6 ng/ml Final    CK-MB Index 12/21/2016 3.8  0.0 - 4.0 % Final    Troponin-I, Qt. 12/21/2016 0.04  0.0 - 0.045 NG/ML Final    Comment: The presence of detectable troponin above the reference range indicates myocardial injury which may be due to ischemia, myocarditis, trauma, etc.  Clinical correlation is necessary to establish the significance of this finding. Sequential testing is recommended to determine if the typical rise and fall of cTnI is demonstrated. Note:  Cardiac troponin I has a relatively long half life and may be present well after the CK MB has returned to baseline. The reference range is based on the 99th percentile of the referent population.  CK 12/22/2016 89  26 - 192 U/L Final    CK - MB 12/22/2016 3.0  0.5 - 3.6 ng/ml Final    CK-MB Index 12/22/2016 3.4  0.0 - 4.0 % Final    Troponin-I, Qt. 12/22/2016 0.05* 0.0 - 0.045 NG/ML Final    Comment: The presence of detectable troponin above the reference range indicates myocardial injury which may be due to ischemia, myocarditis, trauma, etc.  Clinical correlation is necessary to establish the significance of this finding. Sequential testing is recommended to determine if the typical rise and fall of cTnI is demonstrated. Note:  Cardiac troponin I has a relatively long half life and may be present well after the CK MB has returned to baseline. The reference range is based on the 99th percentile of the referent population.       Hemoglobin A1c 12/21/2016 6.1* 4.2 - 5.6 % Final    Comment: (NOTE)  HbA1C Interpretive Ranges  <5.7              Normal  5.7 - 6.4         Consider Prediabetes  >6.5              Consider Diabetes      LIPID PROFILE 12/21/2016        Final    Cholesterol, total 12/21/2016 181  <200 MG/DL Final    Triglyceride 12/21/2016 94  <150 MG/DL Final    HDL Cholesterol 12/21/2016 34* 40 - 60 MG/DL Final    LDL, calculated 12/21/2016 128.2* 0 - 100 MG/DL Final    VLDL, calculated 12/21/2016 18.8  MG/DL Final    CHOL/HDL Ratio 12/21/2016 5.3* 0 - 5.0   Final    TSH 12/21/2016 0.74  0.36 - 3.74 uIU/mL Final    Ventricular Rate 12/22/2016 90  BPM Final    Atrial Rate 12/22/2016 90  BPM Final    P-R Interval 12/22/2016 142  ms Final    QRS Duration 12/22/2016 82  ms Final    Q-T Interval 12/22/2016 388  ms Final    QTC Calculation (Bezet) 12/22/2016 474  ms Final    Calculated P Axis 12/22/2016 49  degrees Final    Calculated R Axis 12/22/2016 110  degrees Final    Calculated T Axis 12/22/2016 15  degrees Final    Diagnosis 12/22/2016    Final                    Value:Normal sinus rhythm  Rightward axis  Abnormal ECG  When compared with ECG of 21-DEC-2016 18:56,  Nonspecific T wave abnormality no longer evident in Anterolateral leads  Confirmed by Beni Espinoza ([id number]) on 12/22/2016 10:02:45 AM      Magnesium 12/22/2016 1.7* 1.8 - 2.4 mg/dL Final    Sodium 12/22/2016 137  136 - 145 mmol/L Final    Potassium 12/22/2016 3.4* 3.5 - 5.5 mmol/L Final    Chloride 12/22/2016 104  100 - 108 mmol/L Final    CO2 12/22/2016 22  21 - 32 mmol/L Final    Anion gap 12/22/2016 11  3.0 - 18 mmol/L Final    Glucose 12/22/2016 130* 74 - 99 mg/dL Final    BUN 12/22/2016 26* 7.0 - 18 MG/DL Final    Creatinine 12/22/2016 0.96  0.6 - 1.3 MG/DL Final    BUN/Creatinine ratio 12/22/2016 27* 12 - 20   Final    GFR est AA 12/22/2016 >60  >60 ml/min/1.73m2 Final    GFR est non-AA 12/22/2016 56* >60 ml/min/1.73m2 Final    Calcium 12/22/2016 8.6  8.5 - 10.1 MG/DL Final   Hospital Outpatient Visit on 10/19/2016   Component Date Value Ref Range Status    WBC 10/19/2016 5.9  4.6 - 13.2 K/uL Final    RBC 10/19/2016 4.69  4.20 - 5.30 M/uL Final    HGB 10/19/2016 13.1  12.0 - 16.0 g/dL Final    HCT 10/19/2016 41.9  35.0 - 45.0 % Final    MCV 10/19/2016 89.3  74.0 - 97.0 FL Final    MCH 10/19/2016 27.9  24.0 - 34.0 PG Final    MCHC 10/19/2016 31.3  31.0 - 37.0 g/dL Final    RDW 10/19/2016 18.5* 11.6 - 14.5 % Final    PLATELET 67/22/7898 335  135 - 420 K/uL Final    MPV 10/19/2016 9.9  9.2 - 11.8 FL Final    NEUTROPHILS 10/19/2016 71  40 - 73 % Final    LYMPHOCYTES 10/19/2016 16* 21 - 52 % Final    MONOCYTES 10/19/2016 9  3 - 10 % Final    EOSINOPHILS 10/19/2016 3  0 - 5 % Final    BASOPHILS 10/19/2016 1  0 - 2 % Final    ABS. NEUTROPHILS 10/19/2016 4.2  1.8 - 8.0 K/UL Final    ABS. LYMPHOCYTES 10/19/2016 0.9  0.9 - 3.6 K/UL Final    ABS. MONOCYTES 10/19/2016 0.5  0.05 - 1.2 K/UL Final    ABS. EOSINOPHILS 10/19/2016 0.2  0.0 - 0.4 K/UL Final    ABS. BASOPHILS 10/19/2016 0.0  0.0 - 0.06 K/UL Final    DF 10/19/2016 AUTOMATED    Final    Sodium 10/19/2016 141  136 - 145 mmol/L Final    Potassium 10/19/2016 4.1  3.5 - 5.5 mmol/L Final    Chloride 10/19/2016 104  100 - 108 mmol/L Final    CO2 10/19/2016 28  21 - 32 mmol/L Final    Anion gap 10/19/2016 9  3.0 - 18 mmol/L Final    Glucose 10/19/2016 85  74 - 99 mg/dL Final    BUN 10/19/2016 21* 7.0 - 18 MG/DL Final    Creatinine 10/19/2016 0.92  0.6 - 1.3 MG/DL Final    BUN/Creatinine ratio 10/19/2016 23* 12 - 20   Final    GFR est AA 10/19/2016 >60  >60 ml/min/1.73m2 Final    GFR est non-AA 10/19/2016 58* >60 ml/min/1.73m2 Final    Comment: (NOTE)  Estimated GFR is calculated using the Modification of Diet in Renal   Disease (MDRD) Study equation, reported for both  Americans   (GFRAA) and non- Americans (GFRNA), and normalized to 1.73m2   body surface area. The physician must decide which value applies to   the patient. The MDRD study equation should only be used in   individuals age 25 or older.  It has not been validated for the   following: pregnant women, patients with serious comorbid conditions,   or on certain medications, or persons with extremes of body size,   muscle mass, or nutritional status.  Calcium 10/19/2016 8.8  8.5 - 10.1 MG/DL Final    Bilirubin, total 10/19/2016 0.4  0.2 - 1.0 MG/DL Final    ALT 10/19/2016 20  13 - 56 U/L Final    AST 10/19/2016 16  15 - 37 U/L Final    Alk. phosphatase 10/19/2016 33* 45 - 117 U/L Final    Protein, total 10/19/2016 6.4  6.4 - 8.2 g/dL Final    Albumin 10/19/2016 3.2* 3.4 - 5.0 g/dL Final    Globulin 10/19/2016 3.2  2.0 - 4.0 g/dL Final    A-G Ratio 10/19/2016 1.0  0.8 - 1.7   Final    Cholesterol, total 10/19/2016 212* <200 MG/DL Final    Calcitriol (Vit D 1, 25 di-OH) 10/19/2016 41.7  19.9 - 79.3 pg/mL Final    Comment: (NOTE)  Performed At: [address] [id number]  Radha Rasheed MD [id number]         . No results found for any visits on 01/04/17. Assessment / Plan      ICD-10-CM ICD-9-CM    1. Myalgia M79.1 729.1    2. Bilateral edema of lower extremity R60.0 782.3    3. Essential hypertension, benign I10 401.1      Continue taper of Prednisone and discontinue the prednisone - follow the symptoms off the steroids. Consider restarting the Prednisone if the symptoms return  she was advised to continue her maintenance medications  Support Hose   Use a spacer    Follow-up Disposition:  Return in about 4 weeks (around 2/1/2017). I asked Shannon Ramirez if she has any questions and I answered the questions.   Shannon Ramirez states that she understands the treatment plan and agrees with the treatment plan

## 2017-01-06 NOTE — TELEPHONE ENCOUNTER
Per Dr Yanely Ruiz can have patient stop back by the office to recheck oxygen sat again or we can try to call [facility] to see if they can send someone out to her house to recheck her as patient runs on the lower 90s upper 80s when she's in our office.

## 2017-01-11 NOTE — PROGRESS NOTES
Ms. Nidhi Montero has a reminder for a \"due or due soon\" health maintenance. I have asked that she contact her primary care provider for follow-up on this health maintenance.

## 2017-01-11 NOTE — PROGRESS NOTES
HISTORY OF PRESENT ILLNESS  Kenisha Felix is a 80 y.o. female. HPI Comments: Referred by Dr. Aquilino Del Cid for abnormal PFT's and CT chest. Pt is an ex smoker with several years of shortness of breath on exertion associated with wheezing and cough. She was at a local grocery when she felt \"ill\" and felt that she was too sick to drive home. She called a friend who brought her home where she had an episode of syncope and was admitted to [facility]. CTA was negative for PE but hed other abnormalities, see below. Of note, review of Echo from that admission shoed normal EF but elevated right heart pressures and a small pericardial effusion. Discharge diagnosis was probable arrhythmia from prolonged QT aggravated by antibiotic Zithromax. I did not see ABG. Review of Systems   Constitutional: Positive for malaise/fatigue. Negative for chills, diaphoresis, fever and weight loss. HENT: Negative for congestion, ear discharge, ear pain, hearing loss, nosebleeds, sore throat and tinnitus. Eyes: Negative for blurred vision, double vision, photophobia, pain, discharge and redness. Respiratory: Positive for shortness of breath and wheezing. Negative for hemoptysis, sputum production and stridor. Cough: rare. Cardiovascular: Positive for leg swelling. Negative for chest pain, palpitations, orthopnea, claudication and PND. Gastrointestinal: Negative for abdominal pain, blood in stool, constipation, diarrhea, heartburn, melena, nausea and vomiting. Genitourinary: Negative for dysuria, flank pain, frequency, hematuria and urgency. Musculoskeletal: Negative for back pain, falls, joint pain, myalgias and neck pain. Skin: Negative for itching and rash. Neurological: Positive for dizziness. Negative for tingling, tremors, sensory change, speech change, focal weakness, seizures, loss of consciousness, weakness and headaches. Endo/Heme/Allergies: Negative for environmental allergies. Does not bruise/bleed easily. Psychiatric/Behavioral: Negative for depression, hallucinations, memory loss, substance abuse and suicidal ideas. The patient has insomnia. The patient is not nervous/anxious. Past Medical History   Diagnosis Date    Acute bronchitis     Acute upper respiratory infections of unspecified site     Essential hypertension, benign     Fibular collateral ligament bursitis     Insomnia, unspecified     Myalgia and myositis, unspecified     Pure hypercholesterolemia     Raynaud's syndrome      Past Surgical History   Procedure Laterality Date    Hx hysterectomy      Hx appendectomy      Hx cholecystectomy      Hx bladder suspension      Hx other surgical Right      foot surgery     Current Outpatient Prescriptions on File Prior to Visit   Medication Sig Dispense Refill    inhalational spacing device Use with the inhaler 1 Device 0    ergocalciferol (VITAMIN D2) 50,000 unit capsule 1 capsule daily 12 Cap 3    promethazine (PHENERGAN) 25 mg tablet Take 25 mg by mouth every eight (8) hours as needed for Nausea.  PROAIR HFA 90 mcg/actuation inhaler INHALE 2 PUFFS BY MOUTH 3 TIMES A DAY AS NEEDED FOR CHEST CONGESTION 8.5 Inhaler 0    doxepin (SINEQUAN) 10 mg capsule 1 tablet at bedtime 90 Cap 3    ALPRAZolam (XANAX) 0.25 mg tablet 1/2 tablet twice per day as needed for stress 30 Tab 1    estradiol (ESTRACE) 1 mg tablet TAKE 1 TABLET BY MOUTH EVERY DAY 90 Tab 0    hydrochlorothiazide (HYDRODIURIL) 50 mg tablet TAKE 1 TABLET BY MOUTH EVERY MORNING 90 Tab 1    gabapentin (NEURONTIN) 300 mg capsule TAKE 2 CAPSULE BY MOUTH AT BEDTIME 180 Cap 3    NIFEdipine ER (PROCARDIA XL) 30 mg ER tablet TAKE 1 TABLET BY MOUTH DAILY 90 Tab 3    predniSONE (DELTASONE) 10 mg tablet 3 tabs daily x 3 days, 2 tabs daily x 3 days, 1 tab daily x 3 days, 1/2 tab daily x 3 days 20 Tab 0     No current facility-administered medications on file prior to visit.       Allergies   Allergen Reactions    Zithromax [Azithromycin] Nausea Only    Sulfa (Sulfonamide Antibiotics) Itching    Penicillins Other (comments)     Yeast infection     Family History   Problem Relation Age of Onset    Heart Disease Mother     Heart Disease Father     Other Sister      CNS aneurysm     Social History     Social History    Marital status:      Spouse name: N/A    Number of children: N/A    Years of education: N/A     Occupational History    Not on file. Social History Main Topics    Smoking status: Former Smoker     Packs/day: 1.00     Years: 20.00     Quit date: 1/1/1992    Smokeless tobacco: Never Used    Alcohol use Yes      Comment: occas    Drug use: No    Sexual activity: No     Other Topics Concern    Not on file     Social History Narrative     Blood pressure 98/60, pulse 96, temperature 97.4 °F (36.3 °C), temperature source Oral, resp. rate 18, height 5' 4\" (1.626 m), weight 70.8 kg (156 lb), SpO2 (!) 88 %. Note SpO2 innacurate due to artificial nails. Physical Exam   Constitutional: She is oriented to person, place, and time. She appears well-developed and well-nourished. No distress. Walks with a cane   HENT:   Head: Normocephalic and atraumatic. Nose: Nose normal.   Mouth/Throat: Oropharynx is clear and moist. No oropharyngeal exudate. Eyes: Conjunctivae are normal. Pupils are equal, round, and reactive to light. Right eye exhibits no discharge. Left eye exhibits no discharge. No scleral icterus. Neck: No JVD present. No tracheal deviation present. No thyromegaly present. Cardiovascular: Normal rate, regular rhythm, normal heart sounds and intact distal pulses. Exam reveals no gallop and no friction rub. No murmur heard. Pulmonary/Chest: Effort normal. No stridor. No respiratory distress. She has no wheezes. She has no rales. She exhibits no tenderness. Distant breath sounds   Abdominal: Soft. She exhibits no mass. There is no tenderness. Musculoskeletal: She exhibits no tenderness.  Edema: 2+ both ankles. Lymphadenopathy:     She has no cervical adenopathy. Neurological: She is alert and oriented to person, place, and time. Skin: Skin is warm and dry. No rash noted. She is not diaphoretic. No erythema. Psychiatric: She has a normal mood and affect. Her behavior is normal. Judgment and thought content normal.     CT Results (most recent):    Results from Hospital Encounter encounter on 12/21/16   CTA CHEST W WO CONT   Narrative CTA CHEST    CPT code: 75066    History: Abnormal PTE with pulmonary hypertension; chest pain, shortness of  breath. Findings: Helical axial contrast-enhanced images of the chest are obtained by  using a pulmonary artery vascular imaging protocol. Additional image  post-processing was performed including maximum intensity projection (MIP)  images of the pulmonary vasculature for better depiction of vascular  relationships and measurements. Contrast: Isovue-370 - 100 mL IV; uneventful  administration. There is adequate bolus enhancement of the pulmonary artery vasculature. There  is no evidence of intraluminal thrombus to suggest pulmonary embolism. The  visualized portion of the neck is unremarkable. Findings interstitial and small nodular opacities in the left lingula. Bilateral pleural effusions are present, right greater than left and there is  some associated posterior base peripheral atelectasis. The major airways are  patent. There is a mild pericardial effusion. Several small paratracheal lymph  nodes are present with short-axes up to 7 mm, nonspecific. No esophageal  abnormalities are seen. No other cardiovascular abnormalities are seen. The  visualized portion of the abdomen is unremarkable except for a mild amount of  ascites surrounding the liver anterolaterally and under the diaphragm. The  bones and soft tissues are unremarkable. Impression IMPRESSION[de-identified]     No evidence of pulmonary embolus.     Patchy interstitial nodular infiltrates/fibrosis in the left lingula. Bilateral posterior effusions with posterior base atelectasis, right somewhat  greater than left. No significant mediastinal adenopathy. Mild ascites, mainly  perihepatic. ASSESSMENT and PLAN  Encounter Diagnoses   Name Primary?  COPD, moderate ([address] Yes    Raynaud's disease without gangrene     Syncope, unspecified syncope type     Pulmonary HTN ([address]         Pt with shortness of breath likely multifactorial as she does have evidence of COPD and Pulmonary HTN. Pulmonary HTN possibly due to chronic lung disease and hypoxemia but will need to rule out other causes as well. CTD and HIV testing ordered. Pt will need oximetry both ambulatory and overnight. Pt as been instructed to remove artificial nails prior to oximetry. Based on PFT's and symptoms will start pt on Anoro and rescue Albtuerol. Sample given and pt instructed on proper inhaler technique. As for her CT abnormalities, would follow up CT in 6 months but changes appear to be likely inflammatory rather then malignant. Pt also encouraged to call 911 immediately for similar symptoms rather than waiting for her neighbors. RTC 4 weeks.

## 2017-01-11 NOTE — MR AVS SNAPSHOT
Visit Information Date & Time Provider Department Dept. Phone Encounter #  
 1/11/2017 12:00 PM Alka Tavares MD [facility] [id number] Follow-up Instructions Return in about 4 weeks (around 2/8/2017). Your Appointments 2/1/2017  8:45 AM  
Follow Up with Tino Moss MD  
[facility] Appt Note: 1mo  
 [address], [address] [facility] [address]  
  
   
 [address], [address] [facility] [address]  
  
    
 2/8/2017 11:15 AM  
ESTABLISHED PATIENT with Jazmin Kimball MD  
[facility]) Appt Note: h/f  
 [address] [facility], [address] [facility] [address]  
[address] [facility] [address] [address] [place] [address] 2/14/2017  8:00 AM  
Follow Up with Tino Moss MD  
[facility]) Appt Note: 4 month [address], [address] [facility] [address]  
[phone] Upcoming Health Maintenance Date Due DTaP/Tdap/Td series (1 - Tdap) 11[birth year]15/1954 ZOSTER VACCINE AGE 60> 11/15/1993 GLAUCOMA SCREENING Q2Y 11/15/1998 OSTEOPOROSIS SCREENING (DEXA) 11/15/1998 Pneumococcal 65+ Low/Medium Risk (1 of 2 - PCV13) 11/15/1998 MEDICARE YEARLY EXAM 11/15/1998 Allergies as of 1/11/2017  Review Complete On: 1/11/2017 By: Alka Tavares MD  
  
 Severity Noted Reaction Type Reactions Zithromax [Azithromycin] High 12/21/2016   Side Effect Nausea Only Sulfa (Sulfonamide Antibiotics) Medium   Itching Penicillins  12/22/2016    Other (comments) Yeast infection Current Immunizations  Never Reviewed Name Date Influenza High Dose Vaccine PF 10/19/2016  9:30 AM  
 Influenza Vaccine (Quad) 10/7/2015 Not reviewed this visit You Were Diagnosed With   
  
 Codes Comments COPD, moderate ([facility]    -  Primary ICD-10-CM: J44.9 ICD-9-CM: 624 Raynaud's disease without gangrene     ICD-10-CM: I73.00 ICD-9-CM: 443.0 Syncope, unspecified syncope type     ICD-10-CM: R55 
ICD-9-CM: 780.2 Pulmonary HTN ([facility]     ICD-10-CM: I27.2 ICD-9-CM: 416.8 Vitals BP Pulse Temp Resp Height(growth percentile) Weight(growth percentile) 98/60 (BP 1 Location: Left arm, BP Patient Position: Sitting) 96 97.4 °F (36.3 °C) (Oral) 18 5' 4\" (1.626 m) 156 lb (70.8 kg) SpO2 BMI OB Status Smoking Status (!) 88% 26.78 kg/m2 Hysterectomy Former Smoker Vitals History BMI and BSA Data Body Mass Index Body Surface Area  
 26.78 kg/m 2 1.79 m 2 Preferred Pharmacy Pharmacy Name Phone [id number]- [address] [phone] Your Updated Medication List  
  
   
This list is accurate as of: 1/11/17  1:05 PM.  Always use your most recent med list.  
  
  
  
  
 ALPRAZolam 0.25 mg tablet Commonly known as:  XANAX  
1/2 tablet twice per day as needed for stress  
  
 doxepin 10 mg capsule Commonly known as:  SINEquan  
1 tablet at bedtime  
  
 ergocalciferol 50,000 unit capsule Commonly known as:  VITAMIN D2  
1 capsule daily  
  
 estradiol 1 mg tablet Commonly known as:  ESTRACE  
TAKE 1 TABLET BY MOUTH EVERY DAY  
  
 gabapentin 300 mg capsule Commonly known as:  NEURONTIN  
TAKE 2 CAPSULE BY MOUTH AT BEDTIME  
  
 hydroCHLOROthiazide 50 mg tablet Commonly known as:  HYDRODIURIL  
TAKE 1 TABLET BY MOUTH EVERY MORNING  
  
 inhalational spacing device Use with the inhaler NIFEdipine ER 30 mg ER tablet Commonly known as:  PROCARDIA XL  
TAKE 1 TABLET BY MOUTH DAILY predniSONE 10 mg tablet Commonly known as:  DELTASONE  
3 tabs daily x 3 days, 2 tabs daily x 3 days, 1 tab daily x 3 days, 1/2 tab daily x 3 days PROAIR HFA 90 mcg/actuation inhaler Generic drug:  albuterol INHALE 2 PUFFS BY MOUTH 3 TIMES A DAY AS NEEDED FOR CHEST CONGESTION  
  
 promethazine 25 mg tablet Commonly known as:  PHENERGAN Take 25 mg by mouth every eight (8) hours as needed for Nausea. * umeclidinium-vilanterol 62.5-25 mcg/actuation inhaler Commonly known as:  Ardelle Tari Take 1 Puff by inhalation daily. * umeclidinium-vilanterol 62.5-25 mcg/actuation inhaler Commonly known as:  Ardelle Tari Take 1 Puff by inhalation daily. * Notice: This list has 2 medication(s) that are the same as other medications prescribed for you. Read the directions carefully, and ask your doctor or other care provider to review them with you. Prescriptions Sent to Pharmacy Refills  
 umeclidinium-vilanterol (ANORO ELLIPTA) 62.5-25 mcg/actuation inhaler 5 Sig: Take 1 Puff by inhalation daily. Class: Normal  
 Pharmacy: [address] [place] [address] #: [phone] Route: Inhalation Follow-up Instructions Return in about 4 weeks (around 2/8/2017). To-Do List   
 01/11/2017 Lab:  CHANTAL QL, W/REFLEX CASCADE   
  
 01/11/2017 Lab:  ANCA PANEL   
  
 01/11/2017 Lab:  ANGIOTENSIN CONVERTING ENZYME   
  
 01/11/2017 Lab:  C REACTIVE PROTEIN, QT   
  
 01/11/2017 Lab:  CK   
  
 01/11/2017 Lab:  HIV 1/2 AG/AB, 4TH GENERATION,W RFLX CONFIRM   
  
 01/11/2017 Lab:  RHEUMATOID FACTOR, QL   
  
 01/11/2017 Lab:  SCLERODERMA (SCL-70) AB, IGG   
  
 01/11/2017 Lab:  SED RATE (ESR)   
  
 01/11/2017 Lab:  SJOGREN'S ABS, SSA AND SSB   
  
 01/12/2017 To Be Determined Appointment with Bethel Noguera RN at [address] [facility]  
  
 01/12/2017 Procedures: AMB POC PULSE OXIMETRY, CONTINUOUS   
  
 01/13/2017 To Be Determined Appointment with Christine Cortes CNA at [address] [facility]  
  
 01/13/2017 To Be Determined Appointment with Love Serrano PTA at [address] [facility]  
  
 01/17/2017 To Be Determined Appointment with Kenneth Fernandes PT at [address] [facility]  
  
 01/17/2017 To Be Determined Appointment with Annette Soto RN at [address] [facility]  
  
 01/17/2017 To Be Determined Appointment with Jane Frost CNA at [address] [facility]  
  
 01/20/2017 To Be Determined Appointment with Jane Frost CNA at [address] [facility]  
  
 01/24/2017 To Be Determined Appointment with Annette Soto RN at [address] [place] [address] Please provide this summary of care documentation to your next provider. Your primary care clinician is listed as Antonette Hazel. If you have any questions after today's visit, please call [phone].

## 2017-01-12 PROBLEM — R09.02 HYPOXIA: Status: ACTIVE | Noted: 2017-01-01

## 2017-01-12 NOTE — TELEPHONE ENCOUNTER
[facility] nurse called to see if O2 has been ordered. Patient was told on OV today it was going to be ordered.   Reji [phone]

## 2017-01-12 NOTE — PATIENT INSTRUCTIONS
Health Maintenance Due   Topic Date Due    DTaP/Tdap/Td  (1 - Tdap) 11/15/1954    Shingles Vaccine  11/15/1993    Glaucoma Screening   11/15/1998    Bone Density Screening  11/15/1998    Pneumococcal Vaccine (1 of 2 - PCV13) 11/15/1998    Annual Well Visit  11/15/1998

## 2017-01-12 NOTE — PROGRESS NOTES
The patient presents to the office today with the chief complaint of myalgias    HPI    The patient complaisn of aching \"all over\". She complains of reoccurrence of weakness in her legs. The problem has worsened after the Prednisone has finished. The pateint also notes worsening dyspnea. The pulse ox as 86% at home when checked by PT. There pulse ox was 88% yesterday but with nail polish present. Review of Systems   Respiratory: Positive for shortness of breath. Negative for cough. Cardiovascular: Negative for chest pain, palpitations and leg swelling. Musculoskeletal: Positive for myalgias. Allergies   Allergen Reactions    Zithromax [Azithromycin] Nausea Only    Sulfa (Sulfonamide Antibiotics) Itching    Penicillins Other (comments)     Yeast infection       Current Outpatient Prescriptions   Medication Sig Dispense Refill    predniSONE (DELTASONE) 5 mg tablet 3 tablets daily for one week. 2 1/2 tablets daily for one weekly, then 2 tablets daily 100 Tab 1    metOLazone (ZAROXOLYN) 2.5 mg tablet 1 tablet every other day (stop HCTZ) 20 Tab 1    umeclidinium-vilanterol (ANORO ELLIPTA) 62.5-25 mcg/actuation inhaler Take 1 Puff by inhalation daily. 1 Inhaler 0    inhalational spacing device Use with the inhaler 1 Device 0    ergocalciferol (VITAMIN D2) 50,000 unit capsule 1 capsule daily 12 Cap 3    promethazine (PHENERGAN) 25 mg tablet Take 25 mg by mouth every eight (8) hours as needed for Nausea.       PROAIR HFA 90 mcg/actuation inhaler INHALE 2 PUFFS BY MOUTH 3 TIMES A DAY AS NEEDED FOR CHEST CONGESTION 8.5 Inhaler 0    doxepin (SINEQUAN) 10 mg capsule 1 tablet at bedtime 90 Cap 3    ALPRAZolam (XANAX) 0.25 mg tablet 1/2 tablet twice per day as needed for stress 30 Tab 1    estradiol (ESTRACE) 1 mg tablet TAKE 1 TABLET BY MOUTH EVERY DAY 90 Tab 0    gabapentin (NEURONTIN) 300 mg capsule TAKE 2 CAPSULE BY MOUTH AT BEDTIME 180 Cap 3    NIFEdipine ER (PROCARDIA XL) 30 mg ER tablet TAKE 1 TABLET BY MOUTH DAILY 90 Tab 3       Past Medical History   Diagnosis Date    Acute bronchitis     Acute upper respiratory infections of unspecified site     Essential hypertension, benign     Fibular collateral ligament bursitis     Insomnia, unspecified     Myalgia and myositis, unspecified     Pure hypercholesterolemia     Raynaud's syndrome        Past Surgical History   Procedure Laterality Date    Hx hysterectomy      Hx appendectomy      Hx cholecystectomy      Hx bladder suspension      Hx other surgical Right      foot surgery       Social History     Social History    Marital status:      Spouse name: N/A    Number of children: N/A    Years of education: N/A     Occupational History    Not on file. Social History Main Topics    Smoking status: Former Smoker     Packs/day: 1.00     Years: 20.00     Quit date: 1/1/1992    Smokeless tobacco: Never Used    Alcohol use Yes      Comment: occas    Drug use: No    Sexual activity: No     Other Topics Concern    Not on file     Social History Narrative       Patient does not have an advanced directive on file    Visit Vitals    BP 96/60 (BP 1 Location: Left arm, BP Patient Position: Sitting)    Pulse (!) 102    Temp 97.3 °F (36.3 °C) (Tympanic)    Resp 20    Ht 5' 4\" (1.626 m)    Wt 155 lb (70.3 kg)    SpO2 (!) 83%    BMI 26.61 kg/m2       Physical Exam   Neck: Carotid bruit is not present. Cardiovascular: Regular rhythm. Exam reveals no gallop. No murmur heard. Pulmonary/Chest: She has no wheezes. She has no rales. Abdominal: Soft. She exhibits no distension. There is no tenderness. Musculoskeletal: She exhibits no edema.        Hospital Outpatient Visit on 01/11/2017   Component Date Value Ref Range Status    Angiotensin Converting Enzyme (ACE) 01/11/2017 19  14 - 82 U/L Final    Comment: (NOTE)  Performed At: [address] [id number]  Karen Chan MD PJ:[id number]      CHANTAL Direct 2017 Positive* NEGATIVE   Final    Comment: (NOTE)  Performed At: [facility]  [address] [place] [id number]  Irenesantiago Zambranoshamar CABRAL DY:[id number]      See below 2017 Comment    Final    Comment: (NOTE)  Autoantibody                       Disease Association  ____________________________________________________________                         Condition                  Frequency  _____________________   ________________________   _________  Antinuclear Antibody,    SLE, mixed connective  Direct (CHANTAL-D)           tissue diseases  _____________________   ________________________   _________  dsDNA                    SLE                        40 - 60%  _____________________   ________________________   _________  Chromatin                Drug induced SLE                90%                          SLE                        48 - 97%  _____________________   ________________________   _________  [facility]                 SLE                        25 - 35%                          Sjogren's Syndrome         40 - 70%                           Lupus                 100%  _____________________   ________________________   _________  SSB ([place])                 SLE                                                        10%                          Sjogren's Syndrome              30%  _____________________   _______________________    _________  Sm (anti-Smith)          SLE                        15 - 30%  _____________________   _______________________    _________  RNP                      Mixed Connective Tissue                          Disease                         95%  (U1 nRNP,                SLE                        30 - 50%  anti-ribonucleoprotein)  Polymyositis and/or                          Dermatomyositis                 20%  _____________________   ________________________   _________  Scl-70 (antiDNA          Scleroderma (diffuse)      20 - 35%  topoisomerase)           Crest                           13%  _____________________   ________________________   _________  Mary-1                     Polymyositis and/or                          Dermatomyositis            20 - 40%  _____________________   ________________________   _________  Rhonda Ill B             Scleroderma -                            Crest                          variant                         80%  _____________________   ________________________   _________  Ribosomal P              SLE                        10 - 20%  Performed At: [facility]  [employer] [id number]  Marcelo Echevarria MD OU:[id number]      CK 01/11/2017 23* 26 - 192 U/L Final    C-Reactive protein 01/11/2017 13.3* 0 - 0.3 mg/dL Final    Sed rate, automated 01/11/2017 25  0 - 30 mm/hr Final    HIV Screen, 4th gen 01/11/2017 Non Reactive  Non Reactive Final    Comment: (NOTE)  Performed At: [facility]  [employer] [address] [id number]  Marcelo Echevarria MD IH:[id number]      Myeloperoxidase (MPO) Ab 01/11/2017 <9.0  0.0 - 9.0 U/mL Final    Proteinase 3 (ME-3) Ab 01/11/2017 <3.5  0.0 - 3.5 U/mL Final    Comment: (NOTE)  Performed At: [facility]  [employer] [address] [id number]  Marcelo Echevarria MD TU:[id number]      Cytoplasmic (C-ANCA) Ab 01/11/2017 PENDING  titer Incomplete    Perinuclear (P-ANCA) 01/11/2017 PENDING  titer Incomplete    Atypical pANCA 01/11/2017 PENDING  titer Incomplete    RA Latex, Ql. 01/11/2017 POSITIVE* NEG   Final    Scleroderma-70 Ab 01/11/2017 <0.2  0.0 - 0.9 AI Final    Comment: (NOTE)  Performed At: [address] [id number]  Marcelo Echevarria MD FT:[id number]      Sjogren's Anti-SS-A 01/11/2017 <0.2  0.0 - 0.9 AI Final    Sjogren's Anti-SS-B 01/11/2017 <0.2  0.0 - 0.9 AI Final    Comment: (NOTE)  Performed At: [address] [id number]  Olinda Lyon MD CZ:[id number]      RA, Qt. 2017 POSITIVE 1:2* NEG   Final    dsDNA Ab, Qt. 2017 <1  0 - 9 IU/mL Final    Comment: (NOTE)                                    Negative      <5                                    Equivocal  5 - 9                                    Positive      >9  Performed At: [address] [id number]  Olinda Lyon MD TQ:[id number]      Tony/RNP Abs 2017 <0.2  0.0 - 0.9 AI Final    Comment: (NOTE)  Performed At: [address] [id number]  Olinda Lyon MD NC:[id number]      Antichromatin Abs 2017 0.7  0.0 - 0.9 AI Final    Comment: (NOTE)  Performed At: [address] [id number]  Olinda Lyon MD TB:[id number]      RNP Abs 2017 <0.2  0.0 - 0.9 AI Final    Comment: (NOTE)  Performed At: [address] [id number]  Olinda Lyon MD MT:[id number]     Mertie Actis Abs 2017 <0.2  0.0 - 0.9 AI Final    Comment: (NOTE)  Performed At: [address] [id number]  Olinda Lyon MD EV:[id number]      Sjogren's-SSA 2017 <0.2  0.0 - 0.9 AI Final    Comment: (NOTE)  Performed At: [address] [id number]  Olinda Lyon MD JK:[id number]      Sjogren's-SSB 2017 <0.2  0.0 - 0.9 AI Final    Comment: (NOTE)  Performed At: [facility]  [facility] [address], [place] [id number]  Olinda Lyon MD BP:[id number]      Scleroderma Ab 2017 <0.2  0.0 - 0.9 AI Final    Comment: (NOTE)  Performed At: [address] [id number]  Olinda Lyon MD B      Mary-1 Ab 2017 <0.2  0.0 - 0.9 AI Final    Comment: (NOTE)  Performed At: [address] [id number]  Olinda Lyon MD SM:[id number]  Anti-Ribosomal P A 01/11/2017 <0.2  0.0 - 0.9 AI Final    Comment: (NOTE)  Performed At: [facility]  [facility] [address] [place] [id number]  Jeanette De Luna MD FL:[id number]      Anti-Centromere B 01/11/2017 >8.0* 0.0 - 0.9 AI Final    Comment: (NOTE)  Performed At: [facility]  [facility] [address] [place] [id number]  [address] KX:[id number]     Hospital Outpatient Visit on 12/27/2016   Component Date Value Ref Range Status    C-Reactive protein 12/27/2016 4.5* 0 - 0.3 mg/dL Final    CK 12/27/2016 35  26 - 192 U/L Final    Sed rate, automated 12/27/2016 11  0 - 30 mm/hr Final   Admission on 12/21/2016, Discharged on 12/24/2016   Component Date Value Ref Range Status    Ventricular Rate 12/21/2016 86  BPM Final    Atrial Rate 12/21/2016 86  BPM Final    P-R Interval 12/21/2016 158  ms Final    QRS Duration 12/21/2016 74  ms Final    Q-T Interval 12/21/2016 438  ms Final    QTC Calculation (Bezet) 12/21/2016 524  ms Final    Calculated P Axis 12/21/2016 57  degrees Final    Calculated R Axis 12/21/2016 108  degrees Final    Calculated T Axis 12/21/2016 -37  degrees Final    Diagnosis 12/21/2016    Final                    Value:Normal sinus rhythm with sinus arrhythmia  Rightward axis  Nonspecific ST and T wave abnormality  Prolonged QT  Abnormal ECG  When compared with ECG of 09-JUL-2013 07:44,  T wave inversion now evident in Inferior leads  Nonspecific T wave abnormality now evident in Anterolateral leads  QT has lengthened  Confirmed by Natividad Santana ([id number]) on 12/22/2016 10:10:17 AM      WBC 12/21/2016 13.2  4.6 - 13.2 K/uL Final    RBC 12/21/2016 4.47  4.20 - 5.30 M/uL Final    HGB 12/21/2016 12.6  12.0 - 16.0 g/dL Final    HCT 12/21/2016 38.1  35.0 - 45.0 % Final    MCV 12/21/2016 85.2  74.0 - 97.0 FL Final    MCH 12/21/2016 28.2  24.0 - 34.0 PG Final    MCHC 12/21/2016 33.1  31.0 - 37.0 g/dL Final    RDW 12/21/2016 17.6* 11.6 - 14.5 % Final    PLATELET 79/23/8786 500  135 - 420 K/uL Final    MPV 12/21/2016 10.0  9.2 - 11.8 FL Final    NEUTROPHILS 12/21/2016 90* 40 - 73 % Final    LYMPHOCYTES 12/21/2016 5* 21 - 52 % Final    MONOCYTES 12/21/2016 5  3 - 10 % Final    EOSINOPHILS 12/21/2016 0  0 - 5 % Final    BASOPHILS 12/21/2016 0  0 - 2 % Final    ABS. NEUTROPHILS 12/21/2016 12.0* 1.8 - 8.0 K/UL Final    ABS. LYMPHOCYTES 12/21/2016 0.7* 0.9 - 3.6 K/UL Final    ABS. MONOCYTES 12/21/2016 0.6  0.05 - 1.2 K/UL Final    ABS. EOSINOPHILS 12/21/2016 0.0  0.0 - 0.4 K/UL Final    ABS. BASOPHILS 12/21/2016 0.0  0.0 - 0.1 K/UL Final    DF 12/21/2016 AUTOMATED    Final    Sodium 12/21/2016 138  136 - 145 mmol/L Final    Potassium 12/21/2016 3.6  3.5 - 5.5 mmol/L Final    Chloride 12/21/2016 101  100 - 108 mmol/L Final    CO2 12/21/2016 27  21 - 32 mmol/L Final    Anion gap 12/21/2016 10  3.0 - 18 mmol/L Final    Glucose 12/21/2016 143* 74 - 99 mg/dL Final    BUN 12/21/2016 24* 7.0 - 18 MG/DL Final    Creatinine 12/21/2016 0.95  0.6 - 1.3 MG/DL Final    BUN/Creatinine ratio 12/21/2016 25* 12 - 20   Final    GFR est AA 12/21/2016 >60  >60 ml/min/1.73m2 Final    GFR est non-AA 12/21/2016 56* >60 ml/min/1.73m2 Final    Comment: (NOTE)  Estimated GFR is calculated using the Modification of Diet in Renal   Disease (MDRD) Study equation, reported for both  Americans   (GFRAA) and non- Americans (GFRNA), and normalized to 1.73m2   body surface area. The physician must decide which value applies to   the patient. The MDRD study equation should only be used in   individuals age 25 or older. It has not been validated for the   following: pregnant women, patients with serious comorbid conditions,   or on certain medications, or persons with extremes of body size,   muscle mass, or nutritional status.       Calcium 12/21/2016 8.2* 8.5 - 10.1 MG/DL Final    Bilirubin, total 12/21/2016 0.7  0.2 - 1.0 MG/DL Final    ALT 12/21/2016 42  13 - 56 U/L Final    AST 12/21/2016 42* 15 - 37 U/L Final    Alk. phosphatase 12/21/2016 38* 45 - 117 U/L Final    Protein, total 12/21/2016 6.1* 6.4 - 8.2 g/dL Final    Albumin 12/21/2016 3.0* 3.4 - 5.0 g/dL Final    Globulin 12/21/2016 3.1  2.0 - 4.0 g/dL Final    A-G Ratio 12/21/2016 1.0  0.8 - 1.7   Final    Magnesium 12/21/2016 1.5* 1.8 - 2.4 mg/dL Final    Prothrombin time 12/21/2016 13.9  11.5 - 15.2 sec Final    INR 12/21/2016 1.1  0.8 - 1.2   Final    Comment:            INR Therapeutic Ranges         (on stable oral anticoagulant):     INDICATION                INR  DVT/PE/Atrial Fib          2.0-3.0  MI/Mechanical Heart Valve  2.5-3.5      aPTT 12/21/2016 29.1  23.0 - 36.4 SEC Final    CK 12/21/2016 103  26 - 192 U/L Final    CK - MB 12/21/2016 2.5  0.5 - 3.6 ng/ml Final    CK-MB Index 12/21/2016 2.4  0.0 - 4.0 % Final    Troponin-I, Qt. 12/21/2016 0.03  0.0 - 0.045 NG/ML Final    Comment: The presence of detectable troponin above the reference range indicates myocardial injury which may be due to ischemia, myocarditis, trauma, etc.  Clinical correlation is necessary to establish the significance of this finding. Sequential testing is recommended to determine if the typical rise and fall of cTnI is demonstrated. Note:  Cardiac troponin I has a relatively long half life and may be present well after the CK MB has returned to baseline. The reference range is based on the 99th percentile of the referent population.       Color 12/21/2016 YELLOW    Final    Appearance 12/21/2016 CLEAR    Final    Specific gravity 12/21/2016 1.011  1.005 - 1.030   Final    pH (UA) 12/21/2016 5.5  5.0 - 8.0   Final    Protein 12/21/2016 30* NEG mg/dL Final    Glucose 12/21/2016 NEGATIVE   NEG mg/dL Final    Ketone 12/21/2016 NEGATIVE   NEG mg/dL Final    Bilirubin 12/21/2016 NEGATIVE   NEG   Final    Blood 12/21/2016 NEGATIVE   NEG   Final    Urobilinogen 12/21/2016 1.0  0.2 - 1.0 EU/dL Final    Nitrites 12/21/2016 NEGATIVE   NEG   Final    Leukocyte Esterase 12/21/2016 TRACE* NEG   Final    CK 12/21/2016 97  26 - 192 U/L Final    CK - MB 12/21/2016 2.7  0.5 - 3.6 ng/ml Final    CK-MB Index 12/21/2016 2.8  0.0 - 4.0 % Final    Troponin-I, Qt. 12/21/2016 0.03  0.0 - 0.045 NG/ML Final    Comment: The presence of detectable troponin above the reference range indicates myocardial injury which may be due to ischemia, myocarditis, trauma, etc.  Clinical correlation is necessary to establish the significance of this finding. Sequential testing is recommended to determine if the typical rise and fall of cTnI is demonstrated. Note:  Cardiac troponin I has a relatively long half life and may be present well after the CK MB has returned to baseline. The reference range is based on the 99th percentile of the referent population.  WBC 12/21/2016 2 to 4  0 - 4 /hpf Final    RBC 12/21/2016 NONE  0 - 5 /hpf Final    Epithelial cells 12/21/2016 3+  0 - 5 /lpf Final    Bacteria 12/21/2016 FEW* NEG /hpf Final    CK 12/21/2016 99  26 - 192 U/L Final    CK - MB 12/21/2016 3.8* 0.5 - 3.6 ng/ml Final    CK-MB Index 12/21/2016 3.8  0.0 - 4.0 % Final    Troponin-I, Qt. 12/21/2016 0.04  0.0 - 0.045 NG/ML Final    Comment: The presence of detectable troponin above the reference range indicates myocardial injury which may be due to ischemia, myocarditis, trauma, etc.  Clinical correlation is necessary to establish the significance of this finding. Sequential testing is recommended to determine if the typical rise and fall of cTnI is demonstrated. Note:  Cardiac troponin I has a relatively long half life and may be present well after the CK MB has returned to baseline. The reference range is based on the 99th percentile of the referent population.       CK 12/22/2016 89  26 - 192 U/L Final    CK - MB 12/22/2016 3.0  0.5 - 3.6 ng/ml Final    CK-MB Index 12/22/2016 3.4  0.0 - 4.0 % Final    Troponin-I, Qt. 12/22/2016 0.05* 0.0 - 0.045 NG/ML Final    Comment: The presence of detectable troponin above the reference range indicates myocardial injury which may be due to ischemia, myocarditis, trauma, etc.  Clinical correlation is necessary to establish the significance of this finding. Sequential testing is recommended to determine if the typical rise and fall of cTnI is demonstrated. Note:  Cardiac troponin I has a relatively long half life and may be present well after the CK MB has returned to baseline. The reference range is based on the 99th percentile of the referent population.       Hemoglobin A1c 12/21/2016 6.1* 4.2 - 5.6 % Final    Comment: (NOTE)  HbA1C Interpretive Ranges  <5.7              Normal  5.7 - 6.4         Consider Prediabetes  >6.5              Consider Diabetes      LIPID PROFILE 12/21/2016        Final    Cholesterol, total 12/21/2016 181  <200 MG/DL Final    Triglyceride 12/21/2016 94  <150 MG/DL Final    HDL Cholesterol 12/21/2016 34* 40 - 60 MG/DL Final    LDL, calculated 12/21/2016 128.2* 0 - 100 MG/DL Final    VLDL, calculated 12/21/2016 18.8  MG/DL Final    CHOL/HDL Ratio 12/21/2016 5.3* 0 - 5.0   Final    TSH 12/21/2016 0.74  0.36 - 3.74 uIU/mL Final    Ventricular Rate 12/22/2016 90  BPM Final    Atrial Rate 12/22/2016 90  BPM Final    P-R Interval 12/22/2016 142  ms Final    QRS Duration 12/22/2016 82  ms Final    Q-T Interval 12/22/2016 388  ms Final    QTC Calculation (Bezet) 12/22/2016 474  ms Final    Calculated P Axis 12/22/2016 49  degrees Final    Calculated R Axis 12/22/2016 110  degrees Final    Calculated T Axis 12/22/2016 15  degrees Final    Diagnosis 12/22/2016    Final                    Value:Normal sinus rhythm  Rightward axis  Abnormal ECG  When compared with ECG of 21-DEC-2016 18:56,  Nonspecific T wave abnormality no longer evident in Anterolateral leads  Confirmed by Shana Rosa ([id number]) on 12/22/2016 10:02:45 AM      Magnesium 12/22/2016 1.7* 1.8 - 2.4 mg/dL Final    Sodium 12/22/2016 137  136 - 145 mmol/L Final    Potassium 12/22/2016 3.4* 3.5 - 5.5 mmol/L Final    Chloride 12/22/2016 104  100 - 108 mmol/L Final    CO2 12/22/2016 22  21 - 32 mmol/L Final    Anion gap 12/22/2016 11  3.0 - 18 mmol/L Final    Glucose 12/22/2016 130* 74 - 99 mg/dL Final    BUN 12/22/2016 26* 7.0 - 18 MG/DL Final    Creatinine 12/22/2016 0.96  0.6 - 1.3 MG/DL Final    BUN/Creatinine ratio 12/22/2016 27* 12 - 20   Final    GFR est AA 12/22/2016 >60  >60 ml/min/1.73m2 Final    GFR est non-AA 12/22/2016 56* >60 ml/min/1.73m2 Final    Calcium 12/22/2016 8.6  8.5 - 10.1 MG/DL Final   Hospital Outpatient Visit on 10/19/2016   Component Date Value Ref Range Status    WBC 10/19/2016 5.9  4.6 - 13.2 K/uL Final    RBC 10/19/2016 4.69  4.20 - 5.30 M/uL Final    HGB 10/19/2016 13.1  12.0 - 16.0 g/dL Final    HCT 10/19/2016 41.9  35.0 - 45.0 % Final    MCV 10/19/2016 89.3  74.0 - 97.0 FL Final    MCH 10/19/2016 27.9  24.0 - 34.0 PG Final    MCHC 10/19/2016 31.3  31.0 - 37.0 g/dL Final    RDW 10/19/2016 18.5* 11.6 - 14.5 % Final    PLATELET 73/01/1654 384  135 - 420 K/uL Final    MPV 10/19/2016 9.9  9.2 - 11.8 FL Final    NEUTROPHILS 10/19/2016 71  40 - 73 % Final    LYMPHOCYTES 10/19/2016 16* 21 - 52 % Final    MONOCYTES 10/19/2016 9  3 - 10 % Final    EOSINOPHILS 10/19/2016 3  0 - 5 % Final    BASOPHILS 10/19/2016 1  0 - 2 % Final    ABS. NEUTROPHILS 10/19/2016 4.2  1.8 - 8.0 K/UL Final    ABS. LYMPHOCYTES 10/19/2016 0.9  0.9 - 3.6 K/UL Final    ABS. MONOCYTES 10/19/2016 0.5  0.05 - 1.2 K/UL Final    ABS. EOSINOPHILS 10/19/2016 0.2  0.0 - 0.4 K/UL Final    ABS.  BASOPHILS 10/19/2016 0.0  0.0 - 0.06 K/UL Final    DF 10/19/2016 AUTOMATED    Final    Sodium 10/19/2016 141  136 - 145 mmol/L Final    Potassium 10/19/2016 4.1  3.5 - 5.5 mmol/L Final    Chloride 10/19/2016 104  100 - 108 mmol/L Final    CO2 10/19/2016 28  21 - 32 mmol/L Final    Anion gap 10/19/2016 9  3.0 - 18 mmol/L Final    Glucose 10/19/2016 85  74 - 99 mg/dL Final    BUN 10/19/2016 21* 7.0 - 18 MG/DL Final    Creatinine 10/19/2016 0.92  0.6 - 1.3 MG/DL Final    BUN/Creatinine ratio 10/19/2016 23* 12 - 20   Final    GFR est AA 10/19/2016 >60  >60 ml/min/1.73m2 Final    GFR est non-AA 10/19/2016 58* >60 ml/min/1.73m2 Final    Comment: (NOTE)  Estimated GFR is calculated using the Modification of Diet in Renal   Disease (MDRD) Study equation, reported for both  Americans   (GFRAA) and non- Americans (GFRNA), and normalized to 1.73m2   body surface area. The physician must decide which value applies to   the patient. The MDRD study equation should only be used in   individuals age 25 or older. It has not been validated for the   following: pregnant women, patients with serious comorbid conditions,   or on certain medications, or persons with extremes of body size,   muscle mass, or nutritional status.  Calcium 10/19/2016 8.8  8.5 - 10.1 MG/DL Final    Bilirubin, total 10/19/2016 0.4  0.2 - 1.0 MG/DL Final    ALT 10/19/2016 20  13 - 56 U/L Final    AST 10/19/2016 16  15 - 37 U/L Final    Alk. phosphatase 10/19/2016 33* 45 - 117 U/L Final    Protein, total 10/19/2016 6.4  6.4 - 8.2 g/dL Final    Albumin 10/19/2016 3.2* 3.4 - 5.0 g/dL Final    Globulin 10/19/2016 3.2  2.0 - 4.0 g/dL Final    A-G Ratio 10/19/2016 1.0  0.8 - 1.7   Final    Cholesterol, total 10/19/2016 212* <200 MG/DL Final    Calcitriol (Vit D 1, 25 di-OH) 10/19/2016 41.7  19.9 - 79.3 pg/mL Final    Comment: (NOTE)  Performed At: [address] [id number]  Macrina Gonsalves MD SI:[id number]       Assessment / Plan      ICD-10-CM ICD-9-CM    1. Bilateral leg edema Q73.0 016.0 METABOLIC PANEL, BASIC   2. Essential hypertension, benign I10 401.1    3. Myalgia M79.1 729.1    4.  Hypoxia R09.02 799.02      Prednisone and follow symptoms  Home O2 - 2L nasal prongs  she was advised to continue her maintenance medications      Follow-up Disposition:  Return in about 4 weeks (around 2/9/2017). I asked Sayra Felix if she has any questions and I answered the questions. Sayra Felix states that she understands the treatment plan and agrees with the treatment plan

## 2017-01-12 NOTE — PROGRESS NOTES
1. Have you been to the ER, urgent care clinic since your last visit? Hospitalized since your last visit? No    2. Have you seen or consulted any other health care providers outside of the [address] [place] [address] since your last visit? Include any pap smears or colon screening.  No

## 2017-01-12 NOTE — MR AVS SNAPSHOT
Visit Information Date & Time Provider Department Dept. Phone Encounter #  
 1/12/2017  9:30 AM Cori Fonseca MD [facility] OF Andreina Alexandre [phone] [id number] Your Appointments 2/1/2017  8:45 AM  
Follow Up with Cori Fonseca MD  
[address]) Appt Note: 1mo  
 [address] [facility], [address] [facility] [address]  
  
   
 [address] [facility], [address] [facility] [address]  
  
    
 2/8/2017 11:15 AM  
ESTABLISHED PATIENT with Oumar Ortiz MD  
[facility]) Appt Note: h/f  
 [address] [facility], [address] [facility] [address]  
[address], [address] [address] 2/14/2017  8:00 AM  
Follow Up with Cori Fonseca MD  
[address]) Appt Note: 4 month [address] [facility], [address] [facility] [address]  
[phone] Upcoming Health Maintenance Date Due DTaP/Tdap/Td series (1 - Tdap) 11[birth year]15/1954 ZOSTER VACCINE AGE 60> 11/15/1993 GLAUCOMA SCREENING Q2Y 11/15/1998 OSTEOPOROSIS SCREENING (DEXA) 11/15/1998 Pneumococcal 65+ Low/Medium Risk (1 of 2 - PCV13) 11/15/1998 MEDICARE YEARLY EXAM 11/15/1998 Allergies as of 1/12/2017  Review Complete On: 1/12/2017 By: Cori Fonseca MD  
  
 Severity Noted Reaction Type Reactions Zithromax [Azithromycin] High 12/21/2016   Side Effect Nausea Only Sulfa (Sulfonamide Antibiotics) Medium   Itching Penicillins  12/22/2016    Other (comments) Yeast infection Current Immunizations  Never Reviewed Name Date Influenza High Dose Vaccine PF 10/19/2016  9:30 AM  
 Influenza Vaccine (Quad) 10/7/2015 Not reviewed this visit You Were Diagnosed With   
  
 Codes Comments Bilateral leg edema    -  Primary ICD-10-CM: R60.0 ICD-9-CM: 306. 3 Vitals BP Pulse Temp Resp Height(growth percentile) Weight(growth percentile) 96/60 (BP 1 Location: Left arm, BP Patient Position: Sitting) (!) 102 97.3 °F (36.3 °C) (Tympanic) 20 5' 4\" (1.626 m) 155 lb (70.3 kg) SpO2 BMI OB Status Smoking Status (!) 83% 26.61 kg/m2 Hysterectomy Former Smoker BMI and BSA Data Body Mass Index Body Surface Area  
 26.61 kg/m 2 1.78 m 2 Preferred Pharmacy Pharmacy Name Phone [id number]- [place] [phone] Your Updated Medication List  
  
   
This list is accurate as of: 1/12/17 11:23 AM.  Always use your most recent med list.  
  
  
  
  
 ALPRAZolam 0.25 mg tablet Commonly known as:  XANAX  
1/2 tablet twice per day as needed for stress  
  
 doxepin 10 mg capsule Commonly known as:  SINEquan  
1 tablet at bedtime  
  
 ergocalciferol 50,000 unit capsule Commonly known as:  VITAMIN D2  
1 capsule daily  
  
 estradiol 1 mg tablet Commonly known as:  ESTRACE  
TAKE 1 TABLET BY MOUTH EVERY DAY  
  
 gabapentin 300 mg capsule Commonly known as:  NEURONTIN  
TAKE 2 CAPSULE BY MOUTH AT BEDTIME  
  
 inhalational spacing device Use with the inhaler  
  
 metOLazone 2.5 mg tablet Commonly known as:  ZAROXOLYN  
1 tablet every other day (stop HCTZ) NIFEdipine ER 30 mg ER tablet Commonly known as:  PROCARDIA XL  
TAKE 1 TABLET BY MOUTH DAILY predniSONE 5 mg tablet Commonly known as:  DELTASONE  
3 tablets daily for one week. 2 1/2 tablets daily for one weekly, then 2 tablets daily PROAIR HFA 90 mcg/actuation inhaler Generic drug:  albuterol INHALE 2 PUFFS BY MOUTH 3 TIMES A DAY AS NEEDED FOR CHEST CONGESTION  
  
 promethazine 25 mg tablet Commonly known as:  PHENERGAN Take 25 mg by mouth every eight (8) hours as needed for Nausea. umeclidinium-vilanterol 62.5-25 mcg/actuation inhaler Commonly known as:  Isabel Jest Take 1 Puff by inhalation daily. Prescriptions Sent to Pharmacy Refills  
 predniSONE (DELTASONE) 5 mg tablet 1 Sig: 3 tablets daily for one week. 2 1/2 tablets daily for one weekly, then 2 tablets daily Class: Normal  
 Pharmacy: [facility]/pharmacy [address] [facility] [address] Ph #: [phone]  
 metOLazone (ZAROXOLYN) 2.5 mg tablet 1 Si tablet every other day (stop HCTZ) Class: Normal  
 Pharmacy: [address] [place] [address] Ph #: [phone] To-Do List   
 2017 To Be Determined Appointment with Yani Diaz CNA at [address] [facility]  
  
 2017 To Be Determined Appointment with Nicol Leong PT at [address] [facility]  
  
 2017 To Be Determined Appointment with Yani Diaz CNA at [address] [facility]  
  
 2017 To Be Determined Appointment with Yani Diaz CNA at [address] [facility]  
  
 2017 Lab:  METABOLIC PANEL, BASIC Patient Instructions Health Maintenance Due Topic Date Due  
 DTaP/Tdap/Td  (1 - Tdap) 11/15/1954  Shingles Vaccine  11/15/1993  Glaucoma Screening   11/15/1998  Bone Density Screening  11/15/1998  Pneumococcal Vaccine (1 of 2 - PCV13) 11[facility]/1998 [facility]  11/15/1998 Please provide this summary of care documentation to your next provider. Your primary care clinician is listed as Matias Or. If you have any questions after today's visit, please call [phone].

## 2017-01-13 NOTE — TELEPHONE ENCOUNTER
The Anoro 62.5 has 7 doses. The pt's daughter Fox Martin is informed that there are coupons on line and she could check with the Anoro Pt. Assistance programs to see if any would work with her [employer]. She requests a sample to give them time to accomplish that. Also, if need be, I asked her to call back for a prescription change in two weeks.

## 2017-01-13 NOTE — TELEPHONE ENCOUNTER
PT'S DAUGHTER CAME MI([phone]). PT WANTS TO KNOW IF THERE IS AN ALTERNATIVE FOR ANORO INHALER. IT IS TOO EXPENSIVE. ALSO WANTS TO KNOW HOW MANY PUFFS ON SAMPLE GIVEN. PLEASE CALL BACK.

## 2017-01-23 NOTE — PROGRESS NOTES
The patient presents to the office today with the chief complaint of dyspnea    HPI    The patient persists with dyspnea -- especially dyspnea on exertion. The patient has had improvement with Prednisone at moderate doses but the symptoms returned as the medication was tapered. The patient also persists with pain and stiffness in her legs. She has moderate fatigue      Review of Systems   Constitutional: Positive for malaise/fatigue. Respiratory: Positive for shortness of breath. Cardiovascular: Negative for chest pain and leg swelling. Musculoskeletal: Positive for joint pain. Allergies   Allergen Reactions    Zithromax [Azithromycin] Nausea Only    Sulfa (Sulfonamide Antibiotics) Itching    Penicillins Other (comments)     Yeast infection       Current Outpatient Prescriptions   Medication Sig Dispense Refill    doxepin (SINEQUAN) 10 mg capsule TAKE 1 TABLET BY MOUTH AT BEDTIME 90 Cap 3    estradiol (ESTRACE) 1 mg tablet TAKE 1 TABLET BY MOUTH EVERY DAY 90 Tab 0    umeclidinium-vilanterol (ANORO ELLIPTA) 62.5-25 mcg/actuation inhaler Take 1 Puff by inhalation daily. 2 Inhaler 0    predniSONE (DELTASONE) 5 mg tablet 3 tablets daily for one week. 2 1/2 tablets daily for one weekly, then 2 tablets daily 100 Tab 1    metOLazone (ZAROXOLYN) 2.5 mg tablet 1 tablet every other day (stop HCTZ) 20 Tab 1    umeclidinium-vilanterol (ANORO ELLIPTA) 62.5-25 mcg/actuation inhaler Take 1 Puff by inhalation daily. 1 Inhaler 0    inhalational spacing device Use with the inhaler 1 Device 0    ergocalciferol (VITAMIN D2) 50,000 unit capsule 1 capsule daily 12 Cap 3    promethazine (PHENERGAN) 25 mg tablet Take 25 mg by mouth every eight (8) hours as needed for Nausea.       PROAIR HFA 90 mcg/actuation inhaler INHALE 2 PUFFS BY MOUTH 3 TIMES A DAY AS NEEDED FOR CHEST CONGESTION 8.5 Inhaler 0    doxepin (SINEQUAN) 10 mg capsule 1 tablet at bedtime 90 Cap 3    ALPRAZolam (XANAX) 0.25 mg tablet 1/2 tablet twice per day as needed for stress 30 Tab 1    gabapentin (NEURONTIN) 300 mg capsule TAKE 2 CAPSULE BY MOUTH AT BEDTIME 180 Cap 3    NIFEdipine ER (PROCARDIA XL) 30 mg ER tablet TAKE 1 TABLET BY MOUTH DAILY 90 Tab 3       Past Medical History   Diagnosis Date    Acute bronchitis     Acute upper respiratory infections of unspecified site     Essential hypertension, benign     Fibular collateral ligament bursitis     Insomnia, unspecified     Myalgia and myositis, unspecified     Pure hypercholesterolemia     Raynaud's syndrome        Past Surgical History   Procedure Laterality Date    Hx hysterectomy      Hx appendectomy      Hx cholecystectomy      Hx bladder suspension      Hx other surgical Right      foot surgery       Social History     Social History    Marital status:      Spouse name: N/A    Number of children: N/A    Years of education: N/A     Occupational History    Not on file. Social History Main Topics    Smoking status: Former Smoker     Packs/day: 1.00     Years: 20.00     Quit date: 1/1/1992    Smokeless tobacco: Never Used    Alcohol use Yes      Comment: occas    Drug use: No    Sexual activity: No     Other Topics Concern    Not on file     Social History Narrative       Patient does not have an advanced directive on file    Visit Vitals    /80 (BP 1 Location: Left arm, BP Patient Position: Sitting)    Pulse 100    Resp 16    Ht 5' 4\" (1.626 m)       Physical Exam   Neck: Carotid bruit is not present. No thyromegaly present. Cardiovascular: Normal rate and regular rhythm. Exam reveals no gallop. No murmur heard. Pulmonary/Chest: She has no wheezes. She has no rales. Abdominal: Soft. She exhibits no distension. There is no tenderness. Musculoskeletal: She exhibits no edema. Lymphadenopathy:     She has no cervical adenopathy.        Hospital Outpatient Visit on 01/11/2017   Component Date Value Ref Range Status    Angiotensin Converting Enzyme (ACE) 2017 19  14 - 82 U/L Final    Comment: (NOTE)  Performed At: [facility]  [address] [place] [id number]  Angel Araujo MD AK:[id number]      CHANTAL Direct 2017 Positive* NEGATIVE   Final    Comment: (NOTE)  Performed At: [facility]  [address] [place] [id number]  Angel Araujo MD VQ:[id number]      See below 2017 Comment    Final    Comment: (NOTE)  Autoantibody                       Disease Association  ____________________________________________________________                         Condition                  Frequency  _____________________   ________________________   _________  Antinuclear Antibody,    SLE, mixed connective  Direct (CHANTAL-D)           tissue diseases  _____________________   ________________________   _________  dsDNA                    SLE                        40 - 60%  _____________________   ________________________   _________  Chromatin                Drug induced SLE                90%                          SLE                        48 - 97%  _____________________   ________________________   _________  Welford Look (Ro)                 SLE                        25 - 35%                          Sjogren's Syndrome         40 - 70%                           Lupus                 100%  _____________________   ________________________   _________  SSB (La)                 SLE                                                        10%                          Sjogren's Syndrome              30%  _____________________   _______________________    _________  Sm (anti-Smith)          SLE                        15 - 30%  _____________________   _______________________    _________  RNP                      Mixed Connective Tissue                          Disease                         95%  (U1 nRNP,                SLE                        30 - 50%  anti-ribonucleoprotein)  Polymyositis and/or Dermatomyositis                 20%  _____________________   ________________________   _________  Scl-70 (antiDNA          Scleroderma (diffuse)      20 - 35%  topoisomerase)           Crest                           13%  _____________________   ________________________   _________  Mary-1                     Polymyositis and/or                          Dermatomyositis            20 - 40%  _____________________   ________________________   _________  Candis Camper B             Scleroderma -                            Crest                          variant                         80%  _____________________   ________________________   _________  Ribosomal P              SLE                        10 - 20%  Performed At: [address] [id number]  Tristan Singleton MD TK:[id number]      CK 01/11/2017 23* 26 - 192 U/L Final    C-Reactive protein 01/11/2017 13.3* 0 - 0.3 mg/dL Final    Sed rate, automated 01/11/2017 25  0 - 30 mm/hr Final    HIV Screen, 4th gen 01/11/2017 Non Reactive  Non Reactive Final    Comment: (NOTE)  Performed At: [facility]  [employer] [address] [place] [id number]  Tristan Singleton MD NZ:[id number]      Myeloperoxidase (MPO) Ab 01/11/2017 <9.0  0.0 - 9.0 U/mL Final    Proteinase 3 (OK-3) Ab 01/11/2017 <3.5  0.0 - 3.5 U/mL Final    Comment: (NOTE)  [facility] [address] [id number]  Tristan Singleton MD NN:[id number]      Cytoplasmic (C-ANCA) Ab 01/11/2017 <1:20  Neg:<1:20 titer Final    Perinuclear (P-ANCA) 01/11/2017 <1:20  Neg:<1:20 titer Final    Comment: (NOTE)  The presence of positive fluorescence exhibiting P-ANCA or C-ANCA  patterns alone is not specific for the diagnosis of Wegener's  Granulomatosis (WG) or microscopic polyangiitis. Decisions about  treatment should not be based solely on ANCA IFA results.   The  International ANCA Group Consensus recommends follow up testing of  positive sera with both NY-3 and MPO-ANCA enzyme immunoassays. As  many as 5% serum samples are positive only by EIA. Ref. AM J Clin Pathol 1999;111:507-513.  Atypical pANCA 01/11/2017 <1:20  Neg:<1:20 titer Final    Comment: (NOTE)  The atypical pANCA pattern has been observed in a significant  percentage of patients with ulcerative colitis, primary sclerosing  cholangitis and autoimmune hepatitis.   Performed At: [facility]  [place] [address] [id number]  Nancy Chester MD PI:[id number]      RA Latex, Ql. 01/11/2017 POSITIVE* NEG   Final    Scleroderma-70 Ab 01/11/2017 <0.2  0.0 - 0.9 AI Final    Comment: (NOTE)  Performed At: [address] [id number]  Nancy Chester MD GQ:[id number]      Sjogren's Anti-SS-A 01/11/2017 <0.2  0.0 - 0.9 AI Final    Sjogren's Anti-SS-B 01/11/2017 <0.2  0.0 - 0.9 AI Final    Comment: (NOTE)  Performed At: [address] [id number]  Nancy Chester MD NT:[id number]      RA, Qt. 01/11/2017 POSITIVE 1:2* NEG   Final    dsDNA Ab, Qt. 01/11/2017 <1  0 - 9 IU/mL Final    Comment: (NOTE)                                    Negative      <5                                    Equivocal  5 - 9                                    Positive      >9  Performed At: [facility]  [place] [address] [id number]  Nancy Chester MD OL:[id number]      Tony/RNP Abs 01/11/2017 <0.2  0.0 - 0.9 AI Final    Comment: (NOTE)  Performed At: [address] [id number]  Nancy Chester MD HK:[id number]      Antichromatin Abs 01/11/2017 0.7  0.0 - 0.9 AI Final    Comment: (NOTE)  [facility]formed [facility]: [facility] [address] [id number]  Nancy Chester MD GK:[id number]      RNP Abs 01/11/2017 <0.2  0.0 - 0.9 AI Final    Comment: (NOTE)  Performed At: [address] [id number]  Lee Bradshaw MD DQ:[id number]     Riaz Jansen Abs 01/11/2017 <0.2  0.0 - 0.9 AI Final    Comment: (NOTE)  Performed At: [address] [id number]  Lee Bradshaw MD NT:[id number]      Sjogren's-SSA 01/11/2017 <0.2  0.0 - 0.9 AI Final    Comment: (NOTE)  Performed At: [address] [id number]  Lee Bradshaw MD TU:[id number]      Sjogren's-SSB 01/11/2017 <0.2  0.0 - 0.9 AI Final    Comment: (NOTE)  Performed At: [address] [id number]  Lee Bradshaw MD FC:[id number]      Scleroderma Ab 01/11/2017 <0.2  0.0 - 0.9 AI Final    Comment: (NOTE)  Performed At: [address] [id number]  Lee Bradshaw MD LB:[id number]      Mary-1 Ab 01/11/2017 <0.2  0.0 - 0.9 AI Final    Comment: (NOTE)  Performed At: [address] [id number]  Lee Bradshaw MD JY:[id number]      Anti-Ribosomal P A 01/11/2017 <0.2  0.0 - 0.9 AI Final    Comment: (NOTE)  Performed At: [address] [id number]  Lee Bradshaw MD GX:[id number]      Anti-Centromere B 01/11/2017 >8.0* 0.0 - 0.9 AI Final    Comment: (NOTE)  Performed At: [address] [id number]  [address] NP:[id number]     Hospital Outpatient Visit on 12/27/2016   Component Date Value Ref Range Status    C-Reactive protein 12/27/2016 4.5* 0 - 0.3 mg/dL Final    CK 12/27/2016 35  26 - 192 U/L Final    Sed rate, automated 12/27/2016 11  0 - 30 mm/hr Final   Admission on 12/21/2016, Discharged on 12/24/2016   Component Date Value Ref Range Status    Ventricular Rate 12/21/2016 86  BPM Final    Atrial Rate 12/21/2016 86  BPM Final    P-R Interval 12/21/2016 158  ms Final    QRS Duration 12/21/2016 74  ms Final    Q-T Interval 12/21/2016 438  ms Final    QTC Calculation (Baylee) 12/21/2016 524  ms Final    Calculated P Axis 12/21/2016 57  degrees Final    Calculated R Axis 12/21/2016 108  degrees Final    Calculated T Axis 12/21/2016 -37  degrees Final    Diagnosis 12/21/2016    Final                    Value:Normal sinus rhythm with sinus arrhythmia  Rightward axis  Nonspecific ST and T wave abnormality  Prolonged QT  Abnormal ECG  When compared with ECG of 09-JUL-2013 07:44,  T wave inversion now evident in Inferior leads  Nonspecific T wave abnormality now evident in Anterolateral leads  QT has lengthened  Confirmed by Beni Espinoza ([id number]) on 12/22/2016 10:10:17 AM      WBC 12/21/2016 13.2  4.6 - 13.2 K/uL Final    RBC 12/21/2016 4.47  4.20 - 5.30 M/uL Final    HGB 12/21/2016 12.6  12.0 - 16.0 g/dL Final    HCT 12/21/2016 38.1  35.0 - 45.0 % Final    MCV 12/21/2016 85.2  74.0 - 97.0 FL Final    MCH 12/21/2016 28.2  24.0 - 34.0 PG Final    MCHC 12/21/2016 33.1  31.0 - 37.0 g/dL Final    RDW 12/21/2016 17.6* 11.6 - 14.5 % Final    PLATELET 59/22/9268 135  135 - 420 K/uL Final    MPV 12/21/2016 10.0  9.2 - 11.8 FL Final    NEUTROPHILS 12/21/2016 90* 40 - 73 % Final    LYMPHOCYTES 12/21/2016 5* 21 - 52 % Final    MONOCYTES 12/21/2016 5  3 - 10 % Final    EOSINOPHILS 12/21/2016 0  0 - 5 % Final    BASOPHILS 12/21/2016 0  0 - 2 % Final    ABS. NEUTROPHILS 12/21/2016 12.0* 1.8 - 8.0 K/UL Final    ABS. LYMPHOCYTES 12/21/2016 0.7* 0.9 - 3.6 K/UL Final    ABS. MONOCYTES 12/21/2016 0.6  0.05 - 1.2 K/UL Final    ABS. EOSINOPHILS 12/21/2016 0.0  0.0 - 0.4 K/UL Final    ABS.  BASOPHILS 12/21/2016 0.0  0.0 - 0.1 K/UL Final    DF 12/21/2016 AUTOMATED    Final    Sodium 12/21/2016 138  136 - 145 mmol/L Final    Potassium 12/21/2016 3.6  3.5 - 5.5 mmol/L Final    Chloride 12/21/2016 101  100 - 108 mmol/L Final    CO2 12/21/2016 27  21 - 32 mmol/L Final    Anion gap 12/21/2016 10  3.0 - 18 mmol/L Final    Glucose 12/21/2016 143* 74 - 99 mg/dL Final    BUN 12/21/2016 24* 7.0 - 18 MG/DL Final    Creatinine 12/21/2016 0.95  0.6 - 1.3 MG/DL Final    BUN/Creatinine ratio 12/21/2016 25* 12 - 20   Final    GFR est AA 12/21/2016 >60  >60 ml/min/1.73m2 Final    GFR est non-AA 12/21/2016 56* >60 ml/min/1.73m2 Final    Comment: (NOTE)  Estimated GFR is calculated using the Modification of Diet in Renal   Disease (MDRD) Study equation, reported for both  Americans   (GFRAA) and non- Americans (GFRNA), and normalized to 1.73m2   body surface area. The physician must decide which value applies to   the patient. The MDRD study equation should only be used in   individuals age 25 or older. It has not been validated for the   following: pregnant women, patients with serious comorbid conditions,   or on certain medications, or persons with extremes of body size,   muscle mass, or nutritional status.  Calcium 12/21/2016 8.2* 8.5 - 10.1 MG/DL Final    Bilirubin, total 12/21/2016 0.7  0.2 - 1.0 MG/DL Final    ALT 12/21/2016 42  13 - 56 U/L Final    AST 12/21/2016 42* 15 - 37 U/L Final    Alk.  phosphatase 12/21/2016 38* 45 - 117 U/L Final    Protein, total 12/21/2016 6.1* 6.4 - 8.2 g/dL Final    Albumin 12/21/2016 3.0* 3.4 - 5.0 g/dL Final    Globulin 12/21/2016 3.1  2.0 - 4.0 g/dL Final    A-G Ratio 12/21/2016 1.0  0.8 - 1.7   Final    Magnesium 12/21/2016 1.5* 1.8 - 2.4 mg/dL Final    Prothrombin time 12/21/2016 13.9  11.5 - 15.2 sec Final    INR 12/21/2016 1.1  0.8 - 1.2   Final    Comment:            INR Therapeutic Ranges         (on stable oral anticoagulant):     INDICATION                INR  DVT/PE/Atrial Fib          2.0-3.0  MI/Mechanical Heart Valve  2.5-3.5      aPTT 12/21/2016 29.1  23.0 - 36.4 SEC Final    CK 12/21/2016 103  26 - 192 U/L Final    CK - MB 12/21/2016 2.5  0.5 - 3.6 ng/ml Final    CK-MB Index 12/21/2016 2.4  0.0 - 4.0 % Final    Troponin-I, Qt. 12/21/2016 0.03  0.0 - 0.045 NG/ML Final    Comment: The presence of detectable troponin above the reference range indicates myocardial injury which may be due to ischemia, myocarditis, trauma, etc.  Clinical correlation is necessary to establish the significance of this finding. Sequential testing is recommended to determine if the typical rise and fall of cTnI is demonstrated. Note:  Cardiac troponin I has a relatively long half life and may be present well after the CK MB has returned to baseline. The reference range is based on the 99th percentile of the referent population.  Color 12/21/2016 YELLOW    Final    Appearance 12/21/2016 CLEAR    Final    Specific gravity 12/21/2016 1.011  1.005 - 1.030   Final    pH (UA) 12/21/2016 5.5  5.0 - 8.0   Final    Protein 12/21/2016 30* NEG mg/dL Final    Glucose 12/21/2016 NEGATIVE   NEG mg/dL Final    Ketone 12/21/2016 NEGATIVE   NEG mg/dL Final    Bilirubin 12/21/2016 NEGATIVE   NEG   Final    Blood 12/21/2016 NEGATIVE   NEG   Final    Urobilinogen 12/21/2016 1.0  0.2 - 1.0 EU/dL Final    Nitrites 12/21/2016 NEGATIVE   NEG   Final    Leukocyte Esterase 12/21/2016 TRACE* NEG   Final    CK 12/21/2016 97  26 - 192 U/L Final    CK - MB 12/21/2016 2.7  0.5 - 3.6 ng/ml Final    CK-MB Index 12/21/2016 2.8  0.0 - 4.0 % Final    Troponin-I, Qt. 12/21/2016 0.03  0.0 - 0.045 NG/ML Final    Comment: The presence of detectable troponin above the reference range indicates myocardial injury which may be due to ischemia, myocarditis, trauma, etc.  Clinical correlation is necessary to establish the significance of this finding. Sequential testing is recommended to determine if the typical rise and fall of cTnI is demonstrated. Note:  Cardiac troponin I has a relatively long half life and may be present well after the CK MB has returned to baseline. The reference range is based on the 99th percentile of the referent population.       WBC 12/21/2016 2 to 4  0 - 4 /hpf Final    RBC 12/21/2016 NONE  0 - 5 /hpf Final    Epithelial cells 12/21/2016 3+  0 - 5 /lpf Final    Bacteria 12/21/2016 FEW* NEG /hpf Final    CK 12/21/2016 99  26 - 192 U/L Final    CK - MB 12/21/2016 3.8* 0.5 - 3.6 ng/ml Final    CK-MB Index 12/21/2016 3.8  0.0 - 4.0 % Final    Troponin-I, Qt. 12/21/2016 0.04  0.0 - 0.045 NG/ML Final    Comment: The presence of detectable troponin above the reference range indicates myocardial injury which may be due to ischemia, myocarditis, trauma, etc.  Clinical correlation is necessary to establish the significance of this finding. Sequential testing is recommended to determine if the typical rise and fall of cTnI is demonstrated. Note:  Cardiac troponin I has a relatively long half life and may be present well after the CK MB has returned to baseline. The reference range is based on the 99th percentile of the referent population.  CK 12/22/2016 89  26 - 192 U/L Final    CK - MB 12/22/2016 3.0  0.5 - 3.6 ng/ml Final    CK-MB Index 12/22/2016 3.4  0.0 - 4.0 % Final    Troponin-I, Qt. 12/22/2016 0.05* 0.0 - 0.045 NG/ML Final    Comment: The presence of detectable troponin above the reference range indicates myocardial injury which may be due to ischemia, myocarditis, trauma, etc.  Clinical correlation is necessary to establish the significance of this finding. Sequential testing is recommended to determine if the typical rise and fall of cTnI is demonstrated. Note:  Cardiac troponin I has a relatively long half life and may be present well after the CK MB has returned to baseline. The reference range is based on the 99th percentile of the referent population.       Hemoglobin A1c 12/21/2016 6.1* 4.2 - 5.6 % Final    Comment: (NOTE)  HbA1C Interpretive Ranges  <5.7              Normal  5.7 - 6.4         Consider Prediabetes  >6.5              Consider Diabetes      LIPID PROFILE 12/21/2016        Final    Cholesterol, total 12/21/2016 181  <200 MG/DL Final    Triglyceride 12/21/2016 94  <150 MG/DL Final    HDL Cholesterol 12/21/2016 34* 40 - 60 MG/DL Final    LDL, calculated 12/21/2016 128.2* 0 - 100 MG/DL Final    VLDL, calculated 12/21/2016 18.8  MG/DL Final    CHOL/HDL Ratio 12/21/2016 5.3* 0 - 5.0   Final    TSH 12/21/2016 0.74  0.36 - 3.74 uIU/mL Final    Ventricular Rate 12/22/2016 90  BPM Final    Atrial Rate 12/22/2016 90  BPM Final    P-R Interval 12/22/2016 142  ms Final    QRS Duration 12/22/2016 82  ms Final    Q-T Interval 12/22/2016 388  ms Final    QTC Calculation (Bezet) 12/22/2016 474  ms Final    Calculated P Axis 12/22/2016 49  degrees Final    Calculated R Axis 12/22/2016 110  degrees Final    Calculated T Axis 12/22/2016 15  degrees Final    Diagnosis 12/22/2016    Final                    Value:Normal sinus rhythm  Rightward axis  Abnormal ECG  When compared with ECG of 21-DEC-2016 18:56,  Nonspecific T wave abnormality no longer evident in Anterolateral leads  Confirmed by Jossue Joshi ([id number]) on 12/22/2016 10:02:45 AM      Magnesium 12/22/2016 1.7* 1.8 - 2.4 mg/dL Final    Sodium 12/22/2016 137  136 - 145 mmol/L Final    Potassium 12/22/2016 3.4* 3.5 - 5.5 mmol/L Final    Chloride 12/22/2016 104  100 - 108 mmol/L Final    CO2 12/22/2016 22  21 - 32 mmol/L Final    Anion gap 12/22/2016 11  3.0 - 18 mmol/L Final    Glucose 12/22/2016 130* 74 - 99 mg/dL Final    BUN 12/22/2016 26* 7.0 - 18 MG/DL Final    Creatinine 12/22/2016 0.96  0.6 - 1.3 MG/DL Final    BUN/Creatinine ratio 12/22/2016 27* 12 - 20   Final    GFR est AA 12/22/2016 >60  >60 ml/min/1.73m2 Final    GFR est non-AA 12/22/2016 56* >60 ml/min/1.73m2 Final    Calcium 12/22/2016 8.6  8.5 - 10.1 MG/DL Final       .No results found for any visits on 01/23/17. Assessment / Plan      ICD-10-CM ICD-9-CM    1. Hypoxia R09.02 799.02    2. Myalgia M79.1 729.1    3.  Essential hypertension, benign I10 401.1    4. Raynaud's disease without gangrene I73.00 443.0      she was advised to continue her maintenance medications  Will discuss with rheumatology and pulmonary for further evaluation    Follow-up Disposition:  Return in about 4 weeks (around 2/20/2017). I asked Sayra Felix if she has any questions and I answered the questions. Sayra Felix states that she understands the treatment plan and agrees with the treatment plan

## 2017-01-23 NOTE — PROGRESS NOTES
1. Have you been to the ER, urgent care clinic since your last visit? Hospitalized since your last visit? No    2. Have you seen or consulted any other health care providers outside of the [address] [place] [address] since your last visit? Include any pap smears or colon screening.  No

## 2017-01-25 PROBLEM — I50.9 CHF (CONGESTIVE HEART FAILURE) (HCC): Status: ACTIVE | Noted: 2017-01-01

## 2017-01-25 NOTE — ED NOTES
Hourly Rounding   Patient is in bed resting with eyes open , patient is stable on the monitor at this time. Will continue to monitor at this time. Patient has family at the bedside at this time. Call bell is within reach.

## 2017-01-25 NOTE — ED NOTES
Hourly Rounding   Patient is in bed resting with eyes open , patient is alert and oriented at this time. Will continue to monitor at this time. Call bell is within reach.

## 2017-01-25 NOTE — CONSULTS
Pulmonology Consult    Subjective:   Consult Note: 1/25/2017 12:39 PM    20 pack year h/o smoking, quit in 1992, came in with c/o left arm swelling. She says her abdomen and legs are always swollen. Denies any fever or chest pain. Does cough to bring up sputum  Lives alone, her children live in [place]      Past Medical History   Diagnosis Date    Acute bronchitis     Acute upper respiratory infections of unspecified site     Essential hypertension, benign     Fibular collateral ligament bursitis     Insomnia, unspecified     Myalgia and myositis, unspecified     Pure hypercholesterolemia     Raynaud's syndrome      Past Surgical History   Procedure Laterality Date    Hx hysterectomy      Hx appendectomy      Hx cholecystectomy      Hx bladder suspension      Hx other surgical Right      foot surgery      Family History   Problem Relation Age of Onset    Heart Disease Mother     Heart Disease Father     Other Sister      CNS aneurysm     Social History   Substance Use Topics    Smoking status: Former Smoker     Packs/day: 1.00     Years: 20.00     Quit date: 1/1/1992    Smokeless tobacco: Never Used    Alcohol use Yes      Comment: occas      Current Outpatient Prescriptions   Medication Sig Dispense Refill    doxepin (SINEQUAN) 10 mg capsule TAKE 1 TABLET BY MOUTH AT BEDTIME 90 Cap 3    estradiol (ESTRACE) 1 mg tablet TAKE 1 TABLET BY MOUTH EVERY DAY 90 Tab 0    umeclidinium-vilanterol (ANORO ELLIPTA) 62.5-25 mcg/actuation inhaler Take 1 Puff by inhalation daily. 2 Inhaler 0    predniSONE (DELTASONE) 5 mg tablet 3 tablets daily for one week. 2 1/2 tablets daily for one weekly, then 2 tablets daily 100 Tab 1    metOLazone (ZAROXOLYN) 2.5 mg tablet 1 tablet every other day (stop HCTZ) 20 Tab 1    umeclidinium-vilanterol (ANORO ELLIPTA) 62.5-25 mcg/actuation inhaler Take 1 Puff by inhalation daily.  1 Inhaler 0    inhalational spacing device Use with the inhaler 1 Device 0    ergocalciferol (VITAMIN D2) 50,000 unit capsule 1 capsule daily 12 Cap 3    promethazine (PHENERGAN) 25 mg tablet Take 25 mg by mouth every eight (8) hours as needed for Nausea.  PROAIR HFA 90 mcg/actuation inhaler INHALE 2 PUFFS BY MOUTH 3 TIMES A DAY AS NEEDED FOR CHEST CONGESTION 8.5 Inhaler 0    doxepin (SINEQUAN) 10 mg capsule 1 tablet at bedtime 90 Cap 3    ALPRAZolam (XANAX) 0.25 mg tablet 1/2 tablet twice per day as needed for stress 30 Tab 1    gabapentin (NEURONTIN) 300 mg capsule TAKE 2 CAPSULE BY MOUTH AT BEDTIME 180 Cap 3    NIFEdipine ER (PROCARDIA XL) 30 mg ER tablet TAKE 1 TABLET BY MOUTH DAILY 90 Tab 3        Allergies   Allergen Reactions    Zithromax [Azithromycin] Nausea Only    Sulfa (Sulfonamide Antibiotics) Itching    Penicillins Other (comments)     Yeast infection       Review of Systems:  Constitutional: positive for malaise  Eyes: negative for contacts/glasses and visual disturbance  Ears, nose, mouth, throat, and face: negative for hearing loss and nasal congestion  Respiratory: positive for cough  Cardiovascular: negative for chest pain  Gastrointestinal: negative for dysphagia and reflux symptoms  Genitourinary:negative for frequency and urinary incontinence  Hematologic/lymphatic: negative for easy bruising and bleeding  Musculoskeletal:negative for myalgias and stiff joints  Neurological: negative for headaches and vertigo  Behavioral/Psych: negative for abusive relationship and anorexia  Endocrine: negative for fertility problems and temperature intolerance  Allergic/Immunologic: negative for urticaria and angioedema    Objective:     Blood pressure 103/64, pulse 94, temperature 97.3 °F (36.3 °C), resp. rate 18, weight 75.4 kg (166 lb 4.8 oz), SpO2 92 %.  Temp (24hrs), Av.3 °F (36.3 °C), Min:97.3 °F (36.3 °C), Max:97.3 °F (36.3 °C)      Intake and Output:  Current Shift:    Last 3 Shifts:      Physical Exam:   Visit Vitals    /64    Pulse 94    Temp 97.3 °F (36.3 °C)    Resp 18    Wt 75.4 kg (166 lb 4.8 oz)    SpO2 92%    BMI 28.55 kg/m2     General:  Alert, cooperative, no distress, appears stated age. Head:  Normocephalic, without obvious abnormality, atraumatic. Eyes:  Conjunctivae/corneas clear. PERRL, EOMs intact. Fundi benign. Ears:  Normal TMs and external ear canals both ears. Nose: Nares normal. Septum midline. Mucosa normal. No drainage or sinus tenderness. Throat: Lips, mucosa, and tongue normal. Teeth and gums normal.   Neck: Supple, symmetrical, trachea midline, no adenopathy, thyroid: no enlargement/tenderness/nodules, no carotid bruit and  JVD up to angle of mouth/ear   Back:   Symmetric, no curvature. ROM normal. No CVA tenderness. Lungs:   Clear to auscultation bilaterally. Chest wall:  No tenderness or deformity. Heart:  Regular rate and rhythm, S1, S2 normal, no murmur, click, rub or gallop. Breast Exam:  No tenderness, masses, or nipple abnormality. Abdomen:   Soft, non-tender. Bowel sounds normal. No masses,  No organomegaly. Genitalia:  Normal female without lesion, discharge or tenderness. Rectal:  Normal tone,  no masses or tenderness  Guaiac negative stool. Extremities: Extremities normal, atraumatic, no cyanosis or edema. Pulses: 4+ and symmetric all extremities. Skin: Skin color, texture, turgor normal. No rashes or lesions. Lymph nodes: Cervical, supraclavicular, and axillary nodes normal.   Neurologic: CNII-XII intact. Normal strength, sensation and reflexes throughout.        Additional comments:None    Lab/Data Review:  BMP:   Lab Results   Component Value Date/Time     (L) 01/25/2017 06:40 AM    K 5.2 01/25/2017 06:40 AM    CL 96 (L) 01/25/2017 06:40 AM    CO2 29 01/25/2017 06:40 AM    AGAP 10 01/25/2017 06:40 AM     (H) 01/25/2017 06:40 AM    BUN 42 (H) 01/25/2017 06:40 AM    CREA 1.20 01/25/2017 06:40 AM    GFRAA 52 (L) 01/25/2017 06:40 AM    GFRNA 43 (L) 01/25/2017 06:40 AM     CBC:   Lab Results Component Value Date/Time    WBC 15.3 (H) 01/25/2017 06:40 AM    HGB 13.4 01/25/2017 06:40 AM    HCT 40.0 01/25/2017 06:40 AM     01/25/2017 06:40 AM       Chest X-Ray: right pleural effusion    Assessment:     Active Problems:    CHF (congestive heart failure) ([address] (1/25/2017)      Right heart failure due to severe pul htn    Plan:     Reviewed all her records extensively  Patient has severe pul htn causing right heart failure : she is hypotensive and has anasarca. Prognosis is guarded  Cause for pul htn : ? Scleroderma.  ? CTEPH   Need a very thorough w/u : which includes right heart cath, rheumatoid w/u, v/q scan  Clinically no evidence of pneumonia  Her right heart failure is the cause for the right pleural effusion  Recommend diuresis with right heart cath and re measure right heart pressures  Advise referral to Pul htn clinic  Spent more than 45 minutes discussing everything with the patient and her family  Case d/w

## 2017-01-25 NOTE — ED TRIAGE NOTES
Pt brought in by EMS for swelling in her left upper extremity. Pt presents with 4+ edema to both lower extremities and 1+ to LUE. Pt is AxOx4, NAD.

## 2017-01-25 NOTE — ED PROVIDER NOTES
HPI Comments: 6:38 AM Jasvir Little is a 80 y.o. female with a h/o Raynaud's Disease, presents to the ED via EMS with complaints of her left arm being swollen as of this morning. She states it was normal when she went to bed last night, however her legs where \"swollen\". She address that she is on continued 2 L of oxygen. She lives at home with her sister. She denies any fever, nausea, vomiting, diarrhea, cough, SOB, chest pain, and/or rash. She has had cough for 2 days that is productive and dark colored. She noted that she took two of her \"water\" pills, and that she also was recently admitted for Bronchitis and an allergic reaction to the Z-Pack. She denies any hx of CHF, and breast cancer. She has not used tobacco products in twenty-six years. No further symptoms or complaints expressed at this time. Pt's PCP is Anton Hill MD              The history is provided by the patient. Past Medical History:   Diagnosis Date    Acute bronchitis     Acute upper respiratory infections of unspecified site     Essential hypertension, benign     Fibular collateral ligament bursitis     Insomnia, unspecified     Myalgia and myositis, unspecified     Pure hypercholesterolemia     Raynaud's syndrome        Past Surgical History:   Procedure Laterality Date    Hx hysterectomy      Hx appendectomy      Hx cholecystectomy      Hx bladder suspension      Hx other surgical Right      foot surgery         Family History:   Problem Relation Age of Onset    Heart Disease Mother     Heart Disease Father     Other Sister      CNS aneurysm       Social History     Social History    Marital status:      Spouse name: N/A    Number of children: N/A    Years of education: N/A     Occupational History    Not on file.      Social History Main Topics    Smoking status: Former Smoker     Packs/day: 1.00     Years: 20.00     Quit date: 1/1/1992    Smokeless tobacco: Never Used    Alcohol use Yes Comment: occas    Drug use: No    Sexual activity: No     Other Topics Concern    Not on file     Social History Narrative         ALLERGIES: Zithromax [azithromycin]; Sulfa (sulfonamide antibiotics); and Penicillins    Review of Systems   Constitutional: Negative for chills and fever. HENT: Negative for congestion, nosebleeds and sore throat. Eyes: Negative for pain. Respiratory: Positive for cough. Negative for shortness of breath. Cardiovascular: Positive for leg swelling. Negative for chest pain and palpitations. Gastrointestinal: Negative for abdominal pain, constipation, nausea and vomiting. Genitourinary: Negative for dysuria and flank pain. Musculoskeletal: Negative for back pain and neck pain. Right arm swollen     Skin: Negative for color change and rash. Neurological: Negative for dizziness, weakness and headaches. All other systems reviewed and are negative. There were no vitals filed for this visit. Physical Exam   Constitutional: She is oriented to person, place, and time. She appears well-developed and well-nourished. Non-toxic appearance. She does not have a sickly appearance. She does not appear ill. No distress. Nasal cannula in place. HENT:   Head: Normocephalic and atraumatic. Mouth/Throat: Oropharynx is clear and moist. No oropharyngeal exudate. Eyes: Conjunctivae and EOM are normal. Pupils are equal, round, and reactive to light. No scleral icterus. Neck: Trachea normal and normal range of motion. Neck supple. No hepatojugular reflux and no JVD present. No tracheal deviation present. No thyromegaly present. Cardiovascular: Normal rate, regular rhythm, S1 normal, S2 normal, normal heart sounds, intact distal pulses and normal pulses. Exam reveals no gallop, no S3 and no S4. No murmur heard. Pulses:       Radial pulses are 2+ on the right side, and 2+ on the left side.         Dorsalis pedis pulses are 2+ on the right side, and 2+ on the left side. Pulmonary/Chest: Effort normal and breath sounds normal. No respiratory distress. She has no decreased breath sounds. She has no wheezes. She has no rhonchi. She has no rales. Abdominal: Soft. Normal appearance and bowel sounds are normal. She exhibits no distension and no mass. There is no hepatosplenomegaly. There is no tenderness. There is no rigidity, no rebound, no guarding, no CVA tenderness, no tenderness at McBurney's point and negative Aldana's sign. Musculoskeletal: Normal range of motion. She exhibits edema (3+ up to lower abdomen). Strength 4/5 throughout    Lymphadenopathy:        Head (right side): No submental, no submandibular, no preauricular and no occipital adenopathy present. Head (left side): No submental, no submandibular, no preauricular and no occipital adenopathy present. She has no cervical adenopathy. Right: No supraclavicular adenopathy present. Left: No supraclavicular adenopathy present. Neurological: She is alert and oriented to person, place, and time. She has normal strength and normal reflexes. She is not disoriented. No cranial nerve deficit or sensory deficit. Coordination and gait normal. GCS eye subscore is 4. GCS verbal subscore is 5. GCS motor subscore is 6. Grossly intact    Skin: Skin is warm, dry and intact. No rash noted. She is not diaphoretic. Psychiatric: She has a normal mood and affect. Her speech is normal and behavior is normal. Judgment and thought content normal. Cognition and memory are normal.   Nursing note and vitals reviewed. MDM  Number of Diagnoses or Management Options  Diagnosis management comments: CHF  Peripheral edema  Pleural effusion   Pneumonia  COPD  ACS    ED Course       Procedures      Consult:  Discussed care with Dr. Cody Keenan, hospitalist. Standard discussion; including history of patients chief complaint, available diagnostic results, and treatment course.  He agrees with admission, but wants Dr. Jessika Cortez to be the admitting physician.  8:36 AM    Consult:  Discussed care with Dr. Jelani Bonilla, pulmonology. Standard discussion; including history of patients chief complaint, available diagnostic results, and treatment course. She agrees to see the patient. 8:39 AM    Consult:  Discussed care with Dr. Shawn Bennett, cardiology. Standard discussion; including history of patients chief complaint, available diagnostic results, and treatment course. He will see the patient. 8:39 AM    Labs essentially normal with the exception of WBC of 15.3 and BNP of 13796. UA is negative. EKG showed NSR with rate of 92 bpm. With no ST elevations or depression and non specific T wave changes. Chest X-Ray showed:  Small to moderate right pleural effusion. Tiny left pleural effusion. Left mid lateral lung infiltrate likely in the left lower lobe. Probable right lower lobe infiltrate or atelectasis  Stable cardiomegaly. 8:54 AM 1/25/2017      Scribe Attestation:   LAUREN Mcneal am scribing for and in the presence of [address] DO on this day 01/25/17 at 6:45 AM   Zoila Scanlon Franky Oliva, am scribing for and in the presence of [address],  January 25, 2017 at 8:37 AM     Provider Attestation:  Personally performed the services described in the documentation, reviewed the documentation, as recorded by the scribe in my presence, and it accurately and completely records my words and actions.   Erick Braun DO. 6:45 AM      Signed by: Zoila Scanlon, 01/25/17 , 6:45 AM  Signed by: Zoila Beltran, 01/25/17, 8:37 AM

## 2017-01-25 NOTE — ED NOTES
[facility]   Patient is in bed resting with eyes open , patient is stable on the monitor at this time. Patient is alert and oriented at this time. Will continue to monitor at this time. Patient has family at the bedside.

## 2017-01-25 NOTE — PROCEDURES
[facility]  [facility] ***    Name: Marilin Kaiser  MRN: [id number]    Inpatient  : 15 Nov 1933  HIS Order #: [id number]  [address] [facility] Visit #: [id number]  Date: 2017    TYPE OF TEST: Peripheral Venous Testing    REASON FOR TEST  Limb swelling    Left Arm:-  Deep venous thrombosis:           No  Superficial venous thrombosis:    No      INTERPRETATION/FINDINGS  Duplex images were obtained using 2-D gray scale, color flow, and  spectral Doppler analysis. Left arm :  1. Deep veins visualized include the internal jugular, subclavian,  axillary and brachial veins. 2. No evidence of deep venous thrombosis detected in the veins  visualized. Augmentation is reduced in the left arm veins but all  compress readily. 3. No evidence of deep vein thrombosis in the contralateral internal  jugular and subclavian veins. 4. Superficial veins visualized include the basilic (upper arm) and  cephalic (upper arm) veins. 5. No evidence of superficial thrombosis detected. ADDITIONAL COMMENTS    I have personally reviewed the data relevant to the interpretation of  this  study. TECHNOLOGIST: CRISTOFER Myers, RVS  Signed: 2017 03:02 PM    PHYSICIAN: Luis Alfredo Grant.  Rosa Coffey MD  Signed: 2017 04:34 PM

## 2017-01-25 NOTE — ED NOTES
Bedside and Verbal shift change report given to Anmol Dempsey RN (oncoming nurse) by Martha Saab RN (offgoing nurse). Report included the following information SBAR, ED Summary, MAR and Recent Results.

## 2017-01-25 NOTE — ED NOTES
TRANSFER - OUT REPORT:    Verbal report given to Miguel RN (name) on Blayne Jones  being transferred to 3  (unit) for routine progression of care       Report consisted of patients Situation, Background, Assessment and   Recommendations(SBAR). Information from the following report(s) SBAR was reviewed with the receiving nurse. Lines:   Peripheral IV 01/25/17 Right Antecubital (Active)   Site Assessment Clean, dry, & intact 1/25/2017  7:00 AM   Phlebitis Assessment 0 1/25/2017  7:00 AM   Infiltration Assessment 0 1/25/2017  7:00 AM   Dressing Status Clean, dry, & intact 1/25/2017  7:00 AM   Dressing Type Transparent 1/25/2017  7:00 AM   Hub Color/Line Status Patent; Flushed 1/25/2017  7:00 AM        Opportunity for questions and clarification was provided.       Patient transported with:   Monitor  O2 @ 2 liters

## 2017-01-25 NOTE — Clinical Note
Status[de-identified] Inpatient [101] Type of Bed: Telemetry [19] Inpatient Hospitalization Certified Necessary for the Following Reasons: 3. Patient receiving treatment that can only be provided in an inpatient setting (further clarification in H&P documentation) Admitting Diagnosis: CHF (congestive heart failure) ([facility] [[id number]] Admitting Physician: Meredith Contreras [[id number]] Attending Physician: Meredith Contreras [[id number]] Estimated Length of Stay: 3-4 Midnights Discharge Plan[de-identified] Home with Office Follow-up

## 2017-01-25 NOTE — CONSULTS
[address]    Name:  Jose Wellington  MR#:  [id number]  :  11/15/1933  Account #:  [de-identified]  Date of Adm:  2017  Date of Consultation:  2017      CARDIOLOGY CONSULTATION    REQUESTED BY: Hospitalist.    REASON FOR CONSULTATION: Congestive heart failure. HISTORY: The patient is a very pleasant, 80-year-old,   American female who was admitted about a month ago with  congestive heart failure and has come to the emergency room due to  persisting dyspnea, edema and developing left arm swelling in the last  24 hours or so. She states she has had chronic edema in the legs and  abdomen for a long time, but it has been gradually getting worse. Denies any chest pain, palpitations, severe dizziness or loss of  consciousness. She describes episodes of sickness, which she is not  able to elaborate any more, which happens suddenly and she has to  lay down otherwise she thinks she will fall. She denies any blurring  vision in these episodes, but legs seem to get weak suddenly. PAST HISTORY: Positive for rheumatoid arthritis. She used to see Dr. Nadia Reich, but has not seen her for a long time. She follows with her  PCP, Dr. Mari Corral, regularly who has asked her to see Dr. Nadia Reich  again. 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 She has a history of Raynaud syndrome. There is no history of  hypertension, diabetes, or hyperlipidemia. There is no history of  cardiac events in the past.    FAMILY HISTORY: No history of strokes, seizures, bleeding disorders  or clotting disorders. No history of chronic liver or kidney disease. She  has had episodes of bronchitis occasionally, 1 was a few months ago  and prior to that was 3 years ago. REVIEW OF SYSTEMS: No recent fever, vomiting, diarrhea,  hematuria, or dysuria.     PAST SURGICAL HISTORY: Include hysterectomy, appendectomy,  cholecystectomy, foot surgery. FAMILY HISTORY: Negative for premature coronary artery disease,  stroke, sudden death or clotting disorders. PERSONAL HISTORY: Quit smoking 26 years ago. No alcohol or drug  abuse. ALLERGIES  1. ZITHROMAX, CAUSED NAUSEA. 2. SULFA, REPORTED WITH ITCHING. 3. PENICILLIN; HAD YEAST INFECTION, WHICH MAY ALSO NOT  BE A TRUE ALLERGY. MEDICATIONS IN THE HOSPITAL  Include:  1. Vitamin. 2. Subcutaneous Lovenox for DVT prevention. 3. Doxepin. 4. Estrace. 5. Neurontin. 6. Levaquin. 7. Prednisone. HOME MEDICATIONS  Include:  1. Vitamin. 2. Subcutaneous Lovenox for DVT prevention. 3. Doxepin. 4. Estrace. 5. Neurontin. 6. Levaquin. 7. Prednisone. 8. Zaroxolyn 2.5 mg every other day. I do not see any other diuretics  listed in the home medications. 9. Procardia 30 mg a day. PHYSICAL EXAMINATION  GENERAL: The patient is alert, oriented, pleasant, cooperative. She is  grossly edematous in the legs and abdomen area. She thinks her  breast may also be edematous. VITAL SIGNS: Heart rate is 100 to 110, sinus tachycardia. Blood  pressure 108/63, 99% saturation. NECK: JVD is elevated. No bruits or lymph nodes in the neck. LUNGS: Bilateral basal rales and few scattered rales are also heard in  the mid lung zone on inspiration. CARDIAC: S1, S2 regular. There is a relatively long harsh sounding  systolic murmur heard best at left lower parasternal border, P2 is loud. I did not appreciate any MR murmur definitely. No diastolic murmurs  heard. ABDOMEN: Distended, no definite masses or shifting dullness is  present indicating ascites. There is mild diffuse tenderness in the right  upper quadrant. EXTREMITIES: 2 to 3+ edema. Distal pulses are reduced but present.     IMAGING: A 2-D echo done 12/22/2016 revealed 55% to 60% ejection  fraction, pulmonary pressure of 85, dilated right atrium, right ventricle,  mild to moderate aortic incompetence, moderate to severe tricuspid  regurgitation, small pericardial effusion. LABORATORY DATA: White count is elevated at 15.3, hemoglobin  13.4, platelets 028,451. Urinalysis is negative. PT/INR and PTT are  normal. Chemistry with potassium of 5.2, BUN 42, creatinine 1.2,  magnesium 1.8. Normal liver enzymes. Albumin is low at 2.8. CPK and  MB are negative. Troponin I is 0.03, which is also negative. NT-  proBNP is elevated at 13,724. C-reactive protein was high on  11/17/2016 at 13.3. Collagen vascular testing shows positive CHANTAL, rheumatoid factor,  anticentromere antibody. A double-stranded DNA, SSA and SSB are  negative. I do not see any HIV test here, but per the patient it was  done in the past.    DIAGNOSTIC DATA: A 12-lead EKG shows sinus rhythm. Pulmonary  disease pattern with right axis, possible septal infarct. QT interval,  which was long in the previous admission, has resolved to normal. It  was likely related to Zithromax. IMPRESSION  1. Congestive heart failure, chronic diastolic dysfunction. Mainly right  heart failure secondary to severe pulmonary hypertension, which is  most likely related to rheumatoid arthritis/other collagen vascular  disease in this the patient; however, other etiologies including chronic  thromboembolic pulmonary hypertension should be ruled out. A CT  scan of the chest done 12/23/2016 was reported without any evidence  of pulmonary embolism, however, in chronic ones V/Q scan may be  better sensitivity. The patient has gross fluid overload and anasarca. There is no evidence of severe alkalosis yet. 2. 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 Eventually, she  should get a right heart catheterization to evaluate pulmonary  vasodilator drugs. 3. History of rheumatoid arthritis, which is probably the root cause of  all her illnesses now.     RECOMMENDATIONS: Would tend to diurese gently with a Lasix drip  at 5 mg an hour. If there is too much diuresis that we will reduce the  drip rate. Followup electrolytes closely, as well as the blood pressure. I  have explained to the family that the prognosis is guarded. Eventually  right heart catheterization and consideration of other pulmonary  vasoactive drugs. Will follow from cardiac standpoint.     Thank you for your kind referral.        MD JOHN Weaver / Steven Benitez  D:  01/25/2017   16:49  T:  01/25/2017   17:45  Job #:  [id number]

## 2017-01-26 PROBLEM — R06.02 SOB (SHORTNESS OF BREATH): Status: ACTIVE | Noted: 2017-01-01

## 2017-01-26 NOTE — PROGRESS NOTES
Problem: Mobility Impaired (Adult and Pediatric)  Goal: *Acute Goals and Plan of Care (Insert Text)  Physical Therapy Goals  Initiated 1/26/2017 and to be accomplished within 7 day(s)  1. Patient will move from supine to sit and sit to supine , scoot up and down and roll side to side in bed with independence. 2. Patient will transfer from bed to chair and chair to bed with modified independence using the least restrictive device. 3. Patient will perform sit to stand with modified independence. 4. Patient will ambulate with supervision/set-up for 100 feet with the least restrictive device. 5. Patient will ascend/descend 3 stairs with right handrail(s) with contact guard assist.  PHYSICAL THERAPY EVALUATION     Patient: Grey Vizcaino (80 y.o. female)  Date: 1/26/2017  Primary Diagnosis: CHF (congestive heart failure) ([facility])  CHF (congestive heart failure) ([facility] [address]        Precautions: Fall      ASSESSMENT :  Based on the objective data described below, the patient presents with decreased strength, decreased activity tolerance, impaired balance, and impaired functional mobility. Pt cooperative and willing to work with PT; however, appeared very weak and tired today. Pt reports feeling \"weak\" and \"so sleepy\". Pt required min A for supine/sit, min A for sit/stand, and min A for stand step bed <-> chair. Pt did not have recliner chair in room and appeared too tired and weak to remain in regular arm chair safely, so transferred back to bed. Pt was living alone prior and ambulated with a SPC. Due to decline in function, decreased safety, and medical status, pt would benefit from acute rehab prior to returning home alone. Patient will benefit from skilled intervention to address the above impairments.   Patients rehabilitation potential is considered to be Good  Factors which may influence rehabilitation potential include:   [ ]         None noted  [ ]         Mental ability/status  [X]         Medical condition  [ ]         Home/family situation and support systems  [ ]         Safety awareness  [ ]         Pain tolerance/management  [ ]         Other:        PLAN :  Recommendations and Planned Interventions:  [ ]           Bed Mobility Training             [ ]    Neuromuscular Re-Education  [ ]           Transfer Training                   [ ]    Orthotic/Prosthetic Training  [ ]           Gait Training                          [ ]    Modalities  [ ]           Therapeutic Exercises          [ ]    Edema Management/Control  [ ]           Therapeutic Activities            [ ]    Patient and Family Training/Education  [ ]           Other (comment):     Frequency/Duration: Patient will be followed by physical therapy 1-2 times per day/4-7 days per week to address goals. Discharge Recommendations: Rehab  Further Equipment Recommendations for Discharge: rolling walker       G-CODES      Mobility  Current  CK= 40-59%   Goal  CI= 1-19%. The severity rating is based on the Level of Assistance required for Functional Mobility and ADLs.            G-CODES      Eval Complexity: History: MEDIUM  Complexity : 1-2 comorbidities / personal factors will impact the outcome/ POC Exam:MEDIUM Complexity : 3 Standardized tests and measures addressing body structure, function, activity limitation and / or participation in recreation  Presentation: MEDIUM Complexity : Evolving with changing characteristics  Clinical Decision Making:MEDIUM Complexity Overall Complexity:MEDIUM      SUBJECTIVE:   Patient stated I'm so weak and sleepy today.  \"I wasn't like this yesterday, I was able to walk with my cane. \" \"How did I get so weak this quick? \"  \"I want to get out of the bed, I don't want the bed zapping the strength out of me\"      OBJECTIVE DATA SUMMARY:       Past Medical History   Diagnosis Date    Acute bronchitis      Acute upper respiratory infections of unspecified site      Essential hypertension, benign      Fibular collateral ligament bursitis      Insomnia, unspecified      Myalgia and myositis, unspecified      Pure hypercholesterolemia      Raynaud's syndrome       Past Surgical History   Procedure Laterality Date    Hx hysterectomy        Hx appendectomy        Hx cholecystectomy        Hx bladder suspension        Hx other surgical Right         foot surgery     Prior Level of Function/Home Situation: pt reports living at home alone, had home health assistance with bathing, has help from her children. Was in process of trying to set up more assistance in the home. Pt states she used a cane to walk at home and has a rollator RW, but \"haven't used it yet. \"  Home Situation  Home Environment: Private residence  # Steps to Enter: 3  Rails to Enter: Yes  Hand Rails : Right  Wheelchair Ramp: No  One/Two Story Residence: One story  Living Alone: Yes  Support Systems: Child(kiran), Friends \ neighbors  Patient Expects to be Discharged to[de-identified] Private residence  Current DME Used/Available at Home: [address], [place], straight, Walker, rollator     Critical Behavior:  Neurologic State: Drowsy  Orientation Level: Oriented X4  Cognition: Follows commands  Safety/Judgement: Fall prevention  Psychosocial  Patient Behaviors: Calm; Cooperative  Purposeful Interaction: Yes  Pt Identified Daily Priority: Clinical issues (comment)  Caritas Process: Nurture loving kindness;Enable sushil/hope;Establish trust;Nurture spiritual self;Teaching/learning; Attend basic human needs;Create healing environment  Caring Interventions: Reassure  Reassure:  Therapeutic listening;Caring rounds        Strength:    Strength: Generally decreased, functional (B LE's grossly 4-/5)     Tone & Sensation:   Tone: Normal (B LE's)      Range Of Motion:  AROM: Within functional limits (B LE's)      Functional Mobility:  Bed Mobility:  Supine to Sit: Minimum assistance  Sit to Supine: Minimum assistance  Scooting: Contact guard assistance     Transfers:  Sit to Stand: Contact guard assistance;Minimum assistance (min A from EOB, CGA from chair)  Stand to Sit: Contact guard assistance  Bed to Chair: Minimum assistance (stand step transfers)              Balance:   Sitting: Impaired  Sitting - Static: Good (unsupported)  Sitting - Dynamic: Fair (occasional)  Standing: Impaired; With support  Standing - Static: Fair  Standing - Dynamic : Fair     Ambulation/Gait Training:  Distance (ft): 2 Feet (ft) (small steps bed <-> chair)  Assistive Device: Other (comment) (none, therapist support)  Ambulation - Level of Assistance: Minimal assistance  Gait Description (WDL): Exceptions to WDL  Gait Abnormalities: Decreased step clearance  Base of Support: Narrowed  Speed/Rocio: Slow  Step Length: Left shortened;Right shortened        Pain:  Pt reports 0/10 pain or discomfort prior to treatment. Pt reports 0/10 pain or discomfort post treatment. Activity Tolerance:   Fair, pt without c/o's, but fatigued easily and seemed drowsy/weak during session. Noted some feelings of \"whooziness\" with initial sitting up to EOB, but resolved after 1-2 minutes sitting at EOB. Please refer to the flowsheet for vital signs taken during this treatment. After treatment:   [ ]         Patient left in no apparent distress sitting up in chair  [X]         Patient left in no apparent distress in bed  [X]         Call bell left within reach  [X]         Nursing notified  [ ]         Caregiver present  [ ]         Bed alarm activated      COMMUNICATION/EDUCATION:   [X]         Fall prevention education was provided and the patient/caregiver indicated understanding. [X]         Patient/family have participated as able in goal setting and plan of care. [X]         Patient/family agree to work toward stated goals and plan of care. [ ]         Patient understands intent and goals of therapy, but is neutral about his/her participation. [ ]         Patient is unable to participate in goal setting and plan of care.      Thank you for this referral.  Arnoldo Mari, PT   Time Calculation: 32 mins

## 2017-01-26 NOTE — PROGRESS NOTES
[facility] Hospitalist Group  Progress Note    Patient: Whit Jacksonain Age: 80 y.o. : 11/15/1933 MR#: [id number] SSN: [id number]  Date/Time: 2017 12:00 PM    Subjective:     Edema less on lasix gtt    Objective:   VS:   Visit Vitals    BP 93/58 (BP 1 Location: Left arm, BP Patient Position: At rest)    Pulse 92    Temp 97.4 °F (36.3 °C)    Resp 18    Wt 75.1 kg (165 lb 8 oz)    SpO2 91%    Breastfeeding No    BMI 28.41 kg/m2      Tmax/24hrs: Temp (24hrs), Av.7 °F (36.5 °C), Min:97.2 °F (36.2 °C), Max:98.3 °F (36.8 °C)     Input/Output:   Intake/Output Summary (Last 24 hours) at 17 1200  Last data filed at 17 1019   Gross per 24 hour   Intake              560 ml   Output             3575 ml   Net            -3015 ml       General:  nad  Cardiovascular:  s1s2  Pulmonary:  clear  GI:  benign  Extremities:  1+ le edema  Additional:  No skin changes    Labs:    Recent Results (from the past 24 hour(s))   METABOLIC PANEL, COMPREHENSIVE    Collection Time: 17  3:46 AM   Result Value Ref Range    Sodium 134 (L) 136 - 145 mmol/L    Potassium 2.8 (LL) 3.5 - 5.5 mmol/L    Chloride 95 (L) 100 - 108 mmol/L    CO2 26 21 - 32 mmol/L    Anion gap 13 3.0 - 18 mmol/L    Glucose 90 74 - 99 mg/dL    BUN 39 (H) 7.0 - 18 MG/DL    Creatinine 1.12 0.6 - 1.3 MG/DL    BUN/Creatinine ratio 35 (H) 12 - 20      GFR est AA 56 (L) >60 ml/min/1.73m2    GFR est non-AA 46 (L) >60 ml/min/1.73m2    Calcium 8.1 (L) 8.5 - 10.1 MG/DL    Bilirubin, total 0.5 0.2 - 1.0 MG/DL    ALT 23 13 - 56 U/L    AST 19 15 - 37 U/L    Alk.  phosphatase 39 (L) 45 - 117 U/L    Protein, total 5.7 (L) 6.4 - 8.2 g/dL    Albumin 2.1 (L) 3.4 - 5.0 g/dL    Globulin 3.6 2.0 - 4.0 g/dL    A-G Ratio 0.6 (L) 0.8 - 1.7     CBC WITH AUTOMATED DIFF    Collection Time: 17  3:46 AM   Result Value Ref Range    WBC 20.6 (H) 4.6 - 13.2 K/uL    RBC 4.43 4.20 - 5.30 M/uL    HGB 12.3 12.0 - 16.0 g/dL    HCT 37.2 35.0 - 45.0 %    MCV 84.0 74.0 - 97.0 FL    MCH 27.8 24.0 - 34.0 PG    MCHC 33.1 31.0 - 37.0 g/dL    RDW 18.2 (H) 11.6 - 14.5 %    PLATELET 994 244 - 004 K/uL    MPV 9.5 9.2 - 11.8 FL    NEUTROPHILS 87 (H) 42 - 75 %    BANDS 1 0 - 5 %    LYMPHOCYTES 3 (L) 20 - 51 %    MONOCYTES 8 2 - 9 %    EOSINOPHILS 1 0 - 5 %    BASOPHILS 0 0 - 3 %    ABS. NEUTROPHILS 18.2 (H) 1.8 - 8.0 K/UL    ABS. LYMPHOCYTES 0.6 (L) 0.8 - 3.5 K/UL    ABS. MONOCYTES 1.6 (H) 0 - 1.0 K/UL    ABS. EOSINOPHILS 0.2 0.0 - 0.4 K/UL    ABS.  BASOPHILS 0.0 0.0 - 0.06 K/UL    DF MANUAL      PLATELET COMMENTS ADEQUATE PLATELETS      RBC COMMENTS ANISOCYTOSIS  1+        RBC COMMENTS POLYCHROMASIA  1+        RBC COMMENTS TARGET CELLS  2+       MAGNESIUM    Collection Time: 01/26/17  3:46 AM   Result Value Ref Range    Magnesium 1.3 (L) 1.8 - 2.4 mg/dL     Additional Data Reviewed:      Current Facility-Administered Medications   Medication Dose Route Frequency    magnesium sulfate 2 g/50 ml IVPB (premix or compounded)  2 g IntraVENous ONCE    potassium chloride (K-DUR, KLOR-CON) SR tablet 40 mEq  40 mEq Oral TID    ALPRAZolam (XANAX) tablet 0.125 mg  0.125 mg Oral BID PRN    doxepin (SINEquan) capsule 10 mg  10 mg Oral QPM    ergocalciferol (ERGOCALCIFEROL) capsule 50,000 Units  50,000 Units Oral Q7D    estradiol (ESTRACE) tablet 1 mg  1 mg Oral DAILY    gabapentin (NEURONTIN) capsule 600 mg  600 mg Oral QHS    predniSONE (DELTASONE) tablet 10 mg  10 mg Oral DAILY WITH BREAKFAST    umeclidinium-vilanterol (ANORO ELLIPTA) 62.5 mcg- 25 mcg/inhalation  1 Puff Inhalation DAILY    acetaminophen (TYLENOL) tablet 650 mg  650 mg Oral Q4H PRN    HYDROcodone-acetaminophen (NORCO) 5-325 mg per tablet 1 Tab  1 Tab Oral Q4H PRN    naloxone (NARCAN) injection 0.4 mg  0.4 mg IntraVENous PRN    ondansetron (ZOFRAN) injection 4 mg  4 mg IntraVENous Q4H PRN    enoxaparin (LOVENOX) injection 40 mg  40 mg SubCUTAneous Q24H    levoFLOXacin (LEVAQUIN) 250 mg in D5W IVPB  250 mg IntraVENous Q24H    furosemide (LASIX) 100 mg in 0.9% sodium chloride 100 mL infusion  5 mg/hr IntraVENous CONTINUOUS    albuterol (PROVENTIL VENTOLIN) nebulizer solution 2.5 mg  2.5 mg Nebulization Q6H PRN       Assessment/Plan:     Patient Active Problem List   Diagnosis Code    Fibular collateral ligament bursitis M70.50    Insomnia, unspecified G47.00    Raynaud's syndrome I73.00    Pure hypercholesterolemia E78.00    Essential hypertension, benign I10    Vitamin D deficiency E55.9    Chronic fatigue, unspecified R53.82    Prolonged Q-T interval on ECG R94.31    Near syncope R55    Myalgia M79.1    Hypoxia R09.02    CHF (congestive heart failure) ([facility]) I50.9       Plan:    Cont lasix gtt  I/o's  k replacement  abx's and fu cultures  Pt, ot     Case discussed with:  []Patient  []Family  []Nursing  []Case Management  DVT Prophylaxis:  []Lovenox  []Hep SQ  []SCDs  []Coumadin   []On Heparin gtt    Signed By: Greg Monk MD

## 2017-01-26 NOTE — PROGRESS NOTES
Pulmonology Progress Note    Subjective:     Is on lasix drip  No complaints     Current Facility-Administered Medications   Medication Dose Route Frequency    potassium chloride (K-DUR, KLOR-CON) SR tablet 40 mEq  40 mEq Oral TID    [START ON 1/27/2017] aspirin chewable tablet 81 mg  81 mg Oral DAILY    ALPRAZolam (XANAX) tablet 0.125 mg  0.125 mg Oral BID PRN    doxepin (SINEquan) capsule 10 mg  10 mg Oral QPM    ergocalciferol (ERGOCALCIFEROL) capsule 50,000 Units  50,000 Units Oral Q7D    estradiol (ESTRACE) tablet 1 mg  1 mg Oral DAILY    gabapentin (NEURONTIN) capsule 600 mg  600 mg Oral QHS    predniSONE (DELTASONE) tablet 10 mg  10 mg Oral DAILY WITH BREAKFAST    umeclidinium-vilanterol (ANORO ELLIPTA) 62.5 mcg- 25 mcg/inhalation  1 Puff Inhalation DAILY    acetaminophen (TYLENOL) tablet 650 mg  650 mg Oral Q4H PRN    HYDROcodone-acetaminophen (NORCO) 5-325 mg per tablet 1 Tab  1 Tab Oral Q4H PRN    naloxone (NARCAN) injection 0.4 mg  0.4 mg IntraVENous PRN    ondansetron (ZOFRAN) injection 4 mg  4 mg IntraVENous Q4H PRN    enoxaparin (LOVENOX) injection 40 mg  40 mg SubCUTAneous Q24H    levoFLOXacin (LEVAQUIN) 250 mg in D5W IVPB  250 mg IntraVENous Q24H    furosemide (LASIX) 100 mg in 0.9% sodium chloride 100 mL infusion  5 mg/hr IntraVENous CONTINUOUS    albuterol (PROVENTIL VENTOLIN) nebulizer solution 2.5 mg  2.5 mg Nebulization Q6H PRN       Review of Systems:  Pertinent items are noted in HPI.     Objective:     Visit Vitals    BP 92/53 (BP 1 Location: Left arm, BP Patient Position: At rest)    Pulse 97    Temp 97.2 °F (36.2 °C)    Resp 15    Wt 75.1 kg (165 lb 8 oz)    SpO2 91%    Breastfeeding No    BMI 28.41 kg/m2    O2 Flow Rate (L/min): 2 l/min O2 Device: Nasal cannula    01/24 1901 - 01/26 0700  In: 240 [P.O.:240]  Out: 2625 [Urine:2625]  01/26 0701 - 01/26 1900  In: 320 [P.O.:320]  Out: 950 [Urine:950]    Physical Exam:   Visit Vitals    BP 92/53 (BP 1 Location: Left arm, BP Patient Position: At rest)    Pulse 97    Temp 97.2 °F (36.2 °C)    Resp 15    Wt 75.1 kg (165 lb 8 oz)    SpO2 91%    Breastfeeding No    BMI 28.41 kg/m2     General:  Alert, cooperative, no distress, appears stated age. Head:  Normocephalic, without obvious abnormality, atraumatic. Eyes:  Conjunctivae/corneas clear. PERRL, EOMs intact. Fundi benign. Ears:  Normal TMs and external ear canals both ears. Nose: Nares normal. Septum midline. Mucosa normal. No drainage or sinus tenderness. Throat: Lips, mucosa, and tongue normal. Teeth and gums normal.   Neck: Supple, symmetrical, trachea midline, no adenopathy, thyroid: no enlargement/tenderness/nodules, no carotid bruit and no JVD. Back:   Symmetric, no curvature. ROM normal. No CVA tenderness. Lungs:   Clear to auscultation bilaterally. Chest wall:  No tenderness or deformity. Heart:  Regular rate and rhythm, S1, S2 normal, no murmur, click, rub or gallop. Breast Exam:  No tenderness, masses, or nipple abnormality. Abdomen:   Soft, non-tender. Bowel sounds normal. No masses,  No organomegaly. Genitalia:  Normal female without lesion, discharge or tenderness. Rectal:  Normal tone,  no masses or tenderness  Guaiac negative stool. Extremities:  edema decreasing with lasix. Pulses: 2+ and symmetric all extremities. Skin: Skin color, texture, turgor normal. No rashes or lesions. Lymph nodes: Cervical, supraclavicular, and axillary nodes normal.   Neurologic: CNII-XII intact. Normal strength, sensation and reflexes throughout.        Data Review:    Recent Results (from the past 24 hour(s))   METABOLIC PANEL, COMPREHENSIVE    Collection Time: 01/26/17  3:46 AM   Result Value Ref Range    Sodium 134 (L) 136 - 145 mmol/L    Potassium 2.8 (LL) 3.5 - 5.5 mmol/L    Chloride 95 (L) 100 - 108 mmol/L    CO2 26 21 - 32 mmol/L    Anion gap 13 3.0 - 18 mmol/L    Glucose 90 74 - 99 mg/dL    BUN 39 (H) 7.0 - 18 MG/DL    Creatinine 1.12 0.6 - 1.3 MG/DL    BUN/Creatinine ratio 35 (H) 12 - 20      GFR est AA 56 (L) >60 ml/min/1.73m2    GFR est non-AA 46 (L) >60 ml/min/1.73m2    Calcium 8.1 (L) 8.5 - 10.1 MG/DL    Bilirubin, total 0.5 0.2 - 1.0 MG/DL    ALT 23 13 - 56 U/L    AST 19 15 - 37 U/L    Alk. phosphatase 39 (L) 45 - 117 U/L    Protein, total 5.7 (L) 6.4 - 8.2 g/dL    Albumin 2.1 (L) 3.4 - 5.0 g/dL    Globulin 3.6 2.0 - 4.0 g/dL    A-G Ratio 0.6 (L) 0.8 - 1.7     CBC WITH AUTOMATED DIFF    Collection Time: 01/26/17  3:46 AM   Result Value Ref Range    WBC 20.6 (H) 4.6 - 13.2 K/uL    RBC 4.43 4.20 - 5.30 M/uL    HGB 12.3 12.0 - 16.0 g/dL    HCT 37.2 35.0 - 45.0 %    MCV 84.0 74.0 - 97.0 FL    MCH 27.8 24.0 - 34.0 PG    MCHC 33.1 31.0 - 37.0 g/dL    RDW 18.2 (H) 11.6 - 14.5 %    PLATELET 707 212 - 288 K/uL    MPV 9.5 9.2 - 11.8 FL    NEUTROPHILS 87 (H) 42 - 75 %    BANDS 1 0 - 5 %    LYMPHOCYTES 3 (L) 20 - 51 %    MONOCYTES 8 2 - 9 %    EOSINOPHILS 1 0 - 5 %    BASOPHILS 0 0 - 3 %    ABS. NEUTROPHILS 18.2 (H) 1.8 - 8.0 K/UL    ABS. LYMPHOCYTES 0.6 (L) 0.8 - 3.5 K/UL    ABS. MONOCYTES 1.6 (H) 0 - 1.0 K/UL    ABS. EOSINOPHILS 0.2 0.0 - 0.4 K/UL    ABS.  BASOPHILS 0.0 0.0 - 0.06 K/UL    DF MANUAL      PLATELET COMMENTS ADEQUATE PLATELETS      RBC COMMENTS ANISOCYTOSIS  1+        RBC COMMENTS POLYCHROMASIA  1+        RBC COMMENTS TARGET CELLS  2+       MAGNESIUM    Collection Time: 01/26/17  3:46 AM   Result Value Ref Range    Magnesium 1.3 (L) 1.8 - 2.4 mg/dL           Assessment:     Active Problems:    Pure hypercholesterolemia ()      Essential hypertension, benign ()      Prolonged Q-T interval on ECG (12/21/2016)      Myalgia (1/4/2017)      Hypoxia (1/12/2017)      CHF (congestive heart failure) ([facility] [address] (1/25/2017)      SOB (shortness of breath) (1/26/2017)        Plan:     Continue lasixx drip  At present patient is in right heart failure causing the hypotension and edema  After adequate diueresis, RHC and referral to pul HTN clinic

## 2017-01-26 NOTE — PROGRESS NOTES
Care Management Interventions  PCP Verified by CM: Yes  Palliative Care Consult (Criteria: CHF and RRAT>21): No  Reason for No Palliative Care Consult: Other (see comment)  Mode of Transport at Discharge: Self  Transition of Care Consult (CM Consult): Discharge Planning  MyChart Signup: No  Discharge Durable Medical Equipment: No (patient has rw, canes, O2 @ 2 lpm by [facility])  Health Maintenance Reviewed: Yes  Physical Therapy Consult: Yes  Occupational Therapy Consult: Yes  Speech Therapy Consult: No  Current Support Network: Lives Alone  Confirm Follow Up Transport: Family  Plan discussed with Pt/Family/Caregiver: Yes  Discharge Location  Discharge Placement: Rehab hospital/unit acute    Patient 80years old female admitted to [facility] with CHF. Currently lasix gtt, labs, test, cardiology consult, PT, OT baltazar. Saw the patient in room, her friends [facility] and Maame Hawkins present at bedside. Patient gave her permission to conduct this interview in their presence. Patient says she lives alone, has very supportive neighbors, friends; her son lives in [place], daughter lives in [facility]. However, patient says her children take turns and come to visit her frequently. Patient has available at home rolling walker that not in use by the her, she has cane and uses it. Patient also has O2, uses 2 lpm; O2 supplied by [facility]. DCP discussed, patient really hopes she will be able to attend ARU. Patient also was provided with snf list, in case if ARU will deny her for admission. Patient gave her verbal consent for this cm to make referral to ARU admissions liaison. CM called, spoke to Jose David Ramirez, made referral.   CM will continue to follow.  Janet Uriarte rn, cm

## 2017-01-26 NOTE — PROGRESS NOTES
[facility]      CARDIOLOGY PROGRESS NOTE  RECS:      1. Congestive heart failure, chronic diastolic dysfunction. Mainly right heart failure secondary to severe pulmonary hypertension- slowly improving on lasix drip. Added asa 81 mg daily  2. Anasarca- improving . 3. Pulmonary hypertension, which is severe. She will need  a right heart catheterization to evaluate pulmonary vasodilator drugs when stable   4  History of rheumatoid arthritis. 5. Hx hypertension- now BP running low she denies dizziness. Will monitor closely. 6. Hypokalemia-   replaced by medical team  7. Hyperlipidemia- follow lipid panel   8. Diabetes- medicine is following    Echo 12/16  SUMMARY:  Left ventricle: Systolic function was normal. Ejection fraction was  estimated in the range of 55 % to 60 %. No obvious wall motion  abnormalities identified in the views obtained. Doppler parameters were  consistent with abnormal left ventricular relaxation (grade 1 diastolic  dysfunction). Right ventricle: The ventricle was dilated. Systolic function was reduced. Estimated peak pressure was 85 mmHg. Right atrium: The atrium was dilated. Aortic valve: There was mild to moderate regurgitation. Tricuspid valve: There was moderate to severe regurgitation. Pulmonic valve: There was mild regurgitation. Pericardium: A small pericardial effusion was identified circumferential  to the heart. The fluid had no internal echoes. There was no evidence of  hemodynamic compromise.         ASSESSMENT:  Hospital Problems  Date Reviewed: 1/23/2017          Codes Class Noted POA    SOB (shortness of breath) ICD-10-CM: R06.02  ICD-9-CM: 786.05  1/26/2017 Unknown        CHF (congestive heart failure) ([facility] [address] ICD-10-CM: I50.9  ICD-9-CM: 428.0  1/25/2017 Unknown        Hypoxia ICD-10-CM: R09.02  ICD-9-CM: 799.02  1/12/2017 Yes        Myalgia ICD-10-CM: M79.1  ICD-9-CM: 729.1  1/4/2017 Yes        Prolonged Q-T interval on ECG ICD-10-CM: R94.31  ICD-9-CM: 794.31  12/21/2016 Yes        Pure hypercholesterolemia ICD-10-CM: E78.00  ICD-9-CM: 272.0  Unknown Yes        Essential hypertension, benign ICD-10-CM: I10  ICD-9-CM: 401.1  Unknown Yes                SUBJECTIVE:    No CP  Improving SOB    OBJECTIVE:    VS:   Visit Vitals    BP 92/53 (BP 1 Location: Left arm, BP Patient Position: At rest)    Pulse 97    Temp 97.2 °F (36.2 °C)    Resp 15    Wt 75.1 kg (165 lb 8 oz)    SpO2 91%    Breastfeeding No    BMI 28.41 kg/m2         Intake/Output Summary (Last 24 hours) at 01/26/17 1258  Last data filed at 01/26/17 1019   Gross per 24 hour   Intake              560 ml   Output             3575 ml   Net            -3015 ml     TELE: normal sinus rhythm    General: alert, well developed, dyspneic, pleasant and in no apparent distress  HENT: Normocephalic, atraumatic. Normal external eye. Neck :  increased JVP  Cardiac:  regular rate and rhythm, S1, S2 normal, no click, no rub  Lungs: diminished breath sounds b/l. Abdomen: Soft, nontender, no masses  Extremities:  + 2 edema ble. Labs: Results:       Chemistry Recent Labs      01/26/17   0346  01/25/17   0640   GLU  90  109*   NA  134*  135*   K  2.8*  5.2   CL  95*  96*   CO2  26  29   BUN  39*  42*   CREA  1.12  1.20   CA  8.1*  8.9   AGAP  13  10   BUCR  35*  35*   AP  39*  52   TP  5.7*  6.3*   ALB  2.1*  2.8*   GLOB  3.6  3.5   AGRAT  0.6*  0.8      CBC w/Diff Recent Labs      01/26/17   0346  01/25/17   0640   WBC  20.6*  15.3*   RBC  4.43  4.77   HGB  12.3  13.4   HCT  37.2  40.0   PLT  172  197   GRANS  87*  87*   LYMPH  3*  7*   EOS  1  0      Cardiac Enzymes Recent Labs      01/25/17   0640   CPK  19*   CKND1  13.7*      Coagulation No results for input(s): PTP, INR, APTT in the last 72 hours.     No lab exists for component: INREXT, INREXT    Lipid Panel Lab Results   Component Value Date/Time    Cholesterol, total 181 12/21/2016 11:58 PM    HDL Cholesterol 34 12/21/2016 11:58 PM LDL, calculated 128.2 12/21/2016 11:58 PM    VLDL, calculated 18.8 12/21/2016 11:58 PM    Triglyceride 94 12/21/2016 11:58 PM    CHOL/HDL Ratio 5.3 12/21/2016 11:58 PM      BNP No results for input(s): BNPP in the last 72 hours. Liver Enzymes Recent Labs      01/26/17   0346   TP  5.7*   ALB  2.1*   AP  39*   SGOT  19      Thyroid Studies Lab Results   Component Value Date/Time    TSH 0.74 12/21/2016 11:58 PM              Char White Tigist:[phone] supervised. I have independently evaluated and examined the patient. All relevant labs and testing data's are reviewed. Care plan discussed and updated after review.     Ashley Chavez MD

## 2017-01-26 NOTE — H&P
[facility] [facility]  ROUTINE H AND PS    Name:  Chiara Ugalde  MR#:  [id number]  :  11/15/1933  Account #:  [de-identified]  Date of Adm:  2017      CHIEF COMPLAINT: Shortness of breath and increasing left upper  extremity swelling. HISTORY OF PRESENT ILLNESS: The patient is an 66-year-old  female. She has a past medical history of Raynaud's  syndrome, chronic respiratory failure, hypertension. She is accompanied  by her family members. They stated that since she was discharged  here from the hospital at the end of December, she continues to have  some shortness of breath. She complains of increased swelling,  particularly in her left upper extremity. In the emergency department,  she did have an ultrasound which was negative for DVT. She states  that there is no chest pain, but she does complain of some orthopnea  and states that she has difficulty walking from her bedroom to the  bathroom without becoming short of breath. When she was here in  December, she had a transthoracic echo, which showed that she had  elevated pressures in her right ventricle, consistent with pulmonary  hypertension EF at that time was 55% to 60% and grade 1 diastolic  dysfunction. There have been no fevers. She does complain of some  weight gain. Denies any chest pain, but there is some shortness of  breath. Denies any abdominal pain. No GI blood loss. No dysuria. Denies any history of recent syncope, though she did have a syncopal  event when she was here in December. PAST MEDICAL HISTORY  1. Raynaud's syndrome. 2. Hypertension. MEDICATIONS  This is from her last discharge summary to include:  1. Xanax. 2. Doxepin. 3. Estradiol. 4. Neurontin. 5. Robitussin. 6. Hydrochlorothiazide. 7. Nifedipine. 8. Albuterol. 9. Phenergan. 10. Clinoril. 11. Vitamin D. ALLERGIES  1. Bernette Loffler. 2. SULFA. 3. PENICILLIN. SOCIAL HISTORY: Denies any alcohol or tobacco use.     FAMILY HISTORY: Heart disease. REVIEW OF SYSTEMS: See HPI, 10-point review of systems  negative. PHYSICAL EXAMINATION  VITAL SIGNS: Afebrile, pulse 93, /81, respirations 17, and  100% on room air. GENERAL: She is comfortable. HEENT: Normocephalic, atraumatic. Pupils equally round, react to light  and accommodation. Oral mucosa is dry. NECK: Supple. There is JVD. CHEST: Clear. CARDIOVASCULAR: S1, S2.  ABDOMEN: Soft. Bowel sounds present. EXTREMITIES: Show 2+ pitting edema lower extremities, as well as  1+ pitting edema of her left upper extremity. LABORATORY DATA: Reviewed. IMAGING: Chest x-ray is reviewed. ASSESSMENT  1. Congestive heart failure. 2. Right heart failure due to severe pulmonary hypertension. 3. History of Raynaud's syndrome. PLAN: She will be admitted to a telemetry bed. Pulmonary and  Cardiology have already been consulted. She will be placed on  diuretics, daily weights, strict I's and O's. I did speak with Pulmonary  and they feel that her symptoms are more consistent with right-sided  heart failure due to pulmonary hypertension. During her hospitalization,  she will be continued on her home medications.         Capri Dawn MD    [address] / [place] [address] [place]  D:  01/26/2017   09:39  T:  01/26/2017   12:26  Job #:  [id number]

## 2017-01-26 NOTE — PROGRESS NOTES
Visited with Mrs Felix who was sitting up in bed. Introduced self and role. RN at bedside, will visit again.

## 2017-01-26 NOTE — PROGRESS NOTES
1922 - Received bedside report from [address] [place] [address] , patient was resting in bed, oriented to call button. 227 [facility] bedside report to [address] [place] [address], using SBAR, MAR, and Kardex.

## 2017-01-27 NOTE — ROUTINE PROCESS
Bedside shift change report given to [address], Sw, RN (oncoming nurse) by Kael Langley RN (offgoing nurse). Report included the following information SBAR, Kardex and MAR.     1930 - Bedside shift change report given to Francia oCrdova RN (oncoming nurse) by Felicitas Anderson RN (offgoing nurse). Report included the following information SBAR, Kardex and MAR.

## 2017-01-27 NOTE — PROGRESS NOTES
[facility]      CARDIOLOGY PROGRESS NOTE  RECS:      1. Congestive heart failure, chronic diastolic dysfunction. Mainly right heart failure secondary to severe pulmonary hypertension- slowly improving continue with lasix drip and  asa 81 mg daily  2. Anasarca- improving . 3. Pulmonary hypertension, which is severe. She will need  a right heart catheterization to evaluate pulmonary vasodilator drugs when stable   4  History of rheumatoid arthritis. 5. Hx hypertension- now BP running low she denies dizziness. Will monitor closely. 6. Hypokalemia-   replaced with +K 40 meq once. Then 20 meq +K daily. F/u mag level today   7. Diabetes- medicine is following    Echo 12/16  Left ventricle: Systolic function was normal. Ejection fraction was  estimated in the range of 55 % to 60 %. No obvious wall motion  abnormalities identified in the views obtained. Doppler parameters were  consistent with abnormal left ventricular relaxation (grade 1 diastolic  dysfunction). Right ventricle: The ventricle was dilated. Systolic function was reduced. Estimated peak pressure was 85 mmHg. Right atrium: The atrium was dilated. Aortic valve: There was mild to moderate regurgitation. Tricuspid valve: There was moderate to severe regurgitation. Pulmonic valve: There was mild regurgitation. Pericardium: A small pericardial effusion was identified circumferential  to the heart. The fluid had no internal echoes. There was no evidence of  hemodynamic compromise.         ASSESSMENT:  Hospital Problems  Date Reviewed: 1/23/2017          Codes Class Noted POA    SOB (shortness of breath) ICD-10-CM: R06.02  ICD-9-CM: 786.05  1/26/2017 Unknown        [facility] failure) [facility] ICD-10-CM: I50.9  ICD-9-CM: 428.0  1/25/2017 Unknown        Hypoxia ICD-10-CM: R09.02  ICD-9-CM: 799.02  1/12/2017 Yes        Myalgia ICD-10-CM: M79.1  ICD-9-CM: 729.1  1/4/2017 Yes        Prolonged Q-T interval on ECG ICD-10-CM: R94.31  ICD-9-CM: 794.31  12/21/2016 Yes        Pure hypercholesterolemia ICD-10-CM: E78.00  ICD-9-CM: 272.0  Unknown Yes        Essential hypertension, benign ICD-10-CM: I10  ICD-9-CM: 401.1  Unknown Yes                SUBJECTIVE:    No CP  Improving SOB    OBJECTIVE:    VS:   Visit Vitals    BP 97/63 (BP 1 Location: Left arm, BP Patient Position: At rest;Supine)    Pulse 99    Temp 97.7 °F (36.5 °C)    Resp 16    Wt 75.1 kg (165 lb 8 oz)    SpO2 92%    Breastfeeding No    BMI 28.41 kg/m2         Intake/Output Summary (Last 24 hours) at 01/27/17 1158  Last data filed at 01/27/17 1004   Gross per 24 hour   Intake             1395 ml   Output             1500 ml   Net             -105 ml     TELE: normal sinus rhythm    General: alert, well developed, dyspneic, pleasant and in no apparent distress  HENT: Normocephalic, atraumatic. Normal external eye. Neck :  increased JVP  Cardiac:  regular rate and rhythm, S1, S2 normal, no click, no rub  Lungs: diminished breath sounds b/l. Abdomen: Soft, nontender, no masses  Extremities:  + 1 edema ble. Labs: Results:       Chemistry Recent Labs      01/27/17 0237 01/26/17 0346 01/25/17   0640   GLU  114*  90  109*   NA  132*  134*  135*   K  3.3*  2.8*  5.2   CL  94*  95*  96*   CO2  28  26  29   BUN  39*  39*  42*   CREA  1.12  1.12  1.20   CA  8.1*  8.1*  8.9   AGAP  10  13  10   BUCR  35*  35*  35*   AP   --   39*  52   TP   --   5.7*  6.3*   ALB   --   2.1*  2.8*   GLOB   --   3.6  3.5   AGRAT   --   0.6*  0.8      CBC w/Diff Recent Labs      01/27/17 0237 01/26/17 0346  01/25/17   0640   WBC  15.8*  20.6*  15.3*   RBC  4.17*  4.43  4.77   HGB  11.5*  12.3  13.4   HCT  34.5*  37.2  40.0   PLT  156  172  197   GRANS  90*  87*  87*   LYMPH  6*  3*  7*   EOS  0  1  0      Cardiac Enzymes Recent Labs      01/25/17   0640   CPK  19*   CKND1  13.7*      Coagulation No results for input(s): PTP, INR, APTT in the last 72 hours.     No lab exists for component: INREXT, INREXT    Lipid Panel Lab Results   Component Value Date/Time    Cholesterol, total 133 01/27/2017 02:37 AM    HDL Cholesterol 34 01/27/2017 02:37 AM    LDL, calculated 83.8 01/27/2017 02:37 AM    VLDL, calculated 15.2 01/27/2017 02:37 AM    Triglyceride 76 01/27/2017 02:37 AM    CHOL/HDL Ratio 3.9 01/27/2017 02:37 AM      BNP No results for input(s): BNPP in the last 72 hours. Liver Enzymes Recent Labs      01/26/17   0346   TP  5.7*   ALB  2.1*   AP  39*   SGOT  19      Thyroid Studies Lab Results   Component Value Date/Time    TSH 0.74 12/21/2016 11:58 PM              Char White Tigist:[phone] supervised. I have independently evaluated and examined the patient. All relevant labs and testing data's are reviewed. Care plan discussed and updated after review.     Geo Monaco MD

## 2017-01-27 NOTE — PROGRESS NOTES
1916 - Received bedside report from Christy GONZALES, pt was resting in bed, oriented pt to call button. [id number] - Gave bedside report to [address] [place] [address], using SBAR, MAR, and Kardex.

## 2017-01-27 NOTE — PROGRESS NOTES
[facility]  Progress Note    Patient: Magnolia Vasquez Age: 80 y.o. : 11/15/1933 MR#: [id number] SSN: [id number]  Date/Time: 2017 12:00 PM    Subjective:     lue edema less, no cp or sob, family wishes aru    Objective:   VS:   Visit Vitals    BP 97/63 (BP 1 Location: Left arm, BP Patient Position: At rest;Supine)    Pulse 99    Temp 97.7 °F (36.5 °C)    Resp 16    Wt 75.1 kg (165 lb 8 oz)    SpO2 92%    Breastfeeding No    BMI 28.41 kg/m2      Tmax/24hrs: Temp (24hrs), Av.9 °F (36.6 °C), Min:97.3 °F (36.3 °C), Max:98.5 °F (36.9 °C)     Input/Output:     Intake/Output Summary (Last 24 hours) at 17 1216  Last data filed at 17 1004   Gross per 24 hour   Intake             1395 ml   Output             1500 ml   Net             -105 ml       General:  nad  Cardiovascular:  s1s2  Pulmonary:  clear  GI:  benign  Extremities:  1+ le edema  Additional:  No skin changes    Labs:    Recent Results (from the past 24 hour(s))   METABOLIC PANEL, BASIC    Collection Time: 17  2:37 AM   Result Value Ref Range    Sodium 132 (L) 136 - 145 mmol/L    Potassium 3.3 (L) 3.5 - 5.5 mmol/L    Chloride 94 (L) 100 - 108 mmol/L    CO2 28 21 - 32 mmol/L    Anion gap 10 3.0 - 18 mmol/L    Glucose 114 (H) 74 - 99 mg/dL    BUN 39 (H) 7.0 - 18 MG/DL    Creatinine 1.12 0.6 - 1.3 MG/DL    BUN/Creatinine ratio 35 (H) 12 - 20      GFR est AA 56 (L) >60 ml/min/1.73m2    GFR est non-AA 46 (L) >60 ml/min/1.73m2    Calcium 8.1 (L) 8.5 - 10.1 MG/DL   CBC WITH AUTOMATED DIFF    Collection Time: 17  2:37 AM   Result Value Ref Range    WBC 15.8 (H) 4.6 - 13.2 K/uL    RBC 4.17 (L) 4.20 - 5.30 M/uL    HGB 11.5 (L) 12.0 - 16.0 g/dL    HCT 34.5 (L) 35.0 - 45.0 %    MCV 82.7 74.0 - 97.0 FL    MCH 27.6 24.0 - 34.0 PG    MCHC 33.3 31.0 - 37.0 g/dL    RDW 17.9 (H) 11.6 - 14.5 %    PLATELET 111 541 - 961 K/uL    MPV 9.2 9.2 - 11.8 FL    NEUTROPHILS 90 (H) 42 - 75 %    LYMPHOCYTES 6 (L) 20 - 51 %    MONOCYTES 4 2 - 9 %    EOSINOPHILS 0 0 - 5 %    BASOPHILS 0 0 - 3 %    ABS. NEUTROPHILS 14.3 (H) 1.8 - 8.0 K/UL    ABS. LYMPHOCYTES 0.9 0.8 - 3.5 K/UL    ABS. MONOCYTES 0.6 0 - 1.0 K/UL    ABS. EOSINOPHILS 0.0 0.0 - 0.4 K/UL    ABS.  BASOPHILS 0.0 0.0 - 0.06 K/UL    DF MANUAL      PLATELET COMMENTS ADEQUATE PLATELETS      RBC COMMENTS ANISOCYTOSIS  1+        RBC COMMENTS TARGET CELLS  1+       LIPID PANEL    Collection Time: 01/27/17  2:37 AM   Result Value Ref Range    LIPID PROFILE          Cholesterol, total 133 <200 MG/DL    Triglyceride 76 <150 MG/DL    HDL Cholesterol 34 (L) 40 - 60 MG/DL    LDL, calculated 83.8 0 - 100 MG/DL    VLDL, calculated 15.2 MG/DL    CHOL/HDL Ratio 3.9 0 - 5.0       Additional Data Reviewed:      Current Facility-Administered Medications   Medication Dose Route Frequency    potassium chloride (K-DUR, KLOR-CON) SR tablet 40 mEq  40 mEq Oral NOW    [START ON 1/28/2017] potassium chloride (K-DUR, KLOR-CON) SR tablet 20 mEq  20 mEq Oral DAILY    aspirin chewable tablet 81 mg  81 mg Oral DAILY    ALPRAZolam (XANAX) tablet 0.125 mg  0.125 mg Oral BID PRN    doxepin (SINEquan) capsule 10 mg  10 mg Oral QPM    ergocalciferol (ERGOCALCIFEROL) capsule 50,000 Units  50,000 Units Oral Q7D    estradiol (ESTRACE) tablet 1 mg  1 mg Oral DAILY    gabapentin (NEURONTIN) capsule 600 mg  600 mg Oral QHS    predniSONE (DELTASONE) tablet 10 mg  10 mg Oral DAILY WITH BREAKFAST    umeclidinium-vilanterol (ANORO ELLIPTA) 62.5 mcg- 25 mcg/inhalation  1 Puff Inhalation DAILY    acetaminophen (TYLENOL) tablet 650 mg  650 mg Oral Q4H PRN    HYDROcodone-acetaminophen (NORCO) 5-325 mg per tablet 1 Tab  1 Tab Oral Q4H PRN    naloxone (NARCAN) injection 0.4 mg  0.4 mg IntraVENous PRN    ondansetron (ZOFRAN) injection 4 mg  4 mg IntraVENous Q4H PRN    enoxaparin (LOVENOX) injection 40 mg  40 mg SubCUTAneous Q24H    levoFLOXacin (LEVAQUIN) 250 mg in D5W IVPB  250 mg IntraVENous Q24H    furosemide (LASIX) 100 mg in 0.9% sodium chloride 100 mL infusion  5 mg/hr IntraVENous CONTINUOUS    albuterol (PROVENTIL VENTOLIN) nebulizer solution 2.5 mg  2.5 mg Nebulization Q6H PRN       Assessment/Plan:     Patient Active Problem List   Diagnosis Code    Fibular collateral ligament bursitis M70.50    Insomnia, unspecified G47.00    Raynaud's syndrome I73.00    Pure hypercholesterolemia E78.00    Essential hypertension, benign I10    Vitamin D deficiency E55.9    Chronic fatigue, unspecified R53.82    Prolonged Q-T interval on ECG R94.31    Near syncope R55    Myalgia M79.1    Hypoxia R09.02    CHF (congestive heart failure) (HCC) I50.9    SOB (shortness of breath) R06.02       Plan:    Cont lasix gtt  I/o's  k replacement  abx's and fu cultures  Pt, ot  aru eval     Case discussed with:  []Patient  []Family  []Nursing  []Case Management  DVT Prophylaxis:  []Lovenox  []Hep SQ  []SCDs  []Coumadin   []On Heparin gtt    Signed By: Leslie Litten, MD

## 2017-01-27 NOTE — PROGRESS NOTES
Pulmonology Progress Note    Subjective:     Is on lasix drip  No complaints   I&O -ve 1.5L    Current Facility-Administered Medications   Medication Dose Route Frequency    aspirin chewable tablet 81 mg  81 mg Oral DAILY    ALPRAZolam (XANAX) tablet 0.125 mg  0.125 mg Oral BID PRN    doxepin (SINEquan) capsule 10 mg  10 mg Oral QPM    ergocalciferol (ERGOCALCIFEROL) capsule 50,000 Units  50,000 Units Oral Q7D    estradiol (ESTRACE) tablet 1 mg  1 mg Oral DAILY    gabapentin (NEURONTIN) capsule 600 mg  600 mg Oral QHS    predniSONE (DELTASONE) tablet 10 mg  10 mg Oral DAILY WITH BREAKFAST    umeclidinium-vilanterol (ANORO ELLIPTA) 62.5 mcg- 25 mcg/inhalation  1 Puff Inhalation DAILY    acetaminophen (TYLENOL) tablet 650 mg  650 mg Oral Q4H PRN    HYDROcodone-acetaminophen (NORCO) 5-325 mg per tablet 1 Tab  1 Tab Oral Q4H PRN    naloxone (NARCAN) injection 0.4 mg  0.4 mg IntraVENous PRN    ondansetron (ZOFRAN) injection 4 mg  4 mg IntraVENous Q4H PRN    enoxaparin (LOVENOX) injection 40 mg  40 mg SubCUTAneous Q24H    levoFLOXacin (LEVAQUIN) 250 mg in D5W IVPB  250 mg IntraVENous Q24H    furosemide (LASIX) 100 mg in 0.9% sodium chloride 100 mL infusion  5 mg/hr IntraVENous CONTINUOUS    albuterol (PROVENTIL VENTOLIN) nebulizer solution 2.5 mg  2.5 mg Nebulization Q6H PRN       Review of Systems:  Pertinent items are noted in HPI.     Objective:     Visit Vitals    BP 95/61 (BP 1 Location: Left arm, BP Patient Position: At rest;Supine)    Pulse 88    Temp 97.3 °F (36.3 °C)    Resp 20    Wt 75.1 kg (165 lb 8 oz)    SpO2 90%    Breastfeeding No    BMI 28.41 kg/m2    O2 Flow Rate (L/min): 2 l/min O2 Device: Nasal cannula    01/25 1901 - 01/27 0700  In: 0810 [P.O.:1365; I.V.:60]  Out: 7517 [Urine:3475]  01/27 0701 - 01/27 1900  In: 530 [P.O.:480; I.V.:50]  Out: -     Physical Exam:   Visit Vitals    BP 95/61 (BP 1 Location: Left arm, BP Patient Position: At rest;Supine)    Pulse 88    Temp 97.3 °F (36.3 °C)    Resp 20    Wt 75.1 kg (165 lb 8 oz)    SpO2 90%    Breastfeeding No    BMI 28.41 kg/m2     General:  Alert, cooperative, no distress, appears stated age. Head:  Normocephalic, without obvious abnormality, atraumatic. Eyes:  Conjunctivae/corneas clear. PERRL, EOMs intact. Fundi benign. Ears:  Normal TMs and external ear canals both ears. Nose: Nares normal. Septum midline. Mucosa normal. No drainage or sinus tenderness. Throat: Lips, mucosa, and tongue normal. Teeth and gums normal.   Neck: Supple, symmetrical, trachea midline, no adenopathy, thyroid: no enlargement/tenderness/nodules, no carotid bruit and no JVD. Back:   Symmetric, no curvature. ROM normal. No CVA tenderness. Lungs:   Clear to auscultation bilaterally. Chest wall:  No tenderness or deformity. Heart:  Regular rate and rhythm, S1, S2 normal, no murmur, click, rub or gallop. Breast Exam:  No tenderness, masses, or nipple abnormality. Abdomen:   Soft, non-tender. Bowel sounds normal. No masses,  No organomegaly. Genitalia:  Normal female without lesion, discharge or tenderness. Rectal:  Normal tone,  no masses or tenderness  Guaiac negative stool. Extremities:  edema decreasing with lasix. Pulses: 2+ and symmetric all extremities. Skin: Skin color, texture, turgor normal. No rashes or lesions. Lymph nodes: Cervical, supraclavicular, and axillary nodes normal.   Neurologic: CNII-XII intact. Normal strength, sensation and reflexes throughout.        Data Review:    Recent Results (from the past 24 hour(s))   CULTURE, URINE    Collection Time: 01/26/17 11:50 AM   Result Value Ref Range    Special Requests: NO SPECIAL REQUESTS      Culture result: NO GROWTH AFTER 21 HOURS     METABOLIC PANEL, BASIC    Collection Time: 01/27/17  2:37 AM   Result Value Ref Range    Sodium 132 (L) 136 - 145 mmol/L    Potassium 3.3 (L) 3.5 - 5.5 mmol/L    Chloride 94 (L) 100 - 108 mmol/L    CO2 28 21 - 32 mmol/L    Anion gap 10 3.0 - 18 mmol/L    Glucose 114 (H) 74 - 99 mg/dL    BUN 39 (H) 7.0 - 18 MG/DL    Creatinine 1.12 0.6 - 1.3 MG/DL    BUN/Creatinine ratio 35 (H) 12 - 20      GFR est AA 56 (L) >60 ml/min/1.73m2    GFR est non-AA 46 (L) >60 ml/min/1.73m2    Calcium 8.1 (L) 8.5 - 10.1 MG/DL   CBC WITH AUTOMATED DIFF    Collection Time: 01/27/17  2:37 AM   Result Value Ref Range    WBC 15.8 (H) 4.6 - 13.2 K/uL    RBC 4.17 (L) 4.20 - 5.30 M/uL    HGB 11.5 (L) 12.0 - 16.0 g/dL    HCT 34.5 (L) 35.0 - 45.0 %    MCV 82.7 74.0 - 97.0 FL    MCH 27.6 24.0 - 34.0 PG    MCHC 33.3 31.0 - 37.0 g/dL    RDW 17.9 (H) 11.6 - 14.5 %    PLATELET 863 616 - 418 K/uL    MPV 9.2 9.2 - 11.8 FL    NEUTROPHILS 90 (H) 42 - 75 %    LYMPHOCYTES 6 (L) 20 - 51 %    MONOCYTES 4 2 - 9 %    EOSINOPHILS 0 0 - 5 %    BASOPHILS 0 0 - 3 %    ABS. NEUTROPHILS 14.3 (H) 1.8 - 8.0 K/UL    ABS. LYMPHOCYTES 0.9 0.8 - 3.5 K/UL    ABS. MONOCYTES 0.6 0 - 1.0 K/UL    ABS. EOSINOPHILS 0.0 0.0 - 0.4 K/UL    ABS.  BASOPHILS 0.0 0.0 - 0.06 K/UL    DF MANUAL      PLATELET COMMENTS ADEQUATE PLATELETS      RBC COMMENTS ANISOCYTOSIS  1+        RBC COMMENTS TARGET CELLS  1+       LIPID PANEL    Collection Time: 01/27/17  2:37 AM   Result Value Ref Range    LIPID PROFILE          Cholesterol, total 133 <200 MG/DL    Triglyceride 76 <150 MG/DL    HDL Cholesterol 34 (L) 40 - 60 MG/DL    LDL, calculated 83.8 0 - 100 MG/DL    VLDL, calculated 15.2 MG/DL    CHOL/HDL Ratio 3.9 0 - 5.0             Assessment:     Active Problems:    Pure hypercholesterolemia ()      Essential hypertension, benign ()      Prolonged Q-T interval on ECG (12/21/2016)      Myalgia (1/4/2017)      Hypoxia (1/12/2017)      CHF (congestive heart failure) ([facility] [address] (1/25/2017)      SOB (shortness of breath) (1/26/2017)        Plan:     diuertics as per cardiology  W/u of pul htn including right heart cath can be done as outpatient  Will need referral to Pul HTN clinic  Will sign off at this time

## 2017-01-27 NOTE — PROGRESS NOTES
ARU/IPR REFERRAL CONTACT NOTE  [address] [place] [address] Physical Rehabilitation    RE: [facility]    Referral received to review this patient's case for admission to [address] [place] [address] Physical [facility]. Current status reviewed.  When appropriate, will need OT evaluation/treatment on this patient to complete the pre-admission evaluation.  Will continue to follow. Thank you for this referral.  Should you have any questions please do not hesitate to call. Sincerely,  Eliel Orlando.  [address] [place] [address] Physical Rehabilitation  [phone]

## 2017-01-27 NOTE — PROGRESS NOTES
conducted an initial consultation and Spiritual Assessment for Sayra Felix, who is a 80 y.o.,female. Patients Primary Language is: [place]. According to the patients EMR Mu-ism Affiliation is: [facility].     The reason the Patient came to the hospital is:   Patient Active Problem List    Diagnosis Date Noted    SOB (shortness of breath) 01/26/2017    CHF (congestive heart failure) ([facility] [address] 01/25/2017    Hypoxia 01/12/2017    Myalgia 01/04/2017    Prolonged Q-T interval on ECG 12/21/2016    Near syncope 12/21/2016    Chronic fatigue, unspecified 07/23/2016    Vitamin D deficiency 02/24/2016    Fibular collateral ligament bursitis     Insomnia, unspecified     Raynaud's syndrome     Pure hypercholesterolemia     Essential hypertension, benign         The  provided the following Interventions:  Initiated a relationship of care and support. Explored issues of sushil, belief, spirituality and Yazidi/ritual needs while hospitalized. Listened empathically. Provided information about Spiritual Care Services. Offered prayer and assurance of continued prayers on patient's behalf. Chart reviewed. The following outcomes where achieved:  Patient shared limited information about both their medical narrative and spiritual journey/beliefs.  confirmed Patient's Mu-ism Affiliation. Patient processed feeling about current hospitalization. Patient expressed gratitude for 's visit. Assessment:  Patient does not have any Yazidi/cultural needs that will affect patients preferences in health care. There are no spiritual or Yazidi issues which require intervention at this time. Plan:  Chaplains will continue to follow and will provide pastoral care on an as needed/requested basis.  recommends bedside caregivers page  on duty if patient shows signs of acute spiritual or emotional distress.       [address] [phone]

## 2017-01-27 NOTE — PROGRESS NOTES
Problem: Self Care Deficits Care Plan (Adult)  Goal: *Acute Goals and Plan of Care (Insert Text)  Occupational Therapy Goals  Initiated 1/27/2017 within 7 day(s). 1. Patient will perform lower body dressing with modified independence and no increased SOB  2. Patient will perform bathing with modified independence and no increased SOB  3. Patient will perform proper breathing/pacing techniques independently in preparation for her efficient participation in her self care  4. Patient will demonstrate independence with scapular strengthening and setting program  Outcome: Progressing Towards Goal  OCCUPATIONAL THERAPY EVALUATION     Patient: Xochilt Curtis (80 y.o. female)  Date: 1/27/2017  Primary Diagnosis: CHF (congestive heart failure) ([facility])  CHF (congestive heart failure) ([facility]   Precautions:  Fall      ASSESSMENT :  Based on the objective data described below, the patient presents with SOB with all activity. Patient will benefit from skilled intervention to address the above impairments.   Patients rehabilitation potential is considered to be Good  Factors which may influence rehabilitation potential include:   [ ]             None noted  [ ]             Mental ability/status  [ ]             Medical condition  [ ]             Home/family situation and support systems  [ ]             Safety awareness  [ ]             Pain tolerance/management  [ ]             Other:        PLAN :  Recommendations and Planned Interventions:  [ ]               Self Care Training                  [ ]        Therapeutic Activities  [ ]               Functional Mobility Training    [ ]        Cognitive Retraining  [ ]               Therapeutic Exercises           [ ]        Endurance Activities  [ ]               Balance Training                   [ ]        Neuromuscular Re-Education  [ ]               Visual/Perceptual Training     [ ]   Home Safety Training  [ ]               Patient Education                 [ ] Family Training/Education  [ ]               Other (comment):     Frequency/Duration: Patient will be followed by occupational therapy 1-2 times per day/4-7 days per week to address goals. Discharge Recommendations: Xiang Wolfe  Further Equipment Recommendations for Discharge: N/A       PATIENT COMPLEXITY      Eval Complexity: History: MEDIUM Complexity : Expanded review of history including physical, cognitive and psychosocial  history ; Examination: MEDIUM Complexity : 3-5 performance deficits relating to physical, cognitive , or psychosocial skils that result in activity limitations and / or participation restrictions; Decision Making:MEDIUM Complexity : Patient may present with comorbidities that affect occupational performnce. Miniml to moderate modification of tasks or assistance (eg, physical or verbal ) with assesment(s) is necessary to enable patient to complete evaluation  Assessment: Medium Complexity       G-CODES:      Self Care  Current  CL= 60-79%   Goal  CJ= 20-39%. The severity rating is based on the Level of Assistance required for Functional Mobility and ADLs. SUBJECTIVE:   Patient stated I need to breathe.       OBJECTIVE DATA SUMMARY:       Past Medical History   Diagnosis Date    Acute bronchitis      Acute upper respiratory infections of unspecified site      Essential hypertension, benign      Fibular collateral ligament bursitis      Insomnia, unspecified      Myalgia and myositis, unspecified      Pure hypercholesterolemia      Raynaud's syndrome       Past Surgical History   Procedure Laterality Date    Hx hysterectomy        Hx appendectomy        Hx cholecystectomy        Hx bladder suspension        Hx other surgical Right         foot surgery     Prior Level of Function/Home Situation: she has supportive neighbors  Home Situation  Home Environment: Private residence  # Steps to Enter: 3  Rails to Enter: Yes  Hand Rails : Right  Wheelchair Ramp: No  One/Two Story Residence: One story  Living Alone: Yes  Support Systems: Child(kiran), Friends \ neighbors  Patient Expects to be Discharged to[de-identified] Private residence  Current DME Used/Available at Home: Raza Amabile, straight, Lunette Leghorn, rollator  [X]  Right hand dominant          [ ]  Left hand dominant  Cognitive/Behavioral Status:  Neurologic State: Alert  Orientation Level: Oriented X4  Skin: no skin integrity issues noted  Edema: no extremity edema noted  Vision/Perceptual:     tracking is WFL      Coordination:   BUE's WFL    Balance:   CG standing with verbal cues for proper breathing techniques  Strength:  BUE's: 4-/5   Tone & Sensation:  BUE's WFL   Range of Motion:  BUE's AROM WFL   Functional Mobility and Transfers for ADLs:  Bed Mobility:   min assist and moderate cues for proper breathing/pacing   Transfers:   min assist and moderate cues for proper breathing/pacing   ADL Assessment:   self feeding: cues for pacing  Grooming: set up secondary to decreased standing  UB bathing/dressing: min assist and moderate verbal cues for pacing and proper breathing  LB bathing/dressing: mod assist  ADL Intervention:   she was trained in modified proper breathing and pacing and following her participation in this, she increased to being able to independently actively correct her SOB. She is pleased with this progress and with repetition, she increased to independent and requiring minimal verbal cues as to pacing and timing of using this technique. Therapeutic Exercise:  Scapular setting and strengthening program, tolerance is 5 repetitions and following training, she is independent return demonstration and she reports she will follow through  Pain:  0/10 pain prior to OT session  0/10 pain following OT session  Activity Tolerance:   She has SOB with talking and it increases with all UE AROM  Please refer to the flowsheet for vital signs taken during this treatment.   After treatment:   [ ] Patient left in no apparent distress sitting up in chair  [X] Patient left in no apparent distress in bed  [X] Call bell left within reach  [ ] Nursing notified  [X] visitor present  [ ] Bed alarm activated      COMMUNICATION/EDUCATION:   [ ] Home safety education was provided and the patient/caregiver indicated understanding. [ ] Patient/family have participated as able in goal setting and plan of care. [X] Patient/family agree to work toward stated goals and plan of care. [ ] Patient understands intent and goals of therapy, but is neutral about his/her participation. [ ] Patient is unable to participate in goal setting and plan of care.      Thank you for this referral.  Chase Fulton, OTR/L  Time Calculation: 39 mins

## 2017-01-28 NOTE — PROGRESS NOTES
Pulmonology Progress Note    Subjective:     Is on lasix drip  No complaints   I&O -ve 1.5L    Current Facility-Administered Medications   Medication Dose Route Frequency    potassium chloride (K-DUR, KLOR-CON) SR tablet 20 mEq  20 mEq Oral DAILY    aspirin chewable tablet 81 mg  81 mg Oral DAILY    ALPRAZolam (XANAX) tablet 0.125 mg  0.125 mg Oral BID PRN    doxepin (SINEquan) capsule 10 mg  10 mg Oral QPM    ergocalciferol (ERGOCALCIFEROL) capsule 50,000 Units  50,000 Units Oral Q7D    estradiol (ESTRACE) tablet 1 mg  1 mg Oral DAILY    gabapentin (NEURONTIN) capsule 600 mg  600 mg Oral QHS    umeclidinium-vilanterol (ANORO ELLIPTA) 62.5 mcg- 25 mcg/inhalation  1 Puff Inhalation DAILY    acetaminophen (TYLENOL) tablet 650 mg  650 mg Oral Q4H PRN    HYDROcodone-acetaminophen (NORCO) 5-325 mg per tablet 1 Tab  1 Tab Oral Q4H PRN    naloxone (NARCAN) injection 0.4 mg  0.4 mg IntraVENous PRN    ondansetron (ZOFRAN) injection 4 mg  4 mg IntraVENous Q4H PRN    enoxaparin (LOVENOX) injection 40 mg  40 mg SubCUTAneous Q24H    levoFLOXacin (LEVAQUIN) 250 mg in D5W IVPB  250 mg IntraVENous Q24H    furosemide (LASIX) 100 mg in 0.9% sodium chloride 100 mL infusion  5 mg/hr IntraVENous CONTINUOUS    albuterol (PROVENTIL VENTOLIN) nebulizer solution 2.5 mg  2.5 mg Nebulization Q6H PRN       Review of Systems:  Pertinent items are noted in HPI.     Objective:     Visit Vitals    BP 97/59 (BP 1 Location: Right arm, BP Patient Position: At rest)    Pulse 99    Temp 97.4 °F (36.3 °C)    Resp 18    Wt 74.1 kg (163 lb 5.8 oz)    SpO2 92%    Breastfeeding No    BMI 28.04 kg/m2    O2 Flow Rate (L/min): 2 l/min O2 Device: Nasal cannula    01/26 1901 - 01/28 0700  In: 0534 [P.O.:1165; I.V.:230]  Out: 2300 [Urine:2300]  01/28 0701 - 01/28 1900  In: -   Out: 125 [Urine:125]    Physical Exam:   Visit Vitals    BP 97/59 (BP 1 Location: Right arm, BP Patient Position: At rest)    Pulse 99    Temp 97.4 °F (36.3 °C)  Resp 18    Wt 74.1 kg (163 lb 5.8 oz)    SpO2 92%    Breastfeeding No    BMI 28.04 kg/m2     General:  Alert, cooperative, no distress, appears stated age. Head:  Normocephalic, without obvious abnormality, atraumatic. Eyes:  Conjunctivae/corneas clear. PERRL, EOMs intact. Fundi benign. Ears:  Normal TMs and external ear canals both ears. Nose: Nares normal. Septum midline. Mucosa normal. No drainage or sinus tenderness. Throat: Lips, mucosa, and tongue normal. Teeth and gums normal.   Neck: Supple, symmetrical, trachea midline, no adenopathy, thyroid: no enlargement/tenderness/nodules, no carotid bruit and no JVD. Back:   Symmetric, no curvature. ROM normal. No CVA tenderness. Lungs:   Clear to auscultation bilaterally. Chest wall:  No tenderness or deformity. Heart:  Regular rate and rhythm, S1, S2 normal, no murmur, click, rub or gallop. Breast Exam:  No tenderness, masses, or nipple abnormality. Abdomen:   Soft, non-tender. Bowel sounds normal. No masses,  No organomegaly. Genitalia:  Normal female without lesion, discharge or tenderness. Rectal:  Normal tone,  no masses or tenderness  Guaiac negative stool. Extremities:  edema decreasing with lasix. Pulses: 2+ and symmetric all extremities. Skin: Skin color, texture, turgor normal. No rashes or lesions. Lymph nodes: Cervical, supraclavicular, and axillary nodes normal.   Neurologic: CNII-XII intact. Normal strength, sensation and reflexes throughout.        Data Review:    Recent Results (from the past 24 hour(s))   MAGNESIUM    Collection Time: 01/27/17  1:30 PM   Result Value Ref Range    Magnesium 1.6 (L) 1.8 - 2.4 mg/dL   METABOLIC PANEL, BASIC    Collection Time: 01/28/17  2:48 AM   Result Value Ref Range    Sodium 133 (L) 136 - 145 mmol/L    Potassium 4.2 3.5 - 5.5 mmol/L    Chloride 93 (L) 100 - 108 mmol/L    CO2 29 21 - 32 mmol/L    Anion gap 11 3.0 - 18 mmol/L    Glucose 110 (H) 74 - 99 mg/dL    BUN 41 (H) 7.0 - 18 MG/DL    Creatinine 1.35 (H) 0.6 - 1.3 MG/DL    BUN/Creatinine ratio 30 (H) 12 - 20      GFR est AA 45 (L) >60 ml/min/1.73m2    GFR est non-AA 37 (L) >60 ml/min/1.73m2    Calcium 8.2 (L) 8.5 - 10.1 MG/DL   CBC WITH AUTOMATED DIFF    Collection Time: 01/28/17  2:48 AM   Result Value Ref Range    WBC 16.2 (H) 4.6 - 13.2 K/uL    RBC 4.39 4.20 - 5.30 M/uL    HGB 12.2 12.0 - 16.0 g/dL    HCT 36.8 35.0 - 45.0 %    MCV 83.8 74.0 - 97.0 FL    MCH 27.8 24.0 - 34.0 PG    MCHC 33.2 31.0 - 37.0 g/dL    RDW 18.1 (H) 11.6 - 14.5 %    PLATELET 917 966 - 448 K/uL    MPV 9.6 9.2 - 11.8 FL    NEUTROPHILS 86 (H) 40 - 73 %    LYMPHOCYTES 6 (L) 21 - 52 %    MONOCYTES 8 3 - 10 %    EOSINOPHILS 0 0 - 5 %    BASOPHILS 0 0 - 2 %    ABS. NEUTROPHILS 14.1 (H) 1.8 - 8.0 K/UL    ABS. LYMPHOCYTES 0.9 0.9 - 3.6 K/UL    ABS. MONOCYTES 1.2 0.05 - 1.2 K/UL    ABS. EOSINOPHILS 0.0 0.0 - 0.4 K/UL    ABS.  BASOPHILS 0.0 0.0 - 0.1 K/UL    DF AUTOMATED             Assessment:     Active Problems:    Pure hypercholesterolemia ()      Essential hypertension, benign ()      Prolonged Q-T interval on ECG (12/21/2016)      Myalgia (1/4/2017)      Hypoxia (1/12/2017)      CHF (congestive heart failure) ([facility] [address] (1/25/2017)      SOB (shortness of breath) (1/26/2017)        Plan:     diuertics as per cardiology  W/u of pul htn including right heart cath can be done as outpatient  Will need referral to Pul HTN clinic

## 2017-01-28 NOTE — PROGRESS NOTES
[facility] Hospitalist Group  Progress Note    Patient: Porfirio Johnson Age: 80 y.o. : 11/15/1933 MR#: [id number] SSN: [id number]  Date/Time: 2017 12:00 PM    Subjective:     Remains on lasix gtt, edema less    Objective:   VS:   Visit Vitals    BP 94/61    Pulse 95    Temp 96.5 °F (35.8 °C)    Resp 18    Wt 74.1 kg (163 lb 5.8 oz)    SpO2 90%    Breastfeeding No    BMI 28.04 kg/m2      Tmax/24hrs: Temp (24hrs), Av.2 °F (36.2 °C), Min:96.5 °F (35.8 °C), Max:97.7 °F (36.5 °C)     Input/Output:     Intake/Output Summary (Last 24 hours) at 17 0799  Last data filed at 17 0636   Gross per 24 hour   Intake             1010 ml   Output             1450 ml   Net             -440 ml       General:  nad  Cardiovascular:  s1s2  Pulmonary:  clear  GI:  benign  Extremities:  1+ le edema  Additional:  No skin changes    Labs:    Recent Results (from the past 24 hour(s))   MAGNESIUM    Collection Time: 17  1:30 PM   Result Value Ref Range    Magnesium 1.6 (L) 1.8 - 2.4 mg/dL   METABOLIC PANEL, BASIC    Collection Time: 17  2:48 AM   Result Value Ref Range    Sodium 133 (L) 136 - 145 mmol/L    Potassium 4.2 3.5 - 5.5 mmol/L    Chloride 93 (L) 100 - 108 mmol/L    CO2 29 21 - 32 mmol/L    Anion gap 11 3.0 - 18 mmol/L    Glucose 110 (H) 74 - 99 mg/dL    BUN 41 (H) 7.0 - 18 MG/DL    Creatinine 1.35 (H) 0.6 - 1.3 MG/DL    BUN/Creatinine ratio 30 (H) 12 - 20      GFR est AA 45 (L) >60 ml/min/1.73m2    GFR est non-AA 37 (L) >60 ml/min/1.73m2    Calcium 8.2 (L) 8.5 - 10.1 MG/DL   CBC WITH AUTOMATED DIFF    Collection Time: 17  2:48 AM   Result Value Ref Range    WBC 16.2 (H) 4.6 - 13.2 K/uL    RBC 4.39 4.20 - 5.30 M/uL    HGB 12.2 12.0 - 16.0 g/dL    HCT 36.8 35.0 - 45.0 %    MCV 83.8 74.0 - 97.0 FL    MCH 27.8 24.0 - 34.0 PG    MCHC 33.2 31.0 - 37.0 g/dL    RDW 18.1 (H) 11.6 - 14.5 %    PLATELET 717 120 - 112 K/uL    MPV 9.6 9.2 - 11.8 FL    NEUTROPHILS 86 (H) 40 - 73 %    LYMPHOCYTES 6 (L) 21 - 52 %    MONOCYTES 8 3 - 10 %    EOSINOPHILS 0 0 - 5 %    BASOPHILS 0 0 - 2 %    ABS. NEUTROPHILS 14.1 (H) 1.8 - 8.0 K/UL    ABS. LYMPHOCYTES 0.9 0.9 - 3.6 K/UL    ABS. MONOCYTES 1.2 0.05 - 1.2 K/UL    ABS. EOSINOPHILS 0.0 0.0 - 0.4 K/UL    ABS.  BASOPHILS 0.0 0.0 - 0.1 K/UL    DF AUTOMATED       Additional Data Reviewed:      Current Facility-Administered Medications   Medication Dose Route Frequency    potassium chloride (K-DUR, KLOR-CON) SR tablet 20 mEq  20 mEq Oral DAILY    potassium chloride (K-DUR, KLOR-CON) SR tablet 40 mEq  40 mEq Oral BID    aspirin chewable tablet 81 mg  81 mg Oral DAILY    ALPRAZolam (XANAX) tablet 0.125 mg  0.125 mg Oral BID PRN    doxepin (SINEquan) capsule 10 mg  10 mg Oral QPM    ergocalciferol (ERGOCALCIFEROL) capsule 50,000 Units  50,000 Units Oral Q7D    estradiol (ESTRACE) tablet 1 mg  1 mg Oral DAILY    gabapentin (NEURONTIN) capsule 600 mg  600 mg Oral QHS    predniSONE (DELTASONE) tablet 10 mg  10 mg Oral DAILY WITH BREAKFAST    umeclidinium-vilanterol (ANORO ELLIPTA) 62.5 mcg- 25 mcg/inhalation  1 Puff Inhalation DAILY    acetaminophen (TYLENOL) tablet 650 mg  650 mg Oral Q4H PRN    HYDROcodone-acetaminophen (NORCO) 5-325 mg per tablet 1 Tab  1 Tab Oral Q4H PRN    naloxone (NARCAN) injection 0.4 mg  0.4 mg IntraVENous PRN    ondansetron (ZOFRAN) injection 4 mg  4 mg IntraVENous Q4H PRN    enoxaparin (LOVENOX) injection 40 mg  40 mg SubCUTAneous Q24H    levoFLOXacin (LEVAQUIN) 250 mg in D5W IVPB  250 mg IntraVENous Q24H    furosemide (LASIX) 100 mg in 0.9% sodium chloride 100 mL infusion  5 mg/hr IntraVENous CONTINUOUS    albuterol (PROVENTIL VENTOLIN) nebulizer solution 2.5 mg  2.5 mg Nebulization Q6H PRN       Assessment/Plan:     Patient Active Problem List   Diagnosis Code    Fibular collateral ligament bursitis M70.50    Insomnia, unspecified G47.00    Raynaud's syndrome I73.00    Pure hypercholesterolemia E78.00    Essential hypertension, benign I10    Vitamin D deficiency E55.9    Chronic fatigue, unspecified R53.82    Prolonged Q-T interval on ECG R94.31    Near syncope R55    Myalgia M79.1    Hypoxia R09.02    CHF (congestive heart failure) (HCC) I50.9    SOB (shortness of breath) R06.02       Plan:    Cont lasix gtt  k replacement  abx's and fu cultures  Pt, ot  aru eval  dw dr Juliana Rae     Case discussed with:  []Patient  []Family  []Nursing  []Case Management  DVT Prophylaxis:  []Lovenox  []Hep SQ  []SCDs  []Coumadin   []On Heparin gtt    Signed By: Hank Dillon MD

## 2017-01-28 NOTE — PROGRESS NOTES
[facility]      CARDIOLOGY PROGRESS NOTE  RECS:    1. Acute on chronic diastolic heart failure- secondary to severe pulmonary HTN. continue slow diuresis and renal function has climbed slightly. Will monitor and watch output, symptoms. Continue with IV Lasix for now. oob and SCDs  2. Anasarca- improving  3. Pulmonary hypertension- severe. She will need a right heart catheterization to evaluate pulmonary vasodilator drugs when stable   4 History of rheumatoid arthritis. 5. Hx hypertension- now BP running low she denies dizziness. Will monitor closely. 6. Hypokalemia- replaced with +K 40 meq once. Then 20 meq +K daily. 7. Diabetes- medicine is following    Making improvement. Some conditioning concerns and would benefit with PT.   Will defer to medicine.         ASSESSMENT:  Hospital Problems  Date Reviewed: 1/23/2017          Codes Class Noted POA    SOB (shortness of breath) ICD-10-CM: R06.02  ICD-9-CM: 786.05  1/26/2017 Unknown        [facility] ICD-10-CM: I50.9  ICD-9-CM: 428.0  1/25/2017 Unknown        Hypoxia ICD-10-CM: R09.02  ICD-9-CM: 799.02  1/12/2017 Yes        Myalgia ICD-10-CM: M79.1  ICD-9-CM: 729.1  1/4/2017 Yes        Prolonged Q-T interval on ECG ICD-10-CM: R94.31  ICD-9-CM: 794.31  12/21/2016 Yes        Pure hypercholesterolemia ICD-10-CM: E78.00  ICD-9-CM: 272.0  Unknown Yes        Essential hypertension, benign ICD-10-CM: I10  ICD-9-CM: 401.1  Unknown Yes                SUBJECTIVE:  No CP  improving SOB    OBJECTIVE:    VS:   Visit Vitals    BP 94/61    Pulse 95    Temp 96.5 °F (35.8 °C)    Resp 18    Wt 74.1 kg (163 lb 5.8 oz)    SpO2 90%    Breastfeeding No    BMI 28.04 kg/m2         Intake/Output Summary (Last 24 hours) at 01/28/17 0834  Last data filed at 01/28/17 0636   Gross per 24 hour   Intake             1010 ml   Output             1450 ml   Net             -440 ml     TELE: normal sinus rhythm    General: healthy, alert, cooperative, smiling, in no apparent distress and stated age,  HENT: Normocephalic, atraumatic. Normal external eye. Neck :  Elevated JVD,   Cardiac:  regular rate and rhythm, S1, S2 normal, systolic murmur: early systolic 1/6, crescendo at 2nd right intercostal space  Chest/Lungs:mild congestion at bases without wheeze  Extremities:  Edema to thighs, abdomen and left upper arms, peripheral pulses present      Labs: Results:       Chemistry Recent Labs      01/28/17 0248 01/27/17   1330  01/27/17 0237 01/26/17   0346   GLU  110*   --   114*  90   NA  133*   --   132*  134*   K  4.2   --   3.3*  2.8*   CL  93*   --   94*  95*   CO2  29   --   28  26   BUN  41*   --   39*  39*   CREA  1.35*   --   1.12  1.12   CA  8.2*   --   8.1*  8.1*   MG   --   1.6*   --   1.3*   AGAP  11   --   10  13   BUCR  30*   --   35*  35*   AP   --    --    --   39*   TP   --    --    --   5.7*   ALB   --    --    --   2.1*   GLOB   --    --    --   3.6   AGRAT   --    --    --   0.6*      CBC w/Diff Recent Labs      01/28/17 0248 01/27/17 0237 01/26/17   0346   WBC  16.2*  15.8*  20.6*   RBC  4.39  4.17*  4.43   HGB  12.2  11.5*  12.3   HCT  36.8  34.5*  37.2   PLT  163  156  172   GRANS  86*  90*  87*   LYMPH  6*  6*  3*   EOS  0  0  1      Cardiac Enzymes No results for input(s): CPK, CKND1, RUDOLPH in the last 72 hours. No lab exists for component: CKRMB, TROIP   Coagulation No results for input(s): PTP, INR, APTT in the last 72 hours. No lab exists for component: INREXT    Lipid Panel Lab Results   Component Value Date/Time    Cholesterol, total 133 01/27/2017 02:37 AM    HDL Cholesterol 34 01/27/2017 02:37 AM    LDL, calculated 83.8 01/27/2017 02:37 AM    VLDL, calculated 15.2 01/27/2017 02:37 AM    Triglyceride 76 01/27/2017 02:37 AM    CHOL/HDL Ratio 3.9 01/27/2017 02:37 AM      BNP No results for input(s): BNPP in the last 72 hours.    Liver Enzymes Recent Labs      01/26/17   0346   TP  5.7*   ALB  2.1*   AP 39*   SGOT  19      Digoxin    Thyroid Studies Lab Results   Component Value Date/Time    TSH 0.74 12/21/2016 11:58 PM              KRISTYN Marquez       Patient seen independently  Discussed the details with PA and patient.  Please see orders & recommendations  Keturah Rothman MD

## 2017-01-28 NOTE — PROGRESS NOTES
[address] [facility] PGY-3 Rapid Response Note    Patient: Grey Vizcaino MRN: [id number]  SSN: [id number]    YOB: 1933  Age: 80 y.o. Sex: female                   Subjective:     Called to patient bedside for syncopal episode. The patient has a history of CHF, Pulmonary HTN, Raynaud's syndrome, near syncope. Called to bedside for 2nd episode of syncope this evening. Patient had a 30 second episode of unresponsiveness while lying in bed per nursing staff. When I arrived to the room patient was responding, still diaphoretic and now complaining of feeling weak all day. Patient continues to deny SOB, CP, palpitations, dizziness. Objective:      Visit Vitals    BP 98/66    Pulse 80    Temp 97.4 °F (36.3 °C)    Resp 20    Wt 74.1 kg (163 lb 5.8 oz)    SpO2 97%    Breastfeeding No    BMI 28.04 kg/m2       Physical Exam:  General appearance: alert, diaphoretic, cooperative, no distress, appears stated age  Lungs: clear to auscultation bilaterally, no respiratory distress or use accessory muscles  Heart: regular rate and rhythm, S1, S2 normal, no murmur, click, rub or gallop  Abdomen: soft, non-tender. Bowel sounds normal. No masses, no organomegaly  Pulses: 2+ and symmetric  Skin: Skin color, texture, turgor normal. No rashes or lesions  Neuro: normal without focal findings  mental status, speech normal, alert and oriented x iii      Assessment/Plan     80 y.o. yo female admitted for <principal problem not specified> s/p Rapid Response in house. Syncopal Episodes  --At this point I think very likely the patient is dry following several days of lasix drip to treat her fluid overloaded status on admission. Her urine output is down to 155 mL total today from total of 1450 yesterday. Her creatinine has continued to rise to 1.47 from 1.12.  --Patients blood pressure improved to 99/70 after giving a 250 cc NS Bolus and the patient reports feeling better and less weak. Lungs remained clear following bolus and patient not complaining of SOB. LV EF preserved on last echo. --ABG was obtained as spO2 very difficult to obtain and demonstrated hypoxia with pO2 56. [place] High Flow O2 was applied. I think her hypoxia is very likely related to her pulmonary hypertension. Patient is not complaining of SOB, cough, or CP at this time. Low suspicion at this time for PE, ACS. Patient's physician, Dr. Perkins Handing was notified of rapid response. Advised to give a second 250 cc NS bolus to which I agreed.     Samantha Webber MD PFM PGY-3  1/28/2017, 6:30 PM

## 2017-01-28 NOTE — CDMP QUERY
The documentation of CHF is unclear. CHF is written in all pn. Cardiology is writing \"Congestive heart failure, chronic diastolic dysfunction. Mainly right heart failure secondary to severe pulmonary hypertension\". Please clarify. =>Diastolic CHF  -----acute, acute exacerbation, chronic  =>Other Explanation of clinical findings  =>Unable to Determine (no explanation of clinical findings)    The medical record reflects the following clinical findings, treatment, and risk factors:  --receiving Lasix drip  --1/25/2017 06:40:  NT pro-BNP: 78508 (H)    Please clarify and document your clinical opinion in the progress notes and discharge summary including the definitive and/or presumptive diagnosis, (suspected or probable), related to the above clinical findings. Please include clinical findings supporting your diagnosis. If you DECLINE this query or would like to communicate with [facility], please utilize the \"[facility] message box\" at the TOP of the Progress Note on the right. QUESTIONS?    Venus Strickland RN [facility] [phone]

## 2017-01-28 NOTE — PROGRESS NOTES
responded to Rapid Response for  ARUN Almaraz, who is a 80 y.o.,female,     The  provided the following Interventions:  Provided crisis spiritual care and support. Offered prayers on behalf for the patient. Chart reviewed. The following outcomes were achieved:  Patient appears to be calm and coping well. Assessment:  There are no further spiritual or Anabaptist issues which require intervention at this time. Plan:  Chaplains will continue to follow and will provide spiritual care as needed.  recommends bedside caregivers page  on duty if patient or family shows signs of acute spiritual or emotional distress.      [address] [facility] Certified [address] [facility] [place]   [phone]

## 2017-01-28 NOTE — PROGRESS NOTES
[address] [facility] PGY-3 Rapid Response Note    Patient: Juanjose Lim MRN: [id number]  SSN: [id number]    YOB: 1933  Age: 80 y.o. Sex: female                   Subjective:     Called to patient bedside for unresponsiveness. The patient has a history of CHF, HTN, Raynaud's syndrome, near syncope. Nursing assistant was helping patient sit up in bed in preparation for transfer to bedside chair when patient suddenly fell back to her pillow and lost consciousness for a period of about 2 minutes per nursing staff. Patient never lost a pulse during this event and per telemetry nurse was in sinus rhythm during the event. Upon my arrival to the room the patient was responding normally, admitted to be diaphoretic but denied any CP, palpitations, SOB, lightheadedness, N/V, abdominal pain. Patient was admitted 2 days ago with CHF exacerbation in setting of chronic diastolic CHF 2/2 severe pulmonary HTN, anasarca. Patient has been on Lasix drip and her SOB and anasarca have been improving over the last several days. Patient and her Son at bedside note a history of near syncope and similar syncopal episode in December. Objective:      Visit Vitals    BP 97/63    Pulse 80    Temp 97.4 °F (36.3 °C)    Resp 20    Wt 74.1 kg (163 lb 5.8 oz)    SpO2 97%    Breastfeeding No    BMI 28.04 kg/m2       Physical Exam:  General appearance: alert, diaphoretic, cooperative, no distress, appears stated age  Lungs: clear to auscultation bilaterally, no respiratory distress or use accessory muscles  Heart: regular rate and rhythm, S1, S2 normal, no murmur, click, rub or gallop  Abdomen: soft, non-tender.  Bowel sounds normal. No masses,  no organomegaly  Pulses: 2+ and symmetric  Skin: Skin color, texture, turgor normal. No rashes or lesions  Neuro:  normal without focal findings  mental status, speech normal, alert and oriented x iii      Assessment/Plan     80 y.o. yo female admitted for CHF exacerbation s/p Rapid Response in house. Syncopal Episode  --ddx includes orthostatic hypotension, vasovagal syncope, cardiac arrhythmia   --fingerstick glucose 120  --Patient's BP is slightly hypotensive, but unchanged from previous BP over the last 24 hours  --EKG unchanged from previous (some background noise makes it a difficult read, but no glaring concern for arrhythmia or ischemia)  --Telemetry notes sinus rhythm during event  --BMP and CBC collected  --I am reluctant to give the patient fluids in setting of CHF exacerbation currently on Lasix drip, especially considering her BP is stable. Patient's physician, Dr. Nereida Fitzpatrick was notified of rapid response and recommended to discontinue lasix drip at this time. I placed order to discontinue Lasix. No other recommendations at this time.       Ajit White MD [facility] PGY-3  1/28/2017, 4:32 PM

## 2017-01-29 NOTE — CONSULTS
[facility]  Pulmonary, Critical Care, and Sleep Medicine      Name: Gregory Doe MRN: [id number]   : 11/15/1933 Hospital: [address] [place] [address]   Date: 2017          Critical Care Initial Patient Consult    Requesting MD:   Dr. Mohsen Bhardwaj MD                                                Reason for CC Consult: To assist with management of patient with her pulmonary hypertension. IMPRESSION:   Active Problems:  Pure hypercholesterolemia ()     Essential hypertension, benign ()     Prolonged Q-T interval on ECG (2016)     Myalgia (2017)     Hypoxia (2017)     CHF (congestive heart failure) ([facility] [address] (2017)     SOB (shortness of breath) (2017)         RECOMMENDATIONS:   · Pt to transfer to ICU, awaiting attending to [place] [facility]iscussed with Dr. Yogi Matias and Dr. Lizeth Sherman. Dr. Yogi Matias has seen pt today. We will continue with Dr. Mariya Reyes of care:  · Continue bipap  · This is not septic shock : d/c abx  · Hypotension and renal insufficiency is due to severe pul htn causing right heart failure : syncopal episodes very poor prognostic sign  · D/w patient and family at bedside : recommend to transfer to [facility] ICU for swan steve catheter monitored iv flolan rx  · D/w , [facility] [place], thus Dr. Lauro Quinn is arranging for pt to be [facility]. · Repeat all labs with pro-bnp  · Repeat lactate   · ivp bicarb now and q 8 hrs for 24 hrs  · Very poor prognosis  · Transfer patient to [facility] [place] [facility]       Subjective/History:      Patient is a 80 y.o. female She has a past medical history of Raynaud's  syndrome, chronic respiratory failure, hypertension. In December, she had a transthoracic echo, which showed that she had elevated pressures in her right ventricle, consistent with pulmonary hypertension EF at that time was 55% to 60% and grade 1 diastolic dysfunction.  Discussed with Dr. Yogi Matias and she has expressed that the patients symptoms are more consistant with right-sided heart failure due to pulmonary HTN and recommends that the patient be transferred to [facility] ICU for swan steve catheter monitored IV flolan Rx. Dr. Gregorio Bennett has discussed with [facility] [place]ology and they have no beds available. Per Dr. Gregorio Bennett, arrangments are being made for pt to be transferred to [facility]. Pt will transfer to our ICU until transport to [facility]. Rapid Response called today for decreased responsiveness while in room 364. Apparently 2 RR's were called on yesterday as well for the same issue. Past Medical History   Diagnosis Date    Acute bronchitis     Acute upper respiratory infections of unspecified site     Essential hypertension, benign     Fibular collateral ligament bursitis     Insomnia, unspecified     Myalgia and myositis, unspecified     Pure hypercholesterolemia     Raynaud's syndrome       Past Surgical History   Procedure Laterality Date    Hx hysterectomy      Hx appendectomy      Hx cholecystectomy      Hx bladder suspension      Hx other surgical Right      foot surgery      Prior to Admission medications    Medication Sig Start Date End Date Taking? Authorizing Provider   metOLazone (ZAROXOLYN) 2.5 mg tablet 1 tablet every other day (stop HCTZ) 1/12/17  Yes Jordan Espinoza MD   ergocalciferol (VITAMIN D2) 50,000 unit capsule 1 capsule daily 12/27/16  Yes Jordan Espinoza MD   doxepin (SINEQUAN) 10 mg capsule TAKE 1 TABLET BY MOUTH AT BEDTIME 1/21/17   Jordan Espinoza MD   estradiol (ESTRACE) 1 mg tablet TAKE 1 TABLET BY MOUTH EVERY DAY 1/14/17   Jordan Espinoza MD   umeclidinium-vilanterol [facility]) 62.5-25 mcg/actuation inhaler Take 1 Puff by inhalation daily. 1/13/17   Denton Ramirez MD   predniSONE (DELTASONE) 5 mg tablet 3 tablets daily for one week.  2 1/2 tablets daily for one weekly, then 2 tablets daily 1/12/17   Jordan Espinoza MD   umeclidinium-vilanterol [facility]) 62.5-25 mcg/actuation inhaler Take 1 Puff by inhalation daily. 1/11/17   Donovan Wesley MD   inhalational spacing device Use with the inhaler 1/4/17   Leonor Ying MD   promethazine (PHENERGAN) 25 mg tablet Take 25 mg by mouth every eight (8) hours as needed for Nausea.     Historical Provider   PROAIR HFA 90 mcg/actuation inhaler INHALE 2 PUFFS BY MOUTH 3 TIMES A DAY AS NEEDED FOR CHEST CONGESTION 11/3/16   Leonor Ying MD   doxepin [facility]) 10 mg capsule 1 tablet at bedtime 10/19/16   Leonor Ying MD   ALPRAZolam [facility]) 0.25 mg tablet 1/2 tablet twice per day as needed for stress 10/19/16   Leonor Ying MD   gabapentin (NEURONTIN) 300 mg capsule TAKE 2 CAPSULE BY MOUTH AT BEDTIME 3/29/16   Leonor Ying MD   NIFEdipine ER (PROCARDIA XL) 30 mg ER tablet TAKE 1 TABLET BY MOUTH DAILY 3/6/16   Leonor Ying MD     Current Facility-Administered Medications   Medication Dose Route Frequency    VANCOMYCIN INFORMATION NOTE   Other CONTINUOUS    dextrose (D50W) 50% injection syrg        [START ON 1/31/2017] Vancomycin random level due 1/31/17 at 0400  1 Each Other ONCE    sodium bicarbonate 8.4 % (1 mEq/mL) injection 50 mEq  50 mEq IntraVENous TID    sodium polystyrene (KAYEXALATE) 15 gram/60 mL oral suspension 30 g  30 g Oral NOW    [START ON 1/30/2017] enoxaparin (LOVENOX) injection 30 mg  30 mg SubCUTAneous Q24H    aspirin chewable tablet 81 mg  81 mg Oral DAILY    doxepin (SINEquan) capsule 10 mg  10 mg Oral QPM    ergocalciferol (ERGOCALCIFEROL) capsule 50,000 Units  50,000 Units Oral Q7D    estradiol (ESTRACE) tablet 1 mg  1 mg Oral DAILY    gabapentin (NEURONTIN) capsule 600 mg  600 mg Oral QHS     Allergies   Allergen Reactions    Zithromax [Azithromycin] Nausea Only    Sulfa (Sulfonamide Antibiotics) Itching    Penicillins Other (comments)     Yeast infection      Social History   Substance Use Topics    Smoking status: Former Smoker     Packs/day: 1.00     Years: 20.00     Quit date: 1992    Smokeless tobacco: Never Used    Alcohol use Yes      Comment: occas      Family History   Problem Relation Age of Onset    Heart Disease Mother     Heart Disease Father     Other Sister      CNS aneurysm        Review of Systems:   Reviewed 10 systems all negative with exception of SOB and fatigue. Objective:   Vital Signs:    Visit Vitals    /72    Pulse 96    Temp 98.6 °F (37 °C)    Resp 18    Wt 74.7 kg (164 lb 11.2 oz)    SpO2 95%    Breastfeeding No    BMI 28.27 kg/m2       O2 Device: BIPAP   O2 Flow Rate (L/min): 7 l/min   Temp (24hrs), Av.2 °F (36.2 °C), Min:96.2 °F (35.7 °C), Max:98.6 °F (37 °C)       Intake/Output:   Last shift:       07 -  1900  In: 125 [P.O.:125]  Out: 50 [Urine:50]  Last 3 shifts:  190 -  0700  In: 1370 [P.O.:960; I.V.:410]  Out: 1355 [Urine:1355]    Intake/Output Summary (Last 24 hours) at 17 1539  Last data filed at 17 0951   Gross per 24 hour   Intake              835 ml   Output              250 ml   Net              585 ml     Physical Exam:  General:  Alert, cooperative, no distress, appears stated age. Head:  Normocephalic, without obvious abnormality, atraumatic. Eyes:  Conjunctivae/corneas clear. PERRL, EOMs intact. Fundi benign. Ears:  Normal TMs and external ear canals both ears. Nose: Nares normal. Septum midline. Mucosa normal. No drainage or sinus tenderness. Throat: Lips, mucosa, and tongue normal. Teeth and gums normal.   Neck: Supple, symmetrical, trachea midline, no adenopathy, thyroid: no enlargement/tenderness/nodules, no carotid bruit and no JVD. Back:  Symmetric, no curvature. ROM normal. No CVA tenderness. Lungs:  Clear to auscultation bilaterally. Chest wall:  No tenderness or deformity. Heart:  Regular rate and rhythm, S1, S2 normal, no murmur, click, rub or gallop. Breast Exam:  No tenderness, masses, or nipple abnormality. Abdomen:  Soft, non-tender.  Bowel sounds normal. No masses, No organomegaly. Genitalia:  Normal female without lesion, discharge or tenderness. Rectal:  Normal tone, no masses or tenderness  Guaiac negative stool. Extremities: Extremities normal, atraumatic, no cyanosis, decreasing edema. Pulses: 2+ and symmetric all extremities. Skin: Skin color, texture, turgor normal. No rashes or lesions. Lymph nodes: Cervical, supraclavicular, and axillary nodes normal.   Neurologic: CNII-XII intact.  Normal strength, sensation and reflexes throughout.                                                                            Data:     Recent Results (from the past 24 hour(s))   GLUCOSE, POC    Collection Time: 01/28/17  4:02 PM   Result Value Ref Range    Glucose (POC) 120 (H) 70 - 110 mg/dL   EKG, 12 LEAD, SUBSEQUENT    Collection Time: 01/28/17  4:11 PM   Result Value Ref Range    Ventricular Rate 98 BPM    Atrial Rate 98 BPM    P-R Interval 162 ms    QRS Duration 82 ms    Q-T Interval 342 ms    QTC Calculation (Bezet) 436 ms    Calculated P Axis 28 degrees    Calculated R Axis 110 degrees    Calculated T Axis 72 degrees    Diagnosis       Normal sinus rhythm  Right axis deviation  Possible Right ventricular hypertrophy  Septal infarct (cited on or before 25-JAN-2017)  Abnormal ECG  Confirmed by Charlie Montejo MD, [facility] Eagle ([id number]) on 1/29/2017 05:60:53 AM     METABOLIC PANEL, BASIC    Collection Time: 01/28/17  4:20 PM   Result Value Ref Range    Sodium 132 (L) 136 - 145 mmol/L    Potassium 4.4 3.5 - 5.5 mmol/L    Chloride 95 (L) 100 - 108 mmol/L    CO2 26 21 - 32 mmol/L    Anion gap 11 3.0 - 18 mmol/L    Glucose 112 (H) 74 - 99 mg/dL    BUN 43 (H) 7.0 - 18 MG/DL    Creatinine 1.47 (H) 0.6 - 1.3 MG/DL    BUN/Creatinine ratio 29 (H) 12 - 20      GFR est AA 41 (L) >60 ml/min/1.73m2    GFR est non-AA 34 (L) >60 ml/min/1.73m2    Calcium 8.0 (L) 8.5 - 10.1 MG/DL   CBC WITH AUTOMATED DIFF    Collection Time: 01/28/17  4:20 PM   Result Value Ref Range    WBC 17.7 (H) 4.6 - 13.2 K/uL    RBC 4.52 4.20 - 5.30 M/uL    HGB 12.7 12.0 - 16.0 g/dL    HCT 37.9 35.0 - 45.0 %    MCV 83.8 74.0 - 97.0 FL    MCH 28.1 24.0 - 34.0 PG    MCHC 33.5 31.0 - 37.0 g/dL    RDW 18.3 (H) 11.6 - 14.5 %    PLATELET 487 389 - 919 K/uL    MPV 9.8 9.2 - 11.8 FL    NEUTROPHILS 81 (H) 40 - 73 %    LYMPHOCYTES 8 (L) 21 - 52 %    MONOCYTES 10 3 - 10 %    EOSINOPHILS 1 0 - 5 %    BASOPHILS 0 0 - 2 %    ABS. NEUTROPHILS 14.4 (H) 1.8 - 8.0 K/UL    ABS. LYMPHOCYTES 1.4 0.9 - 3.6 K/UL    ABS. MONOCYTES 1.8 (H) 0.05 - 1.2 K/UL    ABS. EOSINOPHILS 0.1 0.0 - 0.4 K/UL    ABS. BASOPHILS 0.0 0.0 - 0.1 K/UL    DF AUTOMATED     GLUCOSE, POC    Collection Time: 01/28/17  5:45 PM   Result Value Ref Range    Glucose (POC) 110 70 - 110 mg/dL   LACTIC ACID, PLASMA    Collection Time: 01/28/17  6:10 PM   Result Value Ref Range    Lactic acid 3.7 (HH) 0.4 - 2.0 MMOL/L   CULTURE, BLOOD    Collection Time: 01/28/17  6:10 PM   Result Value Ref Range    Special Requests: NO SPECIAL REQUESTS      Culture result: NO GROWTH AFTER 10 HOURS     POC G3    Collection Time: 01/28/17  6:11 PM   Result Value Ref Range    Device: NASAL CANNULA      Flow rate (POC) 3 L/M    FIO2 (POC) 32 %    pH (POC) 7.365 7.35 - 7.45      pCO2 (POC) 39.3 35.0 - 45.0 MMHG    pO2 (POC) 56 (L) 80 - 100 MMHG    HCO3 (POC) 22.4 22 - 26 MMOL/L    sO2 (POC) 88 (L) 92 - 97 %    Base deficit (POC) 3 mmol/L    Allens test (POC) YES      Total resp.  rate 18      Site RIGHT RADIAL      Specimen type (POC) ARTERIAL      Performed by Aida Leal, BLOOD    Collection Time: 01/28/17  6:30 PM   Result Value Ref Range    Special Requests: NO SPECIAL REQUESTS      Culture result: NO GROWTH AFTER 10 HOURS     CULTURE, URINE    Collection Time: 01/28/17  7:00 PM   Result Value Ref Range    Special Requests: NO SPECIAL REQUESTS      Culture result: NO GROWTH AFTER 14 HOURS     CBC WITH AUTOMATED DIFF    Collection Time: 01/29/17  4:10 AM   Result Value Ref Range    WBC 19.7 (H) 4.6 - 13.2 K/uL    RBC 4.77 4.20 - 5.30 M/uL    HGB 13.3 12.0 - 16.0 g/dL    HCT 40.9 35.0 - 45.0 %    MCV 85.7 74.0 - 97.0 FL    MCH 27.9 24.0 - 34.0 PG    MCHC 32.5 31.0 - 37.0 g/dL    RDW 18.5 (H) 11.6 - 14.5 %    PLATELET 016 020 - 671 K/uL    MPV 9.9 9.2 - 11.8 FL    NEUTROPHILS 87 (H) 42 - 75 %    BANDS 1 0 - 5 %    LYMPHOCYTES 5 (L) 20 - 51 %    MONOCYTES 7 2 - 9 %    EOSINOPHILS 0 0 - 5 %    BASOPHILS 0 0 - 3 %    ABS. NEUTROPHILS 17.3 (H) 1.8 - 8.0 K/UL    ABS. LYMPHOCYTES 1.0 0.8 - 3.5 K/UL    ABS. MONOCYTES 1.4 (H) 0 - 1.0 K/UL    ABS. EOSINOPHILS 0.0 0.0 - 0.4 K/UL    ABS.  BASOPHILS 0.0 0.0 - 0.06 K/UL    DF MANUAL      PLATELET COMMENTS ADEQUATE PLATELETS      RBC COMMENTS ANISOCYTOSIS  1+        RBC COMMENTS POLYCHROMASIA  1+        RBC COMMENTS TARGET CELLS  1+       METABOLIC PANEL, BASIC    Collection Time: 01/29/17  4:10 AM   Result Value Ref Range    Sodium 133 (L) 136 - 145 mmol/L    Potassium 5.8 (H) 3.5 - 5.5 mmol/L    Chloride 95 (L) 100 - 108 mmol/L    CO2 26 21 - 32 mmol/L    Anion gap 12 3.0 - 18 mmol/L    Glucose 109 (H) 74 - 99 mg/dL    BUN 47 (H) 7.0 - 18 MG/DL    Creatinine 1.96 (H) 0.6 - 1.3 MG/DL    BUN/Creatinine ratio 24 (H) 12 - 20      GFR est AA 30 (L) >60 ml/min/1.73m2    GFR est non-AA 24 (L) >60 ml/min/1.73m2    Calcium 8.3 (L) 8.5 - 10.1 MG/DL   LACTIC ACID, PLASMA    Collection Time: 01/29/17  4:10 AM   Result Value Ref Range    Lactic acid 4.7 (HH) 0.4 - 2.0 MMOL/L   URINALYSIS W/ RFLX MICROSCOPIC    Collection Time: 01/29/17  5:45 AM   Result Value Ref Range    Color DARK YELLOW      Appearance TURBID      Specific gravity 1.021 1.005 - 1.030      pH (UA) 5.0 5.0 - 8.0      Protein 100 (A) NEG mg/dL    Glucose NEGATIVE  NEG mg/dL    Ketone NEGATIVE  NEG mg/dL    Bilirubin MODERATE (A) NEG      Blood LARGE (A) NEG      Urobilinogen 1.0 0.2 - 1.0 EU/dL    Nitrites NEGATIVE  NEG      Leukocyte Esterase MODERATE (A) NEG     URINE MICROSCOPIC ONLY    Collection Time: 01/29/17 5:45 AM   Result Value Ref Range    WBC 21 to 35 0 - 4 /hpf    RBC TOO NUMEROUS TO COUNT 0 - 5 /hpf    Epithelial cells 4+ 0 - 5 /lpf    Bacteria 4+ (A) NEG /hpf    Amorphous Crystals 2+ (A) NEG    Granular Cast 0 to 3 NEG /lpf   GLUCOSE, POC    Collection Time: 01/29/17  8:54 AM   Result Value Ref Range    Glucose (POC) 59 (L) 70 - 110 mg/dL   CBC WITH AUTOMATED DIFF    Collection Time: 01/29/17  9:00 AM   Result Value Ref Range    WBC 21.3 (H) 4.6 - 13.2 K/uL    RBC 5.02 4.20 - 5.30 M/uL    HGB 14.1 12.0 - 16.0 g/dL    HCT 43.5 35.0 - 45.0 %    MCV 86.7 74.0 - 97.0 FL    MCH 28.1 24.0 - 34.0 PG    MCHC 32.4 31.0 - 37.0 g/dL    RDW 18.5 (H) 11.6 - 14.5 %    PLATELET 409 589 - 730 K/uL    MPV 10.0 9.2 - 11.8 FL    NEUTROPHILS 87 (H) 42 - 75 %    BANDS 2 0 - 5 %    LYMPHOCYTES 4 (L) 20 - 51 %    MONOCYTES 7 2 - 9 %    EOSINOPHILS 0 0 - 5 %    BASOPHILS 0 0 - 3 %    ABS. NEUTROPHILS 18.5 (H) 1.8 - 8.0 K/UL    ABS. LYMPHOCYTES 0.9 0.8 - 3.5 K/UL    ABS. MONOCYTES 1.5 (H) 0 - 1.0 K/UL    ABS. EOSINOPHILS 0.0 0.0 - 0.4 K/UL    ABS. BASOPHILS 0.0 0.0 - 0.1 K/UL    PLATELET COMMENTS LARGE PLATELETS      RBC COMMENTS ANISOCYTOSIS  1+        RBC COMMENTS POIKILOCYTOSIS  1+        RBC COMMENTS POLYCHROMASIA  1+        RBC COMMENTS TARGET CELLS  1+        DF MANUAL     METABOLIC PANEL, COMPREHENSIVE    Collection Time: 01/29/17  9:00 AM   Result Value Ref Range    Sodium 134 (L) 136 - 145 mmol/L    Potassium 6.0 (H) 3.5 - 5.5 mmol/L    Chloride 93 (L) 100 - 108 mmol/L    CO2 22 21 - 32 mmol/L    Anion gap 19 (H) 3.0 - 18 mmol/L    Glucose 67 (L) 74 - 99 mg/dL    BUN 50 (H) 7.0 - 18 MG/DL    Creatinine 2.23 (H) 0.6 - 1.3 MG/DL    BUN/Creatinine ratio 22 (H) 12 - 20      GFR est AA 25 (L) >60 ml/min/1.73m2    GFR est non-AA 21 (L) >60 ml/min/1.73m2    Calcium 8.2 (L) 8.5 - 10.1 MG/DL    Bilirubin, total 1.3 (H) 0.2 - 1.0 MG/DL    ALT 28 13 - 56 U/L    AST 36 15 - 37 U/L    Alk.  phosphatase 54 45 - 117 U/L    Protein, total 5.5 (L) 6.4 - 8.2 g/dL    Albumin 2.3 (L) 3.4 - 5.0 g/dL    Globulin 3.2 2.0 - 4.0 g/dL    A-G Ratio 0.7 (L) 0.8 - 1.7     CARDIAC PANEL,(CK, CKMB & TROPONIN)    Collection Time: 01/29/17  9:00 AM   Result Value Ref Range    CK 40 26 - 192 U/L    CK - MB 5.4 (H) 0.5 - 3.6 ng/ml    CK-MB Index 13.5 (H) 0.0 - 4.0 %    Troponin-I, Qt. 0.09 (H) 0.0 - 0.045 NG/ML   LACTIC ACID, PLASMA    Collection Time: 01/29/17  9:00 AM   Result Value Ref Range    Lactic acid 7.9 (HH) 0.4 - 2.0 MMOL/L   POC G3    Collection Time: 01/29/17  9:03 AM   Result Value Ref Range    Device: High Flow Nasal Cannula      Flow rate (POC) 7 L/M    FIO2 (POC) 0.48 %    pH (POC) 7.302 (L) 7.35 - 7.45      pCO2 (POC) 35.6 35.0 - 45.0 MMHG    pO2 (POC) 58 (L) 80 - 100 MMHG    HCO3 (POC) 17.6 (L) 22 - 26 MMOL/L    sO2 (POC) 87 (L) 92 - 97 %    Base deficit (POC) 9 mmol/L    Allens test (POC) YES      Total resp.  rate 18      Site RIGHT RADIAL      Specimen type (POC) ARTERIAL      Performed by Neal Boothe    EKG, 12 LEAD, SUBSEQUENT    Collection Time: 01/29/17  9:11 AM   Result Value Ref Range    Ventricular Rate 102 BPM    Atrial Rate 102 BPM    P-R Interval 164 ms    QRS Duration 82 ms    Q-T Interval 336 ms    QTC Calculation (Bezet) 437 ms    Calculated P Axis 78 degrees    Calculated R Axis 115 degrees    Calculated T Axis 23 degrees    Diagnosis       Sinus tachycardia  Right axis deviation  Possible Right ventricular hypertrophy  Septal infarct , age undetermined  Abnormal ECG    Confirmed by Charlie Montejo MD, Demarc[facility] Eagle ([id number]) on 1/29/2017 10:36:02 AM     GLUCOSE, POC    Collection Time: 01/29/17  9:14 AM   Result Value Ref Range    Glucose (POC) 176 (H) 70 - 110 mg/dL   GLUCOSE, POC    Collection Time: 01/29/17 11:35 AM   Result Value Ref Range    Glucose (POC) 150 (H) 70 - 110 mg/dL   POC G3    Collection Time: 01/29/17 11:40 AM   Result Value Ref Range    Device: BIPAP      FIO2 (POC) 0.60 %    pH (POC) 7.313 (L) 7.35 - 7.45      pCO2 (POC) 38.8 35.0 - 45.0 MMHG    pO2 (POC) 73 (L) 80 - 100 MMHG    HCO3 (POC) 19.7 (L) 22 - 26 MMOL/L    sO2 (POC) 93 92 - 97 %    Base deficit (POC) 7 mmol/L    PEEP/CPAP (POC) 5 cmH2O    PIP (POC) 10      Allens test (POC) YES      Total resp. rate 20      Site LEFT BRACHIAL      Specimen type (POC) ARTERIAL      Performed by Marty Rodriguez    PRO-BNP    Collection Time: 01/29/17  1:05 PM   Result Value Ref Range    NT pro-BNP 43959 (H) 0 - 0174 PG/ML   METABOLIC PANEL, BASIC    Collection Time: 01/29/17  1:05 PM   Result Value Ref Range    Sodium 136 136 - 145 mmol/L    Potassium 6.0 (H) 3.5 - 5.5 mmol/L    Chloride 95 (L) 100 - 108 mmol/L    CO2 28 21 - 32 mmol/L    Anion gap 13 3.0 - 18 mmol/L    Glucose 145 (H) 74 - 99 mg/dL    BUN 51 (H) 7.0 - 18 MG/DL    Creatinine 2.09 (H) 0.6 - 1.3 MG/DL    BUN/Creatinine ratio 24 (H) 12 - 20      GFR est AA 27 (L) >60 ml/min/1.73m2    GFR est non-AA 23 (L) >60 ml/min/1.73m2    Calcium 7.7 (L) 8.5 - 10.1 MG/DL   LACTIC ACID, PLASMA    Collection Time: 01/29/17  1:05 PM   Result Value Ref Range    Lactic acid 4.5 (HH) 0.4 - 2.0 MMOL/L                 Imaging:  I have personally reviewed the patients radiographs and have reviewed the reports:  AP CHEST, PORTABLE     INDICATION: Shortness of breath     COMPARISON: Prior chest x-rays, most recent 1/25/2017     FINDINGS: EKG leads overlie the patient.     Table moderate to marked enlargement of the cardiac silhouette. Atherosclerosis  noted. The pulmonary vasculature is unremarkable. Haziness at the mid to lower  right lung with increasing gradient towards the right lung base, slightly  increased since prior exam. Persistent peripheral opacity at the left midlung  and blunting of the left costophrenic sulcus. No convincing evidence for  pneumothorax No acute osseous abnormalities are identified.     IMPRESSION  Impression:       1.  Stable to slightly increased size of moderate right pleural effusion, and  stable small left pleural effusion.  -Overlying atelectasis present but superimposed infiltrate/edema not excluded.     2. Stable appearance of peripheral opacity at the left midlung.     3. Stable moderate to marked enlargement of the cardiac silhouette.                         Total critical care time exclusive of procedures: 40 minutes  Aspen Martinez NP     Addendum:  Patient seen by Dr. Tutu Trujillo and transferred to [facility].      Pedro Lucas MD 1/30/2017

## 2017-01-29 NOTE — DISCHARGE SUMMARY
[facility]  TRANSFER SUMMARY    Name:  Jaquelin Reese  MR#:  [id number]  :  11/15/1933  Account #:  [de-identified]  Date of Adm:  2017  Date of Transfer:      TRANSFER DIAGNOSES INCLUDE  1. Acute hypoxic respiratory failure. 2. Acute on chronic diastolic heart failure secondary to severe  pulmonary hypertension. 3. Hypotension and renal insufficiency, most likely secondary to #1 and  #2. SECONDARY DIAGNOSES INCLUDE  1. Rheumatoid arthritis. 2. Raynaud's. 3. History of diastolic heart failure, ejection fraction of 55% to 60%,  grade 1 diastolic dysfunction, as well as right ventricular systolic  function of 85 on transthoracic echo done on 2016. CONSULTANTS: Pulmonary, Cardiology. PROCEDURES  1. Chest x-ray: This was done on admission, 2017 showing  small to moderate right pleural effusion, tiny left pleural effusion, left  mid lateral lung infiltrate, likely in the left lower lobe, probable right  lower lobe infiltrate or atelectasis, stable cardiomegaly. 2. Repeat chest x-ray, 2017, showing stable to slightly increased  size of moderate right pleural effusion, and stable small left pleural  effusion. Overlying atelectasis present, but superimposed infiltrate,  edema not excluded. Stable appearance of peripheral opacity at the  left mid lung. Stable moderate to marked enlargement of the cardiac  silhouette. 3. Upper extremity Dopplers of the left upper extremity done  2017 showing no evidence of DVT in the left upper extremity. HISTORY OF PRESENT ILLNESS AND HOSPITAL COURSE: The  patient is an 66-year-old female. She has a past medical history as  mentioned above. Her chief complaint was that she was having  dyspnea on exertion. She was complaining of left upper extremity  swelling, as well as swelling in her legs. This had been ongoing since  her last hospitalization about a month ago.  When she presented to the  hospital, she had the chest x-ray with the results as stated above. Because of possibility of a left lower lobe infiltrate, she had been on  antibiotics to include Levaquin that was renally dosed. She was started  on Lasix drip at 5 mL an hour. This had decreased the swelling in her  legs; however, yesterday around late afternoon, there were 2 RRTs  called secondary to increase in somnolence, decreased  responsiveness, and possibly a syncopal event. At that time, her  systolic pressure was noted to be in the 80s. Her Lasix drip was  stopped, she received 2 boluses of normal saline at 250 mL. She had  a repeat chest x-ray during the RRT with the results as stated above. This morning, however, there was another RRT called as again she  was having some decreased responsiveness. She was placed on  a Salter nasal cannula and then eventually BiPAP. Her ABG on the last  BiPAP is showing a pH of 7.31, pCO2 of 38.8, pO2 of 73%, and 93%. She was eventually taken off the BiPAP and placed on Salter nasal  cannula. As per the recommendations of the pulmonologist, who felt that her  hypotension, possibly syncopal event and decreased responsiveness  was being driven by her severe pulmonary hypertension, most likely  caused by her history of Raynaud's as well as rheumatoid arthritis,  recommendation for transfer to a tertiary care center for a Hartley-Paul  catheter and monitored IV flow and treatment. Calls were placed  to [facility] as well as [facility]. She was  accepted today at cardiac ICU at [facility].    Prior to being transferred, she did have treatment for her hyperkalemia. She did receive a dose of Kayexalate, as well as 1 amp of D50 and 10  units of regular insulin IV.  She was receiving potassium replacement  along with her Lasix drip as of yesterday, when her potassium was 4.4  and her creatinine was 1.47; however, during the RRTs, it was noted  that her potassium was 5.8 and then repeat 6 and then repeat again  6.0, thus she had treatment for hyperkalemia. It was also noted that  she had an increase in her BUN and creatinine, as high as 50 and  2.23. Currently, her BUN and creatinine as of 1 o'clock in the afternoon  was 51 and 2.09. Prior to transferring the patient, I did discuss the  case with Pulmonary as well as the patient's family, who had requested  that she be transferred to a tertiary care center as she would want full  treatment and that she was a FULL CODE. Prior to being transferred,  again, she did have a BNP that was noted to be 31,898. Of note, when  she presented on 01/25/2017, her BNP was noted to be 13,724. She  remains on Levaquin. She did have 1 dose of vancomycin as during  the RRT it was noted that she had a lactic acid of 7.9; however,  Pulmonary did see her today and as per pulmonary consult note, she  has stated that \"this is not septic shock and to discontinue the  antibiotics. \" Her blood cultures during this hospitalization have shown  no growth to date. This includes blood as well as urine. Of note, during  her hospitalization she has not had an elevated temperature. Her blood  pressure as of transfer is 117/87, pulse 96, temperature 98.6, O2  saturation is 92% on 7 liters, and she is transferred out today to  [facility], Cardiac Intensive Care Unit. DISCHARGE MEDICATIONS INCLUDE  1. Oxygen via Salter nasal cannula to maintain a saturation greater  than 90%. 2. Anoro daily. 3. Xanax as needed. 4. Doxepin 10 nightly. 5. Estradiol 1 daily. 6. Neurontin 600 at night. DISCHARGE INSTRUCTIONS: She will be transferred today. She  should follow up with Dr. Greg Marley, whom I have spoken with  during this hospitalization, as well as all other consultants as  scheduled. George Herman MD [facility] / Darrel Diez  D:  01/29/2017   16:35  T:  01/29/2017   17:28  Job #:  [id number]    Fax to [facility], Cardiac ICU, [phone], c/o Dr. Noble Anderson.

## 2017-01-29 NOTE — PROGRESS NOTES
Consulted for ARF and hyperkalemia  Reviewed chart,   Severe right side heart failure, severe pulmonary HTN  Renal failure likely from over diuresis. Hyperkalemia, in setting of renal failure and on potassium supplement. Agree with kayexelate, D50 + insulin, bicarb  Repeat renal function. Further recommendation base on response. Full consult to follow.

## 2017-01-29 NOTE — ROUTINE PROCESS
Bedside and Verbal shift change report given to Sherren Lobos  (oncoming nurse) by Preet Tee RN  (offgoing nurse). Report included the following information SBAR, Kardex, ED Summary, Intake/Output, MAR, Recent Results and Cardiac Rhythm NSR.

## 2017-01-29 NOTE — PROGRESS NOTES
[facility] Hospitalist Group  Progress Note    Patient: Levi Mina Age: 80 y.o. : 11/15/1933 MR#: [id number] SSN: [id number]  Date/Time: 2017 12:00 PM    Subjective:      On bipap, breathing stable, family in room    Objective:   VS:   Visit Vitals    /72    Pulse 96    Temp 98.6 °F (37 °C)    Resp 18    Wt 74.7 kg (164 lb 11.2 oz)    SpO2 95%    Breastfeeding No    BMI 28.27 kg/m2      Tmax/24hrs: Temp (24hrs), Av.2 °F (36.2 °C), Min:96.2 °F (35.7 °C), Max:98.6 °F (37 °C)     Input/Output:     Intake/Output Summary (Last 24 hours) at 17 1537  Last data filed at 17 0951   Gross per 24 hour   Intake              835 ml   Output              250 ml   Net              585 ml       General:  nad  Cardiovascular:  s1s2  Pulmonary:  clear  GI:  benign  Extremities:  1+ le edema  Additional:  No skin changes    Labs:    Recent Results (from the past 24 hour(s))   GLUCOSE, POC    Collection Time: 17  4:02 PM   Result Value Ref Range    Glucose (POC) 120 (H) 70 - 110 mg/dL   EKG, 12 LEAD, SUBSEQUENT    Collection Time: 17  4:11 PM   Result Value Ref Range    Ventricular Rate 98 BPM    Atrial Rate 98 BPM    P-R Interval 162 ms    QRS Duration 82 ms    Q-T Interval 342 ms    QTC Calculation (Bezet) 436 ms    Calculated P Axis 28 degrees    Calculated R Axis 110 degrees    Calculated T Axis 72 degrees    Diagnosis       Normal sinus rhythm  Right axis deviation  Possible Right ventricular hypertrophy  Septal infarct (cited on or before 2017)  Abnormal ECG  Confirmed by Winnie Curtis MD, Wiliam Meng ([id number]) on 2017 75:85:80 AM     METABOLIC PANEL, BASIC    Collection Time: 17  4:20 PM   Result Value Ref Range    Sodium 132 (L) 136 - 145 mmol/L    Potassium 4.4 3.5 - 5.5 mmol/L    Chloride 95 (L) 100 - 108 mmol/L    CO2 26 21 - 32 mmol/L    Anion gap 11 3.0 - 18 mmol/L    Glucose 112 (H) 74 - 99 mg/dL    BUN 43 (H) 7.0 - 18 MG/DL    Creatinine 1.47 (H) 0.6 - 1.3 MG/DL    BUN/Creatinine ratio 29 (H) 12 - 20      GFR est AA 41 (L) >60 ml/min/1.73m2    GFR est non-AA 34 (L) >60 ml/min/1.73m2    Calcium 8.0 (L) 8.5 - 10.1 MG/DL   CBC WITH AUTOMATED DIFF    Collection Time: 01/28/17  4:20 PM   Result Value Ref Range    WBC 17.7 (H) 4.6 - 13.2 K/uL    RBC 4.52 4.20 - 5.30 M/uL    HGB 12.7 12.0 - 16.0 g/dL    HCT 37.9 35.0 - 45.0 %    MCV 83.8 74.0 - 97.0 FL    MCH 28.1 24.0 - 34.0 PG    MCHC 33.5 31.0 - 37.0 g/dL    RDW 18.3 (H) 11.6 - 14.5 %    PLATELET 496 015 - 154 K/uL    MPV 9.8 9.2 - 11.8 FL    NEUTROPHILS 81 (H) 40 - 73 %    LYMPHOCYTES 8 (L) 21 - 52 %    MONOCYTES 10 3 - 10 %    EOSINOPHILS 1 0 - 5 %    BASOPHILS 0 0 - 2 %    ABS. NEUTROPHILS 14.4 (H) 1.8 - 8.0 K/UL    ABS. LYMPHOCYTES 1.4 0.9 - 3.6 K/UL    ABS. MONOCYTES 1.8 (H) 0.05 - 1.2 K/UL    ABS. EOSINOPHILS 0.1 0.0 - 0.4 K/UL    ABS. BASOPHILS 0.0 0.0 - 0.1 K/UL    DF AUTOMATED     GLUCOSE, POC    Collection Time: 01/28/17  5:45 PM   Result Value Ref Range    Glucose (POC) 110 70 - 110 mg/dL   LACTIC ACID, PLASMA    Collection Time: 01/28/17  6:10 PM   Result Value Ref Range    Lactic acid 3.7 (HH) 0.4 - 2.0 MMOL/L   CULTURE, BLOOD    Collection Time: 01/28/17  6:10 PM   Result Value Ref Range    Special Requests: NO SPECIAL REQUESTS      Culture result: NO GROWTH AFTER 10 HOURS     POC G3    Collection Time: 01/28/17  6:11 PM   Result Value Ref Range    Device: NASAL CANNULA      Flow rate (POC) 3 L/M    FIO2 (POC) 32 %    pH (POC) 7.365 7.35 - 7.45      pCO2 (POC) 39.3 35.0 - 45.0 MMHG    pO2 (POC) 56 (L) 80 - 100 MMHG    HCO3 (POC) 22.4 22 - 26 MMOL/L    sO2 (POC) 88 (L) 92 - 97 %    Base deficit (POC) 3 mmol/L    Allens test (POC) YES      Total resp.  rate 18      Site RIGHT RADIAL      Specimen type (POC) ARTERIAL      Performed by Amol Jhaveri    CULTURE, BLOOD    Collection Time: 01/28/17  6:30 PM   Result Value Ref Range    Special Requests: NO SPECIAL REQUESTS      Culture result: NO GROWTH AFTER 10 HOURS     CULTURE, URINE    Collection Time: 01/28/17  7:00 PM   Result Value Ref Range    Special Requests: NO SPECIAL REQUESTS      Culture result: NO GROWTH AFTER 14 HOURS     CBC WITH AUTOMATED DIFF    Collection Time: 01/29/17  4:10 AM   Result Value Ref Range    WBC 19.7 (H) 4.6 - 13.2 K/uL    RBC 4.77 4.20 - 5.30 M/uL    HGB 13.3 12.0 - 16.0 g/dL    HCT 40.9 35.0 - 45.0 %    MCV 85.7 74.0 - 97.0 FL    MCH 27.9 24.0 - 34.0 PG    MCHC 32.5 31.0 - 37.0 g/dL    RDW 18.5 (H) 11.6 - 14.5 %    PLATELET 737 124 - 352 K/uL    MPV 9.9 9.2 - 11.8 FL    NEUTROPHILS 87 (H) 42 - 75 %    BANDS 1 0 - 5 %    LYMPHOCYTES 5 (L) 20 - 51 %    MONOCYTES 7 2 - 9 %    EOSINOPHILS 0 0 - 5 %    BASOPHILS 0 0 - 3 %    ABS. NEUTROPHILS 17.3 (H) 1.8 - 8.0 K/UL    ABS. LYMPHOCYTES 1.0 0.8 - 3.5 K/UL    ABS. MONOCYTES 1.4 (H) 0 - 1.0 K/UL    ABS. EOSINOPHILS 0.0 0.0 - 0.4 K/UL    ABS.  BASOPHILS 0.0 0.0 - 0.06 K/UL    DF MANUAL      PLATELET COMMENTS ADEQUATE PLATELETS      RBC COMMENTS ANISOCYTOSIS  1+        RBC COMMENTS POLYCHROMASIA  1+        RBC COMMENTS TARGET CELLS  1+       METABOLIC PANEL, BASIC    Collection Time: 01/29/17  4:10 AM   Result Value Ref Range    Sodium 133 (L) 136 - 145 mmol/L    Potassium 5.8 (H) 3.5 - 5.5 mmol/L    Chloride 95 (L) 100 - 108 mmol/L    CO2 26 21 - 32 mmol/L    Anion gap 12 3.0 - 18 mmol/L    Glucose 109 (H) 74 - 99 mg/dL    BUN 47 (H) 7.0 - 18 MG/DL    Creatinine 1.96 (H) 0.6 - 1.3 MG/DL    BUN/Creatinine ratio 24 (H) 12 - 20      GFR est AA 30 (L) >60 ml/min/1.73m2    GFR est non-AA 24 (L) >60 ml/min/1.73m2    Calcium 8.3 (L) 8.5 - 10.1 MG/DL   LACTIC ACID, PLASMA    Collection Time: 01/29/17  4:10 AM   Result Value Ref Range    Lactic acid 4.7 (HH) 0.4 - 2.0 MMOL/L   URINALYSIS W/ RFLX MICROSCOPIC    Collection Time: 01/29/17  5:45 AM   Result Value Ref Range    Color DARK YELLOW      Appearance TURBID      Specific gravity 1.021 1.005 - 1.030      pH (UA) 5.0 5.0 - 8.0 Protein 100 (A) NEG mg/dL    Glucose NEGATIVE  NEG mg/dL    Ketone NEGATIVE  NEG mg/dL    Bilirubin MODERATE (A) NEG      Blood LARGE (A) NEG      Urobilinogen 1.0 0.2 - 1.0 EU/dL    Nitrites NEGATIVE  NEG      Leukocyte Esterase MODERATE (A) NEG     URINE MICROSCOPIC ONLY    Collection Time: 01/29/17  5:45 AM   Result Value Ref Range    WBC 21 to 35 0 - 4 /hpf    RBC TOO NUMEROUS TO COUNT 0 - 5 /hpf    Epithelial cells 4+ 0 - 5 /lpf    Bacteria 4+ (A) NEG /hpf    Amorphous Crystals 2+ (A) NEG    Granular Cast 0 to 3 NEG /lpf   GLUCOSE, POC    Collection Time: 01/29/17  8:54 AM   Result Value Ref Range    Glucose (POC) 59 (L) 70 - 110 mg/dL   CBC WITH AUTOMATED DIFF    Collection Time: 01/29/17  9:00 AM   Result Value Ref Range    WBC 21.3 (H) 4.6 - 13.2 K/uL    RBC 5.02 4.20 - 5.30 M/uL    HGB 14.1 12.0 - 16.0 g/dL    HCT 43.5 35.0 - 45.0 %    MCV 86.7 74.0 - 97.0 FL    MCH 28.1 24.0 - 34.0 PG    MCHC 32.4 31.0 - 37.0 g/dL    RDW 18.5 (H) 11.6 - 14.5 %    PLATELET 595 820 - 222 K/uL    MPV 10.0 9.2 - 11.8 FL    NEUTROPHILS 87 (H) 42 - 75 %    BANDS 2 0 - 5 %    LYMPHOCYTES 4 (L) 20 - 51 %    MONOCYTES 7 2 - 9 %    EOSINOPHILS 0 0 - 5 %    BASOPHILS 0 0 - 3 %    ABS. NEUTROPHILS 18.5 (H) 1.8 - 8.0 K/UL    ABS. LYMPHOCYTES 0.9 0.8 - 3.5 K/UL    ABS. MONOCYTES 1.5 (H) 0 - 1.0 K/UL    ABS. EOSINOPHILS 0.0 0.0 - 0.4 K/UL    ABS.  BASOPHILS 0.0 0.0 - 0.1 K/UL    PLATELET COMMENTS LARGE PLATELETS      RBC COMMENTS ANISOCYTOSIS  1+        RBC COMMENTS POIKILOCYTOSIS  1+        RBC COMMENTS POLYCHROMASIA  1+        RBC COMMENTS TARGET CELLS  1+        DF MANUAL     METABOLIC PANEL, COMPREHENSIVE    Collection Time: 01/29/17  9:00 AM   Result Value Ref Range    Sodium 134 (L) 136 - 145 mmol/L    Potassium 6.0 (H) 3.5 - 5.5 mmol/L    Chloride 93 (L) 100 - 108 mmol/L    CO2 22 21 - 32 mmol/L    Anion gap 19 (H) 3.0 - 18 mmol/L    Glucose 67 (L) 74 - 99 mg/dL    BUN 50 (H) 7.0 - 18 MG/DL    Creatinine 2.23 (H) 0.6 - 1.3 MG/DL BUN/Creatinine ratio 22 (H) 12 - 20      GFR est AA 25 (L) >60 ml/min/1.73m2    GFR est non-AA 21 (L) >60 ml/min/1.73m2    Calcium 8.2 (L) 8.5 - 10.1 MG/DL    Bilirubin, total 1.3 (H) 0.2 - 1.0 MG/DL    ALT 28 13 - 56 U/L    AST 36 15 - 37 U/L    Alk. phosphatase 54 45 - 117 U/L    Protein, total 5.5 (L) 6.4 - 8.2 g/dL    Albumin 2.3 (L) 3.4 - 5.0 g/dL    Globulin 3.2 2.0 - 4.0 g/dL    A-G Ratio 0.7 (L) 0.8 - 1.7     CARDIAC PANEL,(CK, CKMB & TROPONIN)    Collection Time: 01/29/17  9:00 AM   Result Value Ref Range    CK 40 26 - 192 U/L    CK - MB 5.4 (H) 0.5 - 3.6 ng/ml    CK-MB Index 13.5 (H) 0.0 - 4.0 %    Troponin-I, Qt. 0.09 (H) 0.0 - 0.045 NG/ML   LACTIC ACID, PLASMA    Collection Time: 01/29/17  9:00 AM   Result Value Ref Range    Lactic acid 7.9 (HH) 0.4 - 2.0 MMOL/L   POC G3    Collection Time: 01/29/17  9:03 AM   Result Value Ref Range    Device: High Flow Nasal Cannula      Flow rate (POC) 7 L/M    FIO2 (POC) 0.48 %    pH (POC) 7.302 (L) 7.35 - 7.45      pCO2 (POC) 35.6 35.0 - 45.0 MMHG    pO2 (POC) 58 (L) 80 - 100 MMHG    HCO3 (POC) 17.6 (L) 22 - 26 MMOL/L    sO2 (POC) 87 (L) 92 - 97 %    Base deficit (POC) 9 mmol/L    Allens test (POC) YES      Total resp.  rate 18      Site RIGHT RADIAL      Specimen type (POC) ARTERIAL      Performed by Neal Boothe    EKG, 12 LEAD, SUBSEQUENT    Collection Time: 01/29/17  9:11 AM   Result Value Ref Range    Ventricular Rate 102 BPM    Atrial Rate 102 BPM    P-R Interval 164 ms    QRS Duration 82 ms    Q-T Interval 336 ms    QTC Calculation (Bezet) 437 ms    Calculated P Axis 78 degrees    Calculated R Axis 115 degrees    Calculated T Axis 23 degrees    Diagnosis       Sinus tachycardia  Right axis deviation  Possible Right ventricular hypertrophy  Septal infarct , age undetermined  Abnormal ECG    Confirmed by Charlie Montejo MD, Demarcus Eagle ([id number]) on 1/29/2017 10:36:02 AM     GLUCOSE, POC    Collection Time: 01/29/17  9:14 AM   Result Value Ref Range    Glucose (POC) 176 (H) 70 - 110 mg/dL   GLUCOSE, POC    Collection Time: 01/29/17 11:35 AM   Result Value Ref Range    Glucose (POC) 150 (H) 70 - 110 mg/dL   POC G3    Collection Time: 01/29/17 11:40 AM   Result Value Ref Range    Device: BIPAP      FIO2 (POC) 0.60 %    pH (POC) 7.313 (L) 7.35 - 7.45      pCO2 (POC) 38.8 35.0 - 45.0 MMHG    pO2 (POC) 73 (L) 80 - 100 MMHG    HCO3 (POC) 19.7 (L) 22 - 26 MMOL/L    sO2 (POC) 93 92 - 97 %    Base deficit (POC) 7 mmol/L    PEEP/CPAP (POC) 5 cmH2O    PIP (POC) 10      Allens test (POC) YES      Total resp.  rate 20      Site LEFT BRACHIAL      Specimen type (POC) ARTERIAL      Performed by Areli Valdez    PRO-BNP    Collection Time: 01/29/17  1:05 PM   Result Value Ref Range    NT pro-BNP 02468 (H) 0 - 1239 PG/ML   METABOLIC PANEL, BASIC    Collection Time: 01/29/17  1:05 PM   Result Value Ref Range    Sodium 136 136 - 145 mmol/L    Potassium 6.0 (H) 3.5 - 5.5 mmol/L    Chloride 95 (L) 100 - 108 mmol/L    CO2 28 21 - 32 mmol/L    Anion gap 13 3.0 - 18 mmol/L    Glucose 145 (H) 74 - 99 mg/dL    BUN 51 (H) 7.0 - 18 MG/DL    Creatinine 2.09 (H) 0.6 - 1.3 MG/DL    BUN/Creatinine ratio 24 (H) 12 - 20      GFR est AA 27 (L) >60 ml/min/1.73m2    GFR est non-AA 23 (L) >60 ml/min/1.73m2    Calcium 7.7 (L) 8.5 - 10.1 MG/DL   LACTIC ACID, PLASMA    Collection Time: 01/29/17  1:05 PM   Result Value Ref Range    Lactic acid 4.5 (HH) 0.4 - 2.0 MMOL/L     Additional Data Reviewed:      Current Facility-Administered Medications   Medication Dose Route Frequency    VANCOMYCIN INFORMATION NOTE   Other CONTINUOUS    dextrose (D50W) 50% injection syrg        [START ON 1/31/2017] Vancomycin random level due 1/31/17 at 0400  1 Each Other ONCE    sodium bicarbonate 8.4 % (1 mEq/mL) injection 50 mEq  50 mEq IntraVENous TID    sodium polystyrene (KAYEXALATE) 15 gram/60 mL oral suspension 30 g  30 g Oral NOW    aspirin chewable tablet 81 mg  81 mg Oral DAILY    ALPRAZolam (XANAX) tablet 0.125 mg  0.125 mg Oral BID PRN  doxepin (SINEquan) capsule 10 mg  10 mg Oral QPM    ergocalciferol (ERGOCALCIFEROL) capsule 50,000 Units  50,000 Units Oral Q7D    estradiol (ESTRACE) tablet 1 mg  1 mg Oral DAILY    gabapentin (NEURONTIN) capsule 600 mg  600 mg Oral QHS    acetaminophen (TYLENOL) tablet 650 mg  650 mg Oral Q4H PRN    HYDROcodone-acetaminophen (NORCO) 5-325 mg per tablet 1 Tab  1 Tab Oral Q4H PRN    naloxone (NARCAN) injection 0.4 mg  0.4 mg IntraVENous PRN    ondansetron (ZOFRAN) injection 4 mg  4 mg IntraVENous Q4H PRN    enoxaparin (LOVENOX) injection 40 mg  40 mg SubCUTAneous Q24H    albuterol (PROVENTIL VENTOLIN) nebulizer solution 2.5 mg  2.5 mg Nebulization Q6H PRN        acute hypoxic respiratory failure  Jostin/hyperkalemia  Severe pulmonary hypertension  Ho scleroderma    Maintain on bipap, transfer to icu  Kayexalate  Amp d50, iv insulin, bicarb for hyper k  Called [facility]-spoke with pulmonary fellow Sameer-no icu bes  Awaiting call back from Hetal Nam [facility]  Repeat tte  Repeat labs  Cont abx's       Case discussed with:  []Patient  []Family  []Nursing  []Case Management  DVT Prophylaxis:  []Lovenox  []Hep SQ  []SCDs  []Coumadin   []On Heparin gtt    Signed By: Marybeth Ferrara MD

## 2017-01-29 NOTE — CONSULTS
[facility]  Pulmonary, Critical Care, and Sleep Medicine      Name: Sherri Taylor MRN: [id number]   : 11/15/1933 Hospital: [address] [place] [address]   Date: 2017          Critical Care Initial Patient Consult    Requesting MD:   Dr. Sanya Penaloza MD                                                Reason for CC Consult: To assist with management of patient with her pulmonary hypertension. IMPRESSION:   Active Problems:  Pure hypercholesterolemia ()     Essential hypertension, benign ()     Prolonged Q-T interval on ECG (2016)     Myalgia (2017)     Hypoxia (2017)     CHF (congestive heart failure) ([facility] [address] (2017)     SOB (shortness of breath) (2017)         RECOMMENDATIONS:   · Pt to transfer to ICU, awaiting attending to arrange for transfer to [facility]. Discussed with Dr. Mike Segovia and Dr. Bianca Jewell. Dr. Mike Segovia has seen pt today. We will continue with Dr. Philomena Donnelly of care:  · Continue bipap  · This is not septic shock : d/c abx  · Hypotension and renal insufficiency is due to severe pul htn causing right heart failure : syncopal episodes very poor prognostic sign  · D/w patient and family at bedside : recommend to transfer to [facility] ICU for swan steve catheter monitored iv flolan rx  · D/w , [facility] has no bed available, thus Dr. Tawana Shine is arranging for pt to be transferred to [facility]. · Repeat all labs with pro-bnp  · Repeat lactate   · ivp bicarb now and q 8 hrs for 24 hrs  · Very poor prognosis  · Transfer patient to [facility] care [place] ASAP       Subjective/History:      Patient is a 80 y.o. female She has a past medical history of Raynaud's  syndrome, chronic respiratory failure, hypertension. In December, she had a transthoracic echo, which showed that she had elevated pressures in her right ventricle, consistent with pulmonary hypertension EF at that time was 55% to 60% and grade 1 diastolic dysfunction.  Discussed with Dr. Mike Segovia and she has expressed that the patients symptoms are more consistant with right-sided heart failure due to pulmonary HTN and recommends that the patient be transferred to [facility] ICU for swan steve catheter monitored IV flolan Rx. Dr. Tawana Shine has discussed with [facility] [place]ology and they have no beds available. Per Dr. Tawana Shine, arrangments are being made for pt to be transferred to [facility]. Pt will transfer to our ICU until transport to [facility]. Rapid Response called today for decreased responsiveness while in room 364. Apparently 2 RR's were called on yesterday as well for the same issue. Past Medical History   Diagnosis Date    Acute bronchitis     Acute upper respiratory infections of unspecified site     Essential hypertension, benign     Fibular collateral ligament bursitis     Insomnia, unspecified     Myalgia and myositis, unspecified     Pure hypercholesterolemia     Raynaud's syndrome       Past Surgical History   Procedure Laterality Date    Hx hysterectomy      Hx appendectomy      Hx cholecystectomy      Hx bladder suspension      Hx other surgical Right      foot surgery      Prior to Admission medications    Medication Sig Start Date End Date Taking? Authorizing Provider   metOLazone (ZAROXOLYN) 2.5 mg tablet 1 tablet every other day (stop HCTZ) 1/12/17  Yes Zbigniew Galvan MD   ergocalciferol (VITAMIN D2) 50,000 unit capsule 1 capsule daily 12/27/16  Yes Zbigniew Galvan MD   doxepin (SINEQUAN) 10 mg capsule TAKE 1 TABLET BY MOUTH AT BEDTIME 1/21/17   Zbigniew Galvan MD   estradiol (ESTRACE) 1 mg tablet TAKE 1 TABLET BY MOUTH EVERY DAY 1/14/17   Zbigniew Galvan MD   umeclidinium-vilanterol [facility]) 62.5-25 mcg/actuation inhaler Take 1 Puff by inhalation daily. 1/13/17   Suresh Clement MD   predniSONE (DELTASONE) 5 mg tablet 3 tablets daily for one week.  2 1/2 tablets daily for one weekly, then 2 tablets daily 1/12/17   Zbigniew Galvan MD   umeclidinium-vilanterol [facility]) 62.5-25 mcg/actuation inhaler Take 1 Puff by inhalation daily. 1/11/17   Gale Hopper MD   inhalational spacing device Use with the inhaler 1/4/17   Monty Mejía MD   promethazine (PHENERGAN) 25 mg tablet Take 25 mg by mouth every eight (8) hours as needed for Nausea.     Historical Provider   PROAIR HFA 90 mcg/actuation inhaler INHALE 2 PUFFS BY MOUTH 3 TIMES A DAY AS NEEDED FOR CHEST CONGESTION 11/3/16   Monty Mejía MD   doxepin [facility]) 10 mg capsule 1 tablet at bedtime 10/19/16   Monty Mejía MD   ALPRAZolam Ifeoma Schuyler) 0.25 mg tablet 1/2 tablet twice per day as needed for stress 10/19/16   Monty Mejía MD   gabapentin (NEURONTIN) 300 mg capsule TAKE 2 CAPSULE BY MOUTH AT BEDTIME 3/29/16   Monty Mejía MD   NIFEdipine ER (PROCARDIA XL) 30 mg ER tablet TAKE 1 TABLET BY MOUTH DAILY 3/6/16   Monty Mejía MD     Current Facility-Administered Medications   Medication Dose Route Frequency    VANCOMYCIN INFORMATION NOTE   Other CONTINUOUS    dextrose (D50W) 50% injection syrg        [START ON 1/31/2017] Vancomycin random level due 1/31/17 at 0400  1 Each Other ONCE    sodium bicarbonate 8.4 % (1 mEq/mL) injection 50 mEq  50 mEq IntraVENous TID    sodium polystyrene (KAYEXALATE) 15 gram/60 mL oral suspension 30 g  30 g Oral NOW    [START ON 1/30/2017] enoxaparin (LOVENOX) injection 30 mg  30 mg SubCUTAneous Q24H    aspirin chewable tablet 81 mg  81 mg Oral DAILY    doxepin (SINEquan) capsule 10 mg  10 mg Oral QPM    ergocalciferol (ERGOCALCIFEROL) capsule 50,000 Units  50,000 Units Oral Q7D    estradiol (ESTRACE) tablet 1 mg  1 mg Oral DAILY    gabapentin (NEURONTIN) capsule 600 mg  600 mg Oral QHS     Allergies   Allergen Reactions    Zithromax [Azithromycin] Nausea Only    Sulfa (Sulfonamide Antibiotics) Itching    Penicillins Other (comments)     Yeast infection      Social History   Substance Use Topics    Smoking status: Former Smoker     Packs/day: 1.00     Years: 20.00     Quit date: 1992    Smokeless tobacco: Never Used    Alcohol use Yes      Comment: occas      Family History   Problem Relation Age of Onset    Heart Disease Mother     Heart Disease Father     Other Sister      CNS aneurysm        Review of Systems:   Reviewed 10 systems all negative with exception of SOB and fatigue. Objective:   Vital Signs:    Visit Vitals    /72    Pulse 96    Temp 98.6 °F (37 °C)    Resp 18    Wt 74.7 kg (164 lb 11.2 oz)    SpO2 95%    Breastfeeding No    BMI 28.27 kg/m2       O2 Device: BIPAP   O2 Flow Rate (L/min): 7 l/min   Temp (24hrs), Av.2 °F (36.2 °C), Min:96.2 °F (35.7 °C), Max:98.6 °F (37 °C)       Intake/Output:   Last shift:       07 -  1900  In: 125 [P.O.:125]  Out: 50 [Urine:50]  Last 3 shifts:  190 -  0700  In: 1370 [P.O.:960; I.V.:410]  Out: 1355 [Urine:1355]    Intake/Output Summary (Last 24 hours) at 17 1539  Last data filed at 17 0951   Gross per 24 hour   Intake              835 ml   Output              250 ml   Net              585 ml     Physical Exam:  General:  Alert, cooperative, no distress, appears stated age. Head:  Normocephalic, without obvious abnormality, atraumatic. Eyes:  Conjunctivae/corneas clear. PERRL, EOMs intact. Fundi benign. Ears:  Normal TMs and external ear canals both ears. Nose: Nares normal. Septum midline. Mucosa normal. No drainage or sinus tenderness. Throat: Lips, mucosa, and tongue normal. Teeth and gums normal.   Neck: Supple, symmetrical, trachea midline, no adenopathy, thyroid: no enlargement/tenderness/nodules, no carotid bruit and no JVD. Back:  Symmetric, no curvature. ROM normal. No CVA tenderness. Lungs:  Clear to auscultation bilaterally. Chest wall:  No tenderness or deformity. Heart:  Regular rate and rhythm, S1, S2 normal, no murmur, click, rub or gallop. Breast Exam:  No tenderness, masses, or nipple abnormality. Abdomen:  Soft, non-tender.  Bowel sounds normal. No masses, No organomegaly. Genitalia:  Normal female without lesion, discharge or tenderness. Rectal:  Normal tone, no masses or tenderness  Guaiac negative stool. Extremities: Extremities normal, atraumatic, no cyanosis, decreasing edema. Pulses: 2+ and symmetric all extremities. Skin: Skin color, texture, turgor normal. No rashes or lesions. Lymph nodes: Cervical, supraclavicular, and axillary nodes normal.   Neurologic: CNII-XII intact.  Normal strength, sensation and reflexes throughout.                                                                            Data:     Recent Results (from the past 24 hour(s))   GLUCOSE, POC    Collection Time: 01/28/17  4:02 PM   Result Value Ref Range    Glucose (POC) 120 (H) 70 - 110 mg/dL   EKG, 12 LEAD, SUBSEQUENT    Collection Time: 01/28/17  4:11 PM   Result Value Ref Range    Ventricular Rate 98 BPM    Atrial Rate 98 BPM    P-R Interval 162 ms    QRS Duration 82 ms    Q-T Interval 342 ms    QTC Calculation (Bezet) 436 ms    Calculated P Axis 28 degrees    Calculated R Axis 110 degrees    Calculated T Axis 72 degrees    Diagnosis       Normal sinus rhythm  Right axis deviation  Possible Right ventricular hypertrophy  Septal infarct (cited on or before 25-JAN-2017)  Abnormal ECG  Confirmed by Jacques Begum MD, Kamran Gardner ([id number]) on 1/29/2017 89:59:03 AM     METABOLIC PANEL, BASIC    Collection Time: 01/28/17  4:20 PM   Result Value Ref Range    Sodium 132 (L) 136 - 145 mmol/L    Potassium 4.4 3.5 - 5.5 mmol/L    Chloride 95 (L) 100 - 108 mmol/L    CO2 26 21 - 32 mmol/L    Anion gap 11 3.0 - 18 mmol/L    Glucose 112 (H) 74 - 99 mg/dL    BUN 43 (H) 7.0 - 18 MG/DL    Creatinine 1.47 (H) 0.6 - 1.3 MG/DL    BUN/Creatinine ratio 29 (H) 12 - 20      GFR est AA 41 (L) >60 ml/min/1.73m2    GFR est non-AA 34 (L) >60 ml/min/1.73m2    Calcium 8.0 (L) 8.5 - 10.1 MG/DL   CBC WITH AUTOMATED DIFF    Collection Time: 01/28/17  4:20 PM   Result Value Ref Range    WBC 17.7 (H) 4.6 - 13.2 K/uL    RBC 4.52 4.20 - 5.30 M/uL    HGB 12.7 12.0 - 16.0 g/dL    HCT 37.9 35.0 - 45.0 %    MCV 83.8 74.0 - 97.0 FL    MCH 28.1 24.0 - 34.0 PG    MCHC 33.5 31.0 - 37.0 g/dL    RDW 18.3 (H) 11.6 - 14.5 %    PLATELET 908 476 - 431 K/uL    MPV 9.8 9.2 - 11.8 FL    NEUTROPHILS 81 (H) 40 - 73 %    LYMPHOCYTES 8 (L) 21 - 52 %    MONOCYTES 10 3 - 10 %    EOSINOPHILS 1 0 - 5 %    BASOPHILS 0 0 - 2 %    ABS. NEUTROPHILS 14.4 (H) 1.8 - 8.0 K/UL    ABS. LYMPHOCYTES 1.4 0.9 - 3.6 K/UL    ABS. MONOCYTES 1.8 (H) 0.05 - 1.2 K/UL    ABS. EOSINOPHILS 0.1 0.0 - 0.4 K/UL    ABS. BASOPHILS 0.0 0.0 - 0.1 K/UL    DF AUTOMATED     GLUCOSE, POC    Collection Time: 01/28/17  5:45 PM   Result Value Ref Range    Glucose (POC) 110 70 - 110 mg/dL   LACTIC ACID, PLASMA    Collection Time: 01/28/17  6:10 PM   Result Value Ref Range    Lactic acid 3.7 (HH) 0.4 - 2.0 MMOL/L   CULTURE, BLOOD    Collection Time: 01/28/17  6:10 PM   Result Value Ref Range    Special Requests: NO SPECIAL REQUESTS      Culture result: NO GROWTH AFTER 10 HOURS     POC G3    Collection Time: 01/28/17  6:11 PM   Result Value Ref Range    Device: NASAL CANNULA      Flow rate (POC) 3 L/M    FIO2 (POC) 32 %    pH (POC) 7.365 7.35 - 7.45      pCO2 (POC) 39.3 35.0 - 45.0 MMHG    pO2 (POC) 56 (L) 80 - 100 MMHG    HCO3 (POC) 22.4 22 - 26 MMOL/L    sO2 (POC) 88 (L) 92 - 97 %    Base deficit (POC) 3 mmol/L    Allens test (POC) YES      Total resp.  rate 18      Site RIGHT RADIAL      Specimen type (POC) ARTERIAL      Performed by Amaris Garland, BLOOD    Collection Time: 01/28/17  6:30 PM   Result Value Ref Range    Special Requests: NO SPECIAL REQUESTS      Culture result: NO GROWTH AFTER 10 HOURS     CULTURE, URINE    Collection Time: 01/28/17  7:00 PM   Result Value Ref Range    Special Requests: NO SPECIAL REQUESTS      Culture result: NO GROWTH AFTER 14 HOURS     CBC WITH AUTOMATED DIFF    Collection Time: 01/29/17  4:10 AM   Result Value Ref Range    WBC 19.7 (H) 4.6 - 13.2 K/uL    RBC 4.77 4.20 - 5.30 M/uL    HGB 13.3 12.0 - 16.0 g/dL    HCT 40.9 35.0 - 45.0 %    MCV 85.7 74.0 - 97.0 FL    MCH 27.9 24.0 - 34.0 PG    MCHC 32.5 31.0 - 37.0 g/dL    RDW 18.5 (H) 11.6 - 14.5 %    PLATELET 350 099 - 112 K/uL    MPV 9.9 9.2 - 11.8 FL    NEUTROPHILS 87 (H) 42 - 75 %    BANDS 1 0 - 5 %    LYMPHOCYTES 5 (L) 20 - 51 %    MONOCYTES 7 2 - 9 %    EOSINOPHILS 0 0 - 5 %    BASOPHILS 0 0 - 3 %    ABS. NEUTROPHILS 17.3 (H) 1.8 - 8.0 K/UL    ABS. LYMPHOCYTES 1.0 0.8 - 3.5 K/UL    ABS. MONOCYTES 1.4 (H) 0 - 1.0 K/UL    ABS. EOSINOPHILS 0.0 0.0 - 0.4 K/UL    ABS.  BASOPHILS 0.0 0.0 - 0.06 K/UL    DF MANUAL      PLATELET COMMENTS ADEQUATE PLATELETS      RBC COMMENTS ANISOCYTOSIS  1+        RBC COMMENTS POLYCHROMASIA  1+        RBC COMMENTS TARGET CELLS  1+       METABOLIC PANEL, BASIC    Collection Time: 01/29/17  4:10 AM   Result Value Ref Range    Sodium 133 (L) 136 - 145 mmol/L    Potassium 5.8 (H) 3.5 - 5.5 mmol/L    Chloride 95 (L) 100 - 108 mmol/L    CO2 26 21 - 32 mmol/L    Anion gap 12 3.0 - 18 mmol/L    Glucose 109 (H) 74 - 99 mg/dL    BUN 47 (H) 7.0 - 18 MG/DL    Creatinine 1.96 (H) 0.6 - 1.3 MG/DL    BUN/Creatinine ratio 24 (H) 12 - 20      GFR est AA 30 (L) >60 ml/min/1.73m2    GFR est non-AA 24 (L) >60 ml/min/1.73m2    Calcium 8.3 (L) 8.5 - 10.1 MG/DL   LACTIC ACID, PLASMA    Collection Time: 01/29/17  4:10 AM   Result Value Ref Range    Lactic acid 4.7 (HH) 0.4 - 2.0 MMOL/L   URINALYSIS W/ RFLX MICROSCOPIC    Collection Time: 01/29/17  5:45 AM   Result Value Ref Range    Color DARK YELLOW      Appearance TURBID      Specific gravity 1.021 1.005 - 1.030      pH (UA) 5.0 5.0 - 8.0      Protein 100 (A) NEG mg/dL    Glucose NEGATIVE  NEG mg/dL    Ketone NEGATIVE  NEG mg/dL    Bilirubin MODERATE (A) NEG      Blood LARGE (A) NEG      Urobilinogen 1.0 0.2 - 1.0 EU/dL    Nitrites NEGATIVE  NEG      Leukocyte Esterase MODERATE (A) NEG     URINE MICROSCOPIC ONLY    Collection Time: 01/29/17 5:45 AM   Result Value Ref Range    WBC 21 to 35 0 - 4 /hpf    RBC TOO NUMEROUS TO COUNT 0 - 5 /hpf    Epithelial cells 4+ 0 - 5 /lpf    Bacteria 4+ (A) NEG /hpf    Amorphous Crystals 2+ (A) NEG    Granular Cast 0 to 3 NEG /lpf   GLUCOSE, POC    Collection Time: 01/29/17  8:54 AM   Result Value Ref Range    Glucose (POC) 59 (L) 70 - 110 mg/dL   CBC WITH AUTOMATED DIFF    Collection Time: 01/29/17  9:00 AM   Result Value Ref Range    WBC 21.3 (H) 4.6 - 13.2 K/uL    RBC 5.02 4.20 - 5.30 M/uL    HGB 14.1 12.0 - 16.0 g/dL    HCT 43.5 35.0 - 45.0 %    MCV 86.7 74.0 - 97.0 FL    MCH 28.1 24.0 - 34.0 PG    MCHC 32.4 31.0 - 37.0 g/dL    RDW 18.5 (H) 11.6 - 14.5 %    PLATELET 025 553 - 006 K/uL    MPV 10.0 9.2 - 11.8 FL    NEUTROPHILS 87 (H) 42 - 75 %    BANDS 2 0 - 5 %    LYMPHOCYTES 4 (L) 20 - 51 %    MONOCYTES 7 2 - 9 %    EOSINOPHILS 0 0 - 5 %    BASOPHILS 0 0 - 3 %    ABS. NEUTROPHILS 18.5 (H) 1.8 - 8.0 K/UL    ABS. LYMPHOCYTES 0.9 0.8 - 3.5 K/UL    ABS. MONOCYTES 1.5 (H) 0 - 1.0 K/UL    ABS. EOSINOPHILS 0.0 0.0 - 0.4 K/UL    ABS. BASOPHILS 0.0 0.0 - 0.1 K/UL    PLATELET COMMENTS LARGE PLATELETS      RBC COMMENTS ANISOCYTOSIS  1+        RBC COMMENTS POIKILOCYTOSIS  1+        RBC COMMENTS POLYCHROMASIA  1+        RBC COMMENTS TARGET CELLS  1+        DF MANUAL     METABOLIC PANEL, COMPREHENSIVE    Collection Time: 01/29/17  9:00 AM   Result Value Ref Range    Sodium 134 (L) 136 - 145 mmol/L    Potassium 6.0 (H) 3.5 - 5.5 mmol/L    Chloride 93 (L) 100 - 108 mmol/L    CO2 22 21 - 32 mmol/L    Anion gap 19 (H) 3.0 - 18 mmol/L    Glucose 67 (L) 74 - 99 mg/dL    BUN 50 (H) 7.0 - 18 MG/DL    Creatinine 2.23 (H) 0.6 - 1.3 MG/DL    BUN/Creatinine ratio 22 (H) 12 - 20      GFR est AA 25 (L) >60 ml/min/1.73m2    GFR est non-AA 21 (L) >60 ml/min/1.73m2    Calcium 8.2 (L) 8.5 - 10.1 MG/DL    Bilirubin, total 1.3 (H) 0.2 - 1.0 MG/DL    ALT 28 13 - 56 U/L    AST 36 15 - 37 U/L    Alk.  phosphatase 54 45 - 117 U/L    Protein, total 5.5 (L) 6.4 - 8.2 g/dL    Albumin 2.3 (L) 3.4 - 5.0 g/dL    Globulin 3.2 2.0 - 4.0 g/dL    A-G Ratio 0.7 (L) 0.8 - 1.7     CARDIAC PANEL,(CK, CKMB & TROPONIN)    Collection Time: 01/29/17  9:00 AM   Result Value Ref Range    CK 40 26 - 192 U/L    CK - MB 5.4 (H) 0.5 - 3.6 ng/ml    CK-MB Index 13.5 (H) 0.0 - 4.0 %    Troponin-I, Qt. 0.09 (H) 0.0 - 0.045 NG/ML   LACTIC ACID, PLASMA    Collection Time: 01/29/17  9:00 AM   Result Value Ref Range    Lactic acid 7.9 (HH) 0.4 - 2.0 MMOL/L   POC G3    Collection Time: 01/29/17  9:03 AM   Result Value Ref Range    Device: High Flow Nasal Cannula      Flow rate (POC) 7 L/M    FIO2 (POC) 0.48 %    pH (POC) 7.302 (L) 7.35 - 7.45      pCO2 (POC) 35.6 35.0 - 45.0 MMHG    pO2 (POC) 58 (L) 80 - 100 MMHG    HCO3 (POC) 17.6 (L) 22 - 26 MMOL/L    sO2 (POC) 87 (L) 92 - 97 %    Base deficit (POC) 9 mmol/L    Allens test (POC) YES      Total resp.  rate 18      Site RIGHT RADIAL      Specimen type (POC) ARTERIAL      Performed by Marcela Bojorquez    EKG, 12 LEAD, SUBSEQUENT    Collection Time: 01/29/17  9:11 AM   Result Value Ref Range    Ventricular Rate 102 BPM    Atrial Rate 102 BPM    P-R Interval 164 ms    QRS Duration 82 ms    Q-T Interval 336 ms    QTC Calculation (Bezet) 437 ms    Calculated P Axis 78 degrees    Calculated R Axis 115 degrees    Calculated T Axis 23 degrees    Diagnosis       Sinus tachycardia  Right axis deviation  Possible Right ventricular hypertrophy  Septal infarct , age undetermined  Abnormal ECG    Confirmed by Obed Villafuerte MD, Tony Garcia ([id number]) on 1/29/2017 10:36:02 AM     GLUCOSE, POC    Collection Time: 01/29/17  9:14 AM   Result Value Ref Range    Glucose (POC) 176 (H) 70 - 110 mg/dL   GLUCOSE, POC    Collection Time: 01/29/17 11:35 AM   Result Value Ref Range    Glucose (POC) 150 (H) 70 - 110 mg/dL   POC G3    Collection Time: 01/29/17 11:40 AM   Result Value Ref Range    Device: BIPAP      FIO2 (POC) 0.60 %    pH (POC) 7.313 (L) 7.35 - 7.45      pCO2 (POC) 38.8 35.0 - 45.0 MMHG    pO2 (POC) 73 (L) 80 - 100 MMHG    HCO3 (POC) 19.7 (L) 22 - 26 MMOL/L    sO2 (POC) 93 92 - 97 %    Base deficit (POC) 7 mmol/L    PEEP/CPAP (POC) 5 cmH2O    PIP (POC) 10      Allens test (POC) YES      Total resp. rate 20      Site LEFT BRACHIAL      Specimen type (POC) ARTERIAL      Performed by Nicholas Yeager    PRO-BNP    Collection Time: 01/29/17  1:05 PM   Result Value Ref Range    NT pro-BNP 36028 (H) 0 - 2352 PG/ML   METABOLIC PANEL, BASIC    Collection Time: 01/29/17  1:05 PM   Result Value Ref Range    Sodium 136 136 - 145 mmol/L    Potassium 6.0 (H) 3.5 - 5.5 mmol/L    Chloride 95 (L) 100 - 108 mmol/L    CO2 28 21 - 32 mmol/L    Anion gap 13 3.0 - 18 mmol/L    Glucose 145 (H) 74 - 99 mg/dL    BUN 51 (H) 7.0 - 18 MG/DL    Creatinine 2.09 (H) 0.6 - 1.3 MG/DL    BUN/Creatinine ratio 24 (H) 12 - 20      GFR est AA 27 (L) >60 ml/min/1.73m2    GFR est non-AA 23 (L) >60 ml/min/1.73m2    Calcium 7.7 (L) 8.5 - 10.1 MG/DL   LACTIC ACID, PLASMA    Collection Time: 01/29/17  1:05 PM   Result Value Ref Range    Lactic acid 4.5 (HH) 0.4 - 2.0 MMOL/L                 Imaging:  I have personally reviewed the patients radiographs and have reviewed the reports:  AP CHEST, PORTABLE     INDICATION: Shortness of breath     COMPARISON: Prior chest x-rays, most recent 1/25/2017     FINDINGS: EKG leads overlie the patient.     Table moderate to marked enlargement of the cardiac silhouette. Atherosclerosis  noted. The pulmonary vasculature is unremarkable. Haziness at the mid to lower  right lung with increasing gradient towards the right lung base, slightly  increased since prior exam. Persistent peripheral opacity at the left midlung  and blunting of the left costophrenic sulcus. No convincing evidence for  pneumothorax No acute osseous abnormalities are identified.     IMPRESSION  Impression:       1.  Stable to slightly increased size of moderate right pleural effusion, and  stable small left pleural effusion.  -Overlying atelectasis present but superimposed infiltrate/edema not excluded.     2. Stable appearance of peripheral opacity at the left midlung.     3. Stable moderate to marked enlargement of the cardiac silhouette.                         Total critical care time exclusive of procedures: 40 minutes  All Robin NP

## 2017-01-29 NOTE — ROUTINE PROCESS
Bedside and Verbal shift change report given to [address] [place] [address] (oncoming nurse) by Jordin Augustin RN (offgoing nurse). Report included the following information SBAR, Kardex and MAR.

## 2017-01-29 NOTE — ROUTINE PROCESS
0800 Pt. Awake sitting up in bed  Alert and oriented x 4 no change in assessment . Silvia Dianne arrived to see pt. Due to low bp 90/52 sinus rhythm on tele. 0900 Pt. C/o of dizziness more lethargic less responsive when accompanied with family and Cna. Rapid Response was called . 9001 Alex BROOKS arrived and ordered stat port chest x ray. Dr. Gayathri Lynne and Dr. Monse Martinez arrived ABG's ordered Accucheck 59 1 amp of D50 ordered. 0930 Pt. More alert plan to transfer to ICU or stepdown . Nsg supervisor stated will transfer to CVT when bed is available. 1100 Dr. Carolyn Pop Pulmonary arrived and orderered  For Normal Saline bolus 1000cc with Na bicarb IV push. 1130 Pt. Still awaiting for room in CVT when available. 1200 Pt. Tolerating on bipab well family members at bedside. 1400 Dr. Yaneth Aldana was notified and plan to transfer to ICU or [facility]. 1600 Dr. Yaneth Aldana arrived and wrote discharge orders to [facility] and signed certification for transfer . 1700 Med transport was called and arranged to  and transport to room 8205 . Report called to [facility]. 1800 Med transport arrived and transported pt by david.

## 2017-01-29 NOTE — PROGRESS NOTES
responded to Rapid Response for  ARUN Almaraz, who is a 80 y.o.,female,     The  provided the following Interventions:  Provided crisis spiritual care and support. Offered prayers on behalf for the patient. Chart reviewed. The following outcomes were achieved:  Support for the son who is at the bedside. Assessment:  There are no further spiritual or Sikhism issues which require intervention at this time. Plan:  Chaplains will continue to follow and will provide spiritual care as needed.     [address] [facility]   Board Certified [address] [facility]   [phone]

## 2017-01-29 NOTE — PROGRESS NOTES
Received in bed awake and alert. Family at bedside. In no acute distress at present. Pt told of 2 episodes where she became unresponsive ,cold and clammy. B/p in 9o`s now. Pt not diaphoretic, or clammy. Skin is dry. Will watch closely tonight.

## 2017-01-29 NOTE — PROGRESS NOTES
Pulmonology Progress Note    Subjective:   Had 2 RRT\"s for decreased mentation. Has been put on bipap  Lasix drip has been d/c'd as patient was also hypotensive  And 750 ml ivf bolus has been given    Current Facility-Administered Medications   Medication Dose Route Frequency    VANCOMYCIN INFORMATION NOTE   Other CONTINUOUS    dextrose (D50W) 50% injection syrg        [START ON 1/31/2017] Vancomycin random level due 1/31/17 at 0400  1 Each Other ONCE    sodium bicarbonate 8.4 % (1 mEq/mL) injection 50 mEq  50 mEq IntraVENous ONCE    0.9% sodium chloride infusion 1,000 mL  1,000 mL IntraVENous ONCE    aspirin chewable tablet 81 mg  81 mg Oral DAILY    ALPRAZolam (XANAX) tablet 0.125 mg  0.125 mg Oral BID PRN    doxepin (SINEquan) capsule 10 mg  10 mg Oral QPM    ergocalciferol (ERGOCALCIFEROL) capsule 50,000 Units  50,000 Units Oral Q7D    estradiol (ESTRACE) tablet 1 mg  1 mg Oral DAILY    gabapentin (NEURONTIN) capsule 600 mg  600 mg Oral QHS    acetaminophen (TYLENOL) tablet 650 mg  650 mg Oral Q4H PRN    HYDROcodone-acetaminophen (NORCO) 5-325 mg per tablet 1 Tab  1 Tab Oral Q4H PRN    naloxone (NARCAN) injection 0.4 mg  0.4 mg IntraVENous PRN    ondansetron (ZOFRAN) injection 4 mg  4 mg IntraVENous Q4H PRN    enoxaparin (LOVENOX) injection 40 mg  40 mg SubCUTAneous Q24H    albuterol (PROVENTIL VENTOLIN) nebulizer solution 2.5 mg  2.5 mg Nebulization Q6H PRN       Review of Systems:  Pertinent items are noted in HPI.     Objective:     Visit Vitals    /63 (BP 1 Location: Right arm, BP Patient Position: At rest)    Pulse 98    Temp 98.6 °F (37 °C)    Resp 18    Wt 74.7 kg (164 lb 11.2 oz)    SpO2 95%    Breastfeeding No    BMI 28.27 kg/m2    O2 Flow Rate (L/min): 7 l/min O2 Device: BIPAP    01/27 1901 - 01/29 0700  In: 1370 [P.O.:960; I.V.:410]  Out: 7176 [Urine:1355]  01/29 0701 - 01/29 1900  In: 125 [P.O.:125]  Out: 50 [Urine:50]    Physical Exam:   Visit Vitals    /63 (BP 1 Location: Right arm, BP Patient Position: At rest)    Pulse 98    Temp 98.6 °F (37 °C)    Resp 18    Wt 74.7 kg (164 lb 11.2 oz)    SpO2 95%    Breastfeeding No    BMI 28.27 kg/m2     General:  Alert, cooperative, no distress, appears stated age. Head:  Normocephalic, without obvious abnormality, atraumatic. Eyes:  Conjunctivae/corneas clear. PERRL, EOMs intact. Fundi benign. Ears:  Normal TMs and external ear canals both ears. Nose: Nares normal. Septum midline. Mucosa normal. No drainage or sinus tenderness. Throat: Lips, mucosa, and tongue normal. Teeth and gums normal.   Neck: Supple, symmetrical, trachea midline, no adenopathy, thyroid: no enlargement/tenderness/nodules, no carotid bruit and no JVD. Back:   Symmetric, no curvature. ROM normal. No CVA tenderness. Lungs:   Clear to auscultation bilaterally. Chest wall:  No tenderness or deformity. Heart:  Regular rate and rhythm, S1, S2 normal, no murmur, click, rub or gallop. Breast Exam:  No tenderness, masses, or nipple abnormality. Abdomen:   Soft, non-tender. Bowel sounds normal. No masses,  No organomegaly. Genitalia:  Normal female without lesion, discharge or tenderness. Rectal:  Normal tone,  no masses or tenderness  Guaiac negative stool. Extremities: Extremities normal, atraumatic, no cyanosis, decreasing edema. Pulses: 2+ and symmetric all extremities. Skin: Skin color, texture, turgor normal. No rashes or lesions. Lymph nodes: Cervical, supraclavicular, and axillary nodes normal.   Neurologic: CNII-XII intact. Normal strength, sensation and reflexes throughout.        Data Review:    Recent Results (from the past 24 hour(s))   GLUCOSE, POC    Collection Time: 01/28/17  4:02 PM   Result Value Ref Range    Glucose (POC) 120 (H) 70 - 110 mg/dL   EKG, 12 LEAD, SUBSEQUENT    Collection Time: 01/28/17  4:11 PM   Result Value Ref Range    Ventricular Rate 98 BPM    Atrial Rate 98 BPM    P-R Interval 162 ms    QRS Duration 82 ms    Q-T Interval 342 ms    QTC Calculation (Bezet) 436 ms    Calculated P Axis 28 degrees    Calculated R Axis 110 degrees    Calculated T Axis 72 degrees    Diagnosis       Normal sinus rhythm  Right axis deviation  Possible Right ventricular hypertrophy  Septal infarct (cited on or before 25-JAN-2017)  Abnormal ECG  Confirmed by Luann Montana MD, Dion Retana ([id number]) on 1/29/2017 02:53:24 AM     METABOLIC PANEL, BASIC    Collection Time: 01/28/17  4:20 PM   Result Value Ref Range    Sodium 132 (L) 136 - 145 mmol/L    Potassium 4.4 3.5 - 5.5 mmol/L    Chloride 95 (L) 100 - 108 mmol/L    CO2 26 21 - 32 mmol/L    Anion gap 11 3.0 - 18 mmol/L    Glucose 112 (H) 74 - 99 mg/dL    BUN 43 (H) 7.0 - 18 MG/DL    Creatinine 1.47 (H) 0.6 - 1.3 MG/DL    BUN/Creatinine ratio 29 (H) 12 - 20      GFR est AA 41 (L) >60 ml/min/1.73m2    GFR est non-AA 34 (L) >60 ml/min/1.73m2    Calcium 8.0 (L) 8.5 - 10.1 MG/DL   CBC WITH AUTOMATED DIFF    Collection Time: 01/28/17  4:20 PM   Result Value Ref Range    WBC 17.7 (H) 4.6 - 13.2 K/uL    RBC 4.52 4.20 - 5.30 M/uL    HGB 12.7 12.0 - 16.0 g/dL    HCT 37.9 35.0 - 45.0 %    MCV 83.8 74.0 - 97.0 FL    MCH 28.1 24.0 - 34.0 PG    MCHC 33.5 31.0 - 37.0 g/dL    RDW 18.3 (H) 11.6 - 14.5 %    PLATELET 064 696 - 774 K/uL    MPV 9.8 9.2 - 11.8 FL    NEUTROPHILS 81 (H) 40 - 73 %    LYMPHOCYTES 8 (L) 21 - 52 %    MONOCYTES 10 3 - 10 %    EOSINOPHILS 1 0 - 5 %    BASOPHILS 0 0 - 2 %    ABS. NEUTROPHILS 14.4 (H) 1.8 - 8.0 K/UL    ABS. LYMPHOCYTES 1.4 0.9 - 3.6 K/UL    ABS. MONOCYTES 1.8 (H) 0.05 - 1.2 K/UL    ABS. EOSINOPHILS 0.1 0.0 - 0.4 K/UL    ABS.  BASOPHILS 0.0 0.0 - 0.1 K/UL    DF AUTOMATED     GLUCOSE, POC    Collection Time: 01/28/17  5:45 PM   Result Value Ref Range    Glucose (POC) 110 70 - 110 mg/dL   LACTIC ACID, PLASMA    Collection Time: 01/28/17  6:10 PM   Result Value Ref Range    Lactic acid 3.7 (HH) 0.4 - 2.0 MMOL/L   CULTURE, BLOOD    Collection Time: 01/28/17  6:10 PM   Result Value Ref Range    Special Requests: NO SPECIAL REQUESTS      Culture result: NO GROWTH AFTER 10 HOURS     POC G3    Collection Time: 01/28/17  6:11 PM   Result Value Ref Range    Device: NASAL CANNULA      Flow rate (POC) 3 L/M    FIO2 (POC) 32 %    pH (POC) 7.365 7.35 - 7.45      pCO2 (POC) 39.3 35.0 - 45.0 MMHG    pO2 (POC) 56 (L) 80 - 100 MMHG    HCO3 (POC) 22.4 22 - 26 MMOL/L    sO2 (POC) 88 (L) 92 - 97 %    Base deficit (POC) 3 mmol/L    Allens test (POC) YES      Total resp. rate 18      Site RIGHT RADIAL      Specimen type (POC) ARTERIAL      Performed by [address] [place] [address], BLOOD    Collection Time: 01/28/17  6:30 PM   Result Value Ref Range    Special Requests: NO SPECIAL REQUESTS      Culture result: NO GROWTH AFTER 10 HOURS     CBC WITH AUTOMATED DIFF    Collection Time: 01/29/17  4:10 AM   Result Value Ref Range    WBC 19.7 (H) 4.6 - 13.2 K/uL    RBC 4.77 4.20 - 5.30 M/uL    HGB 13.3 12.0 - 16.0 g/dL    HCT 40.9 35.0 - 45.0 %    MCV 85.7 74.0 - 97.0 FL    MCH 27.9 24.0 - 34.0 PG    MCHC 32.5 31.0 - 37.0 g/dL    RDW 18.5 (H) 11.6 - 14.5 %    PLATELET 614 617 - 450 K/uL    MPV 9.9 9.2 - 11.8 FL    NEUTROPHILS 87 (H) 42 - 75 %    BANDS 1 0 - 5 %    LYMPHOCYTES 5 (L) 20 - 51 %    MONOCYTES 7 2 - 9 %    EOSINOPHILS 0 0 - 5 %    BASOPHILS 0 0 - 3 %    ABS. NEUTROPHILS 17.3 (H) 1.8 - 8.0 K/UL    ABS. LYMPHOCYTES 1.0 0.8 - 3.5 K/UL    ABS. MONOCYTES 1.4 (H) 0 - 1.0 K/UL    ABS. EOSINOPHILS 0.0 0.0 - 0.4 K/UL    ABS.  BASOPHILS 0.0 0.0 - 0.06 K/UL    DF MANUAL      PLATELET COMMENTS ADEQUATE PLATELETS      RBC COMMENTS ANISOCYTOSIS  1+        RBC COMMENTS POLYCHROMASIA  1+        RBC COMMENTS TARGET CELLS  1+       METABOLIC PANEL, BASIC    Collection Time: 01/29/17  4:10 AM   Result Value Ref Range    Sodium 133 (L) 136 - 145 mmol/L    Potassium 5.8 (H) 3.5 - 5.5 mmol/L    Chloride 95 (L) 100 - 108 mmol/L    CO2 26 21 - 32 mmol/L    Anion gap 12 3.0 - 18 mmol/L    Glucose 109 (H) 74 - 99 mg/dL    BUN 47 (H) 7.0 - 18 MG/DL    Creatinine 1.96 (H) 0.6 - 1.3 MG/DL    BUN/Creatinine ratio 24 (H) 12 - 20      GFR est AA 30 (L) >60 ml/min/1.73m2    GFR est non-AA 24 (L) >60 ml/min/1.73m2    Calcium 8.3 (L) 8.5 - 10.1 MG/DL   LACTIC ACID, PLASMA    Collection Time: 01/29/17  4:10 AM   Result Value Ref Range    Lactic acid 4.7 (HH) 0.4 - 2.0 MMOL/L   URINALYSIS W/ RFLX MICROSCOPIC    Collection Time: 01/29/17  5:45 AM   Result Value Ref Range    Color DARK YELLOW      Appearance TURBID      Specific gravity 1.021 1.005 - 1.030      pH (UA) 5.0 5.0 - 8.0      Protein 100 (A) NEG mg/dL    Glucose NEGATIVE  NEG mg/dL    Ketone NEGATIVE  NEG mg/dL    Bilirubin MODERATE (A) NEG      Blood LARGE (A) NEG      Urobilinogen 1.0 0.2 - 1.0 EU/dL    Nitrites NEGATIVE  NEG      Leukocyte Esterase MODERATE (A) NEG     URINE MICROSCOPIC ONLY    Collection Time: 01/29/17  5:45 AM   Result Value Ref Range    WBC 21 to 35 0 - 4 /hpf    RBC TOO NUMEROUS TO COUNT 0 - 5 /hpf    Epithelial cells 4+ 0 - 5 /lpf    Bacteria 4+ (A) NEG /hpf    Amorphous Crystals 2+ (A) NEG    Granular Cast 0 to 3 NEG /lpf   GLUCOSE, POC    Collection Time: 01/29/17  8:54 AM   Result Value Ref Range    Glucose (POC) 59 (L) 70 - 110 mg/dL   CBC WITH AUTOMATED DIFF    Collection Time: 01/29/17  9:00 AM   Result Value Ref Range    WBC 21.3 (H) 4.6 - 13.2 K/uL    RBC 5.02 4.20 - 5.30 M/uL    HGB 14.1 12.0 - 16.0 g/dL    HCT 43.5 35.0 - 45.0 %    MCV 86.7 74.0 - 97.0 FL    MCH 28.1 24.0 - 34.0 PG    MCHC 32.4 31.0 - 37.0 g/dL    RDW 18.5 (H) 11.6 - 14.5 %    PLATELET 102 813 - 221 K/uL    MPV 10.0 9.2 - 11.8 FL    NEUTROPHILS 87 (H) 42 - 75 %    BANDS 2 0 - 5 %    LYMPHOCYTES 4 (L) 20 - 51 %    MONOCYTES 7 2 - 9 %    EOSINOPHILS 0 0 - 5 %    BASOPHILS 0 0 - 3 %    ABS. NEUTROPHILS 18.5 (H) 1.8 - 8.0 K/UL    ABS. LYMPHOCYTES 0.9 0.8 - 3.5 K/UL    ABS. MONOCYTES 1.5 (H) 0 - 1.0 K/UL    ABS. EOSINOPHILS 0.0 0.0 - 0.4 K/UL    ABS.  BASOPHILS 0.0 0.0 - 0.1 K/UL    PLATELET COMMENTS LARGE PLATELETS      RBC COMMENTS ANISOCYTOSIS  1+        RBC COMMENTS POIKILOCYTOSIS  1+        RBC COMMENTS POLYCHROMASIA  1+        RBC COMMENTS TARGET CELLS  1+        DF MANUAL     METABOLIC PANEL, COMPREHENSIVE    Collection Time: 01/29/17  9:00 AM   Result Value Ref Range    Sodium 134 (L) 136 - 145 mmol/L    Potassium 6.0 (H) 3.5 - 5.5 mmol/L    Chloride 93 (L) 100 - 108 mmol/L    CO2 22 21 - 32 mmol/L    Anion gap 19 (H) 3.0 - 18 mmol/L    Glucose 67 (L) 74 - 99 mg/dL    BUN 50 (H) 7.0 - 18 MG/DL    Creatinine 2.23 (H) 0.6 - 1.3 MG/DL    BUN/Creatinine ratio 22 (H) 12 - 20      GFR est AA 25 (L) >60 ml/min/1.73m2    GFR est non-AA 21 (L) >60 ml/min/1.73m2    Calcium 8.2 (L) 8.5 - 10.1 MG/DL    Bilirubin, total 1.3 (H) 0.2 - 1.0 MG/DL    ALT 28 13 - 56 U/L    AST 36 15 - 37 U/L    Alk. phosphatase 54 45 - 117 U/L    Protein, total 5.5 (L) 6.4 - 8.2 g/dL    Albumin 2.3 (L) 3.4 - 5.0 g/dL    Globulin 3.2 2.0 - 4.0 g/dL    A-G Ratio 0.7 (L) 0.8 - 1.7     CARDIAC PANEL,(CK, CKMB & TROPONIN)    Collection Time: 01/29/17  9:00 AM   Result Value Ref Range    CK 40 26 - 192 U/L    CK - MB 5.4 (H) 0.5 - 3.6 ng/ml    CK-MB Index 13.5 (H) 0.0 - 4.0 %    Troponin-I, Qt. 0.09 (H) 0.0 - 0.045 NG/ML   LACTIC ACID, PLASMA    Collection Time: 01/29/17  9:00 AM   Result Value Ref Range    Lactic acid 7.9 (HH) 0.4 - 2.0 MMOL/L   POC G3    Collection Time: 01/29/17  9:03 AM   Result Value Ref Range    Device: High Flow Nasal Cannula      Flow rate (POC) 7 L/M    FIO2 (POC) 0.48 %    pH (POC) 7.302 (L) 7.35 - 7.45      pCO2 (POC) 35.6 35.0 - 45.0 MMHG    pO2 (POC) 58 (L) 80 - 100 MMHG    HCO3 (POC) 17.6 (L) 22 - 26 MMOL/L    sO2 (POC) 87 (L) 92 - 97 %    Base deficit (POC) 9 mmol/L    Allens test (POC) YES      Total resp.  rate 18      Site RIGHT RADIAL      Specimen type (POC) ARTERIAL      Performed by Shant Solitario    EKG, 12 LEAD, SUBSEQUENT    Collection Time: 01/29/17  9:11 AM   Result Value Ref Range    Ventricular Rate 102 BPM Atrial Rate 102 BPM    P-R Interval 164 ms    QRS Duration 82 ms    Q-T Interval 336 ms    QTC Calculation (Bezet) 437 ms    Calculated P Axis 78 degrees    Calculated R Axis 115 degrees    Calculated T Axis 23 degrees    Diagnosis       Sinus tachycardia  Right axis deviation  Possible Right ventricular hypertrophy  Septal infarct , age undetermined  Abnormal ECG    Confirmed by Morgan Mccann MD, Tamie Rueda ([id number]) on 1/29/2017 10:36:02 AM     GLUCOSE, POC    Collection Time: 01/29/17  9:14 AM   Result Value Ref Range    Glucose (POC) 176 (H) 70 - 110 mg/dL   GLUCOSE, POC    Collection Time: 01/29/17 11:35 AM   Result Value Ref Range    Glucose (POC) 150 (H) 70 - 110 mg/dL   POC G3    Collection Time: 01/29/17 11:40 AM   Result Value Ref Range    Device: BIPAP      FIO2 (POC) 0.60 %    pH (POC) 7.313 (L) 7.35 - 7.45      pCO2 (POC) 38.8 35.0 - 45.0 MMHG    pO2 (POC) 73 (L) 80 - 100 MMHG    HCO3 (POC) 19.7 (L) 22 - 26 MMOL/L    sO2 (POC) 93 92 - 97 %    Base deficit (POC) 7 mmol/L    PEEP/CPAP (POC) 5 cmH2O    PIP (POC) 10      Allens test (POC) YES      Total resp.  rate 20      Site LEFT BRACHIAL      Specimen type (POC) ARTERIAL      Performed by Adelfa Kawasaki            Assessment:     Active Problems:    Pure hypercholesterolemia ()      Essential hypertension, benign ()      Prolonged Q-T interval on ECG (12/21/2016)      Myalgia (1/4/2017)      Hypoxia (1/12/2017)      CHF (congestive heart failure) ([address] (1/25/2017)      SOB (shortness of breath) (1/26/2017)      Events of last night and this am noted  Plan:     D/c po KCL  Continue bipap  This is not septic shock : d/c abx  Hypotension and renal insufficiency is due to severe pul htn causing right heart failure : syncopal episodes very poor prognostic sign  D/w patient and family at bedside : recommend to transfer to [facility] ICU for swan steve catheter monitored iv flolan rx  D/w Dr.CHo  Another liter of NS bolus  Repeat all labs with pro-bnp  Repeat lactate   ivp bicarb now and q 8 hrs for 24 hrs  Very poor prognosis  Family made aware  Transfer patient [facility]  More than 1 hour of critical care time spent  Will check on the patient again

## 2017-01-29 NOTE — PROGRESS NOTES
[facility]      CARDIOLOGY PROGRESS NOTE  RECS:    1. Acute on chronic diastolic heart failure- secondary to severe pulmonary HTN. continue slow diuresis and renal function has climbed. . Will monitor and watch output, symptoms. Continue with IV Lasix for now. oob and SCDs  2. Lactic acidosis- secondary to #1 and possible pneumonia. On abx. 3. Anasarca- thigh edema still present and Lasix stopped last night due to her syncopal episodes. 4. Pulmonary hypertension- severe and contributing to her issues this morning. Arvell Bouchra She will need a right heart catheterization to evaluate pulmonary vasodilator drugs when stable. 5 History of rheumatoid arthritis and Raynoud's. No episodes recently  6. Hx hypertension- has been having hypotension and manual BP performed by me is 90/60. During this mornings RRT it was same. 7. Hypokalemia- replaced with +K 40 meq once. Then 20 meq +K daily. 8. Diabetes- has had hypoglycemia this am, improving with D50 push. -Patient will need BiPAP and would benefit with transfer to MICU. She is receiving abx and WBC, lactate levels are rising.    -Her renal function is also on the rise, likely secondary to Lasix. Have stopped and will watch levels as well as her failure. ?nephrology consult   -Will hold on her R heart cath until her overall condition is improved. - d/w Dr Jennifer Ash in detail.  He will reconsult pulmonary also    D/w family in detail and explained poor prognosis       ASSESSMENT:  Hospital Problems  Date Reviewed: 1/23/2017          Codes Class Noted POA    SOB (shortness of breath) ICD-10-CM: R06.02  ICD-9-CM: 786.05  1/26/2017 Unknown        CHF (congestive heart failure) ([facility] ICD-10-CM: I50.9  ICD-9-CM: 428.0  1/25/2017 Unknown        Hypoxia ICD-10-CM: R09.02  ICD-9-CM: 799.02  1/12/2017 Yes        Myalgia ICD-10-CM: M79.1  ICD-9-CM: 729.1  1/4/2017 Yes        Prolonged Q-T interval on ECG ICD-10-CM: R94.31  ICD-9-CM: 794.31  12/21/2016 Yes Pure hypercholesterolemia ICD-10-CM: E78.00  ICD-9-CM: 272.0  Unknown Yes        Essential hypertension, benign ICD-10-CM: I10  ICD-9-CM: 401.1  Unknown Yes                SUBJECTIVE:  Patient states that she feels anxious and not doing well. Hospital activated 2 RRT's for apparent syncopal episodes. The patient states that she was wanting to get up yesterday but was feeling poorly. Lasix drip stopped and IVF's given. OBJECTIVE:    VS:   Visit Vitals    BP 93/66 (BP 1 Location: Right arm, BP Patient Position: At rest)    Pulse 97    Temp 96.5 °F (35.8 °C)    Resp 19    Wt 74.7 kg (164 lb 11.2 oz)    SpO2 95%    Breastfeeding No    BMI 28.27 kg/m2         Intake/Output Summary (Last 24 hours) at 01/29/17 0844  Last data filed at 01/28/17 2200   Gross per 24 hour   Intake             1190 ml   Output              355 ml   Net              835 ml     TELE: normal sinus rhythm    General: alert, cooperative, pale, in no apparent distress and stated age after  HENT: Normocephalic, atraumatic. Normal external eye.   Neck :  increased JVP, JVD difficult to assess due to obesity  Cardiac:  regular rate and rhythm, systolic murmur: early systolic 2/6, crescendo at 2nd right intercostal space  Chest/Lungs:decreased sounds bilaterally without wheeze, rales at bases  Extremities:  Hands and feet cool to touch without change in color, edema in upper thighs 2+, less on feet      Labs: Results:       Chemistry Recent Labs      01/29/17 0410 01/28/17   1620  01/28/17   0248  01/27/17   1330   GLU  109*  112*  110*   --    NA  133*  132*  133*   --    K  5.8*  4.4  4.2   --    CL  95*  95*  93*   --    CO2  26  26  29   --    BUN  47*  43*  41*   --    CREA  1.96*  1.47*  1.35*   --    CA  8.3*  8.0*  8.2*   --    MG   --    --    --   1.6*   AGAP  12  11  11   --    BUCR  24*  29*  30*   --       CBC w/Diff Recent Labs      01/29/17 0410 01/28/17   1620  01/28/17   0248   WBC  19.7*  17.7*  16.2*   RBC 4. 77  4.52  4.39   HGB  13.3  12.7  12.2   HCT  40.9  37.9  36.8   PLT  146  158  163   GRANS  87*  81*  86*   LYMPH  5*  8*  6*   EOS  0  1  0      Cardiac Enzymes No results for input(s): CPK, CKND1, RUODLPH in the last 72 hours. No lab exists for component: CKRMB, TROIP   Coagulation No results for input(s): PTP, INR, APTT in the last 72 hours. No lab exists for component: INREXT    Lipid Panel Lab Results   Component Value Date/Time    Cholesterol, total 133 01/27/2017 02:37 AM    HDL Cholesterol 34 01/27/2017 02:37 AM    LDL, calculated 83.8 01/27/2017 02:37 AM    VLDL, calculated 15.2 01/27/2017 02:37 AM    Triglyceride 76 01/27/2017 02:37 AM    CHOL/HDL Ratio 3.9 01/27/2017 02:37 AM      BNP No results for input(s): BNPP in the last 72 hours. Liver Enzymes No results for input(s): TP, ALB, TBIL, AP, SGOT, GPT in the last 72 hours. No lab exists for component: DBIL   Digoxin    Thyroid Studies Lab Results   Component Value Date/Time    TSH 0.74 12/21/2016 11:58 PM              KRISTYN Santos       Patient seen independently  Discussed the details with PA and patient.  Please see orders & recommendations  Lisa Nelson MD

## 2017-01-29 NOTE — PROGRESS NOTES
Lab called new lactic acid results of 4.7. Informed Dr Silviano Keys. Also expressed concerns about pt`s low urine output, b/p remaining in 90`s,. Pt denies chest pain ,sob, palpitations. Orders given for NS [id number], iv [facility] pharmacy to dose, and U/A, C&S.

## 2017-01-29 NOTE — PROGRESS NOTES
Rapid Response Note  [facility] Medicine    Patient: Nika Moreland 80 y.o. female  [id number]  11/15/1933      Admit Date: 2017   Admission Diagnosis: CHF (congestive heart failure) (HCC)  CHF (congestive heart failure) (HCC)    RAPID RESPONSE     Rapid response called for decreased responsiveness. RN states while checking on pt she was much less responsive than yesterday when she saw her. States 2 RRT's were called on pt yesterday because of same thing. Pt is lethargic in bed, extremely weak, is A&OX3, although very lethargic as compared to reports of how she is normally. She complains of shortness of breath, but is chronically short of breath. Is on O2 by Ernesto BAL at 10 LPM. Matt Sales, cardiology PA is at pt's bedside, relates history of severe pulmonary HTN and R heart failure, worsening kidney fxn, pleural effusions, and Raynaud's, pt is seen by Dr. Deborah Yusuf. Medications Reviewed    OBJECTIVE     Visit Vitals    /73 (BP 1 Location: Right arm, BP Patient Position: At rest)    Pulse (!) 102    Temp 97.1 orally     Resp 18    Wt 74.7 kg (164 lb 11.2 oz)    SpO2 98%    Breastfeeding No    BMI 28.27 kg/m2       PHYSICAL:  General:  A&OX3, lethargic, slow to arouse   CV:  RRR, no murmurs, rubs, or gallops. RESP:  Labored breathing, diminished sounds in bases bilaterally. No wheeze, rales, but scattered rhonchi on L. Equal expansion bilaterally. ABD:  Soft, nontender, nondistended. Hypoactive bowel sounds. No suprapubic tenderness. Neuro: 4/5 strength in all extremities. Sensation grossly intact. A+Ox3.   Extremities: All are very cold to touch, dp pulses are 1+, radial pulses are 2+     Medications administered: 1 amp D50    EKG: Sinus Tach at 102 bpm, no ectopy, no ST depression or elevation     Labs: CBC, CMP, LA, ABG and repeat, Cardiac panel ordered     AB.302/35.6/58/17.6    ASSESSMENT, PLAN & DISPOSITION   Rachel Felix is a 80y.o. year old female admitted for CHF (congestive heart failure) ([facility])  CHF (congestive heart failure) ([facility] Rapid response called for syncope/near syncope. Patient condition currently: Stable. Syncope/Near Syncope, Hypoglycemia   - Blood glucose of 56  - Given 1 Amp D50, glucose improved to 176, some improvement in pt's alertness and energy noted   - CBC, CMP, LA, ABG and repeat, and cardiac panel ordered   - CXR - bilateral pleural effusions, little change since CXR on 1/25, although R pleural effusion may be worse   - ABG 7.302/35.6/58/17.6  - on [place] NC at 6 lpm, turned up to 10 LPM   - Temp is 97.1 orally  - Transfer pt to stepdown   - Placed on Bi-PAP by respiratory therapy     Disposition: Stable, guarded     Attending Dr. Naty Gustafson notified of rapid response. In agreement with plan. Primary team resuming care.      DO DAYANARA KruegerJFK Johnson Rehabilitation Institute Medicine   January 29, 2017

## 2017-02-06 ENCOUNTER — PATIENT OUTREACH (OUTPATIENT)
Dept: INTERNAL MEDICINE CLINIC | Age: 82
End: 2017-02-06

## 2017-02-06 NOTE — PROGRESS NOTES
Transitions of Care Coordination    Sayra Felix was hospitalized at [facility] - for diastolic heart failure and hypxic respiratory failure. On 17 the patient was transferred to [facility] for further care. The patient  in the hospital on 17 after being placed on comfort care. Episode resolved.

## 2017-03-23 RX ORDER — GABAPENTIN 300 MG/1
CAPSULE ORAL
Qty: 180 CAP | Refills: 3 | Status: SHIPPED | OUTPATIENT
Start: 2017-03-23
